# Patient Record
Sex: FEMALE | Race: WHITE | Employment: FULL TIME | URBAN - METROPOLITAN AREA
[De-identification: names, ages, dates, MRNs, and addresses within clinical notes are randomized per-mention and may not be internally consistent; named-entity substitution may affect disease eponyms.]

---

## 2017-01-19 ENCOUNTER — OFFICE VISIT (OUTPATIENT)
Dept: FAMILY MEDICINE | Facility: CLINIC | Age: 37
End: 2017-01-19
Payer: COMMERCIAL

## 2017-01-19 VITALS
SYSTOLIC BLOOD PRESSURE: 118 MMHG | OXYGEN SATURATION: 100 % | WEIGHT: 173 LBS | BODY MASS INDEX: 27.8 KG/M2 | HEIGHT: 66 IN | TEMPERATURE: 98.7 F | HEART RATE: 76 BPM | DIASTOLIC BLOOD PRESSURE: 64 MMHG

## 2017-01-19 DIAGNOSIS — F33.0 MILD RECURRENT MAJOR DEPRESSION (H): ICD-10-CM

## 2017-01-19 DIAGNOSIS — M20.41 HAMMER TOES OF BOTH FEET: ICD-10-CM

## 2017-01-19 DIAGNOSIS — Z23 NEED FOR PROPHYLACTIC VACCINATION AND INOCULATION AGAINST INFLUENZA: ICD-10-CM

## 2017-01-19 DIAGNOSIS — M20.42 HAMMER TOES OF BOTH FEET: ICD-10-CM

## 2017-01-19 DIAGNOSIS — R10.13 ABDOMINAL PAIN, EPIGASTRIC: Primary | ICD-10-CM

## 2017-01-19 LAB
ALBUMIN SERPL-MCNC: 3.9 G/DL (ref 3.4–5)
ALP SERPL-CCNC: 46 U/L (ref 40–150)
ALT SERPL W P-5'-P-CCNC: 19 U/L (ref 0–50)
AMYLASE SERPL-CCNC: 24 U/L (ref 30–110)
AST SERPL W P-5'-P-CCNC: 14 U/L (ref 0–45)
BASOPHILS # BLD AUTO: 0 10E9/L (ref 0–0.2)
BASOPHILS NFR BLD AUTO: 0.2 %
BILIRUB DIRECT SERPL-MCNC: 0.1 MG/DL (ref 0–0.2)
BILIRUB SERPL-MCNC: 0.5 MG/DL (ref 0.2–1.3)
DIFFERENTIAL METHOD BLD: NORMAL
EOSINOPHIL # BLD AUTO: 0.2 10E9/L (ref 0–0.7)
EOSINOPHIL NFR BLD AUTO: 1.8 %
ERYTHROCYTE [DISTWIDTH] IN BLOOD BY AUTOMATED COUNT: 12.4 % (ref 10–15)
HCT VFR BLD AUTO: 38 % (ref 35–47)
HGB BLD-MCNC: 12.6 G/DL (ref 11.7–15.7)
LIPASE SERPL-CCNC: 86 U/L (ref 73–393)
LYMPHOCYTES # BLD AUTO: 2.6 10E9/L (ref 0.8–5.3)
LYMPHOCYTES NFR BLD AUTO: 29.1 %
MCH RBC QN AUTO: 31.9 PG (ref 26.5–33)
MCHC RBC AUTO-ENTMCNC: 33.2 G/DL (ref 31.5–36.5)
MCV RBC AUTO: 96 FL (ref 78–100)
MONOCYTES # BLD AUTO: 0.7 10E9/L (ref 0–1.3)
MONOCYTES NFR BLD AUTO: 8.1 %
NEUTROPHILS # BLD AUTO: 5.5 10E9/L (ref 1.6–8.3)
NEUTROPHILS NFR BLD AUTO: 60.8 %
PLATELET # BLD AUTO: 253 10E9/L (ref 150–450)
PROT SERPL-MCNC: 7.2 G/DL (ref 6.8–8.8)
RBC # BLD AUTO: 3.95 10E12/L (ref 3.8–5.2)
WBC # BLD AUTO: 9 10E9/L (ref 4–11)

## 2017-01-19 PROCEDURE — 90686 IIV4 VACC NO PRSV 0.5 ML IM: CPT | Performed by: FAMILY MEDICINE

## 2017-01-19 PROCEDURE — 82150 ASSAY OF AMYLASE: CPT | Performed by: FAMILY MEDICINE

## 2017-01-19 PROCEDURE — 80076 HEPATIC FUNCTION PANEL: CPT | Performed by: FAMILY MEDICINE

## 2017-01-19 PROCEDURE — 36415 COLL VENOUS BLD VENIPUNCTURE: CPT | Performed by: FAMILY MEDICINE

## 2017-01-19 PROCEDURE — 99214 OFFICE O/P EST MOD 30 MIN: CPT | Mod: 25 | Performed by: FAMILY MEDICINE

## 2017-01-19 PROCEDURE — 90471 IMMUNIZATION ADMIN: CPT | Performed by: FAMILY MEDICINE

## 2017-01-19 PROCEDURE — 85025 COMPLETE CBC W/AUTO DIFF WBC: CPT | Performed by: FAMILY MEDICINE

## 2017-01-19 PROCEDURE — 83690 ASSAY OF LIPASE: CPT | Performed by: FAMILY MEDICINE

## 2017-01-19 ASSESSMENT — ANXIETY QUESTIONNAIRES
3. WORRYING TOO MUCH ABOUT DIFFERENT THINGS: NEARLY EVERY DAY
1. FEELING NERVOUS, ANXIOUS, OR ON EDGE: NEARLY EVERY DAY
2. NOT BEING ABLE TO STOP OR CONTROL WORRYING: NEARLY EVERY DAY
GAD7 TOTAL SCORE: 19
IF YOU CHECKED OFF ANY PROBLEMS ON THIS QUESTIONNAIRE, HOW DIFFICULT HAVE THESE PROBLEMS MADE IT FOR YOU TO DO YOUR WORK, TAKE CARE OF THINGS AT HOME, OR GET ALONG WITH OTHER PEOPLE: VERY DIFFICULT
6. BECOMING EASILY ANNOYED OR IRRITABLE: NEARLY EVERY DAY
5. BEING SO RESTLESS THAT IT IS HARD TO SIT STILL: NEARLY EVERY DAY
7. FEELING AFRAID AS IF SOMETHING AWFUL MIGHT HAPPEN: SEVERAL DAYS

## 2017-01-19 ASSESSMENT — PATIENT HEALTH QUESTIONNAIRE - PHQ9: 5. POOR APPETITE OR OVEREATING: NEARLY EVERY DAY

## 2017-01-19 NOTE — NURSING NOTE
"Chief Complaint   Patient presents with     Abdominal Pain     Depression     Musculoskeletal Problem     Right foot painful      Musculoskeletal Problem     left shin has a bruise for over a year     Flu Shot       Initial /64 mmHg  Pulse 76  Temp(Src) 98.7  F (37.1  C)  Ht 5' 6\" (1.676 m)  Wt 173 lb (78.472 kg)  BMI 27.94 kg/m2  SpO2 100% Estimated body mass index is 27.94 kg/(m^2) as calculated from the following:    Height as of this encounter: 5' 6\" (1.676 m).    Weight as of this encounter: 173 lb (78.472 kg).  BP completed using cuff size: kathryn Pinedo MA      "

## 2017-01-19 NOTE — PROGRESS NOTES
"Chief Complaint   Patient presents with     Abdominal Pain     Depression     Musculoskeletal Problem     Right foot painful      Musculoskeletal Problem     left shin has a bruise for over a year     Flu Shot     SUBJECTIVE:  This 36 year old female is here today for a variety of reasons:  1. She has had chronic upper abdomen pains for the past 15 years. Recalls that she was a vegetarian until she got pregnant with her son 15 years ago. She craved meat while pregnant. She has had recurrent upper abdomen pains after eating since delivery of her son. She has since had another pregnancy. She avoids all greasy foods. She is to the point where she feels she can't eat much of anything without having abdomen pain. It is often associated with vomiting too. In fact, she has lost 80 pounds over the past few years due to her avoids eating so many foods. She wonders if she needs a colonoscopy. She was referred for an abdomen ultrasound 2 years ago, but she didn't get that done. 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 She cooks her own fresh foods daily.   2. She has to stand all day in a kitchen and she has developed a painful area on the bottom of her right foot. It thickens and she cuts it down. She wears a donut cushion around it but those cushions don't stay in place. She needs to have less pain in her feet so she can keep working.  3. She had some trauma twice to her left lower tibia area in the past year. She is surprised that it is still discolored. It doesn't hurt anymore, though.   4. Her depression is under good control and she needs a refill of her prozac.   All other review of systems are negative  Personal, family, and social history reviewed with patient and revised.  OBJECTIVE:  Vital signs:  Temp: 98.7  F (37.1  C)   BP: 118/64 mmHg Pulse: 76     SpO2: 100 %     Height: 5' 6\" (167.6 cm) Weight: 173 lb (78.472 kg)  Estimated body mass index is 27.94 kg/(m^2) as calculated from " "the following:    Height as of this encounter: 5' 6\" (1.676 m).    Weight as of this encounter: 173 lb (78.472 kg).  Well hydrated  Well nourished  Well groomed  Active talker  Good spirits  Abdomen: soft and non-tender. No masses. No hepatosplenomegaly. She has not eaten yet today so her pain is not there.  Moves well on exam table   Patient has hammertoes. Has callus over the plantar aspect of right foot over 3rd and 4th toes. I pared it down for her. She will try moleskin over this area  She has mild ecchymosis over her left lower tibia from previous trauma. Reassured her. This will slowly resolve.   Brisk gait with no limp  No jaundice   ASSESSMENT / PLAN:  (R10.13) Abdominal pain, epigastric  (primary encounter diagnosis)  Comment: as above, most likely due to gallstones   Plan: US Abdomen Complete, Hepatic panel, CBC with         platelets differential, Amylase, Lipase         If ultrasound is negative, will do HIDA scan     (F33.0) Mild recurrent major depression (H)  Comment:   PHQ-9 score:    PHQ-9 SCORE 1/19/2017   Total Score -   Total Score 14     Plan: FLUoxetine (PROZAC) 20 MG capsule        Refilled     (M20.41,  M20.42) Hammer toes of both feet  Comment: as above   Plan: PODIATRY/FOOT & ANKLE SURGERY REFERRAL             (Z23) Need for prophylactic vaccination and inoculation against influenza  Comment: due  Plan: FLU VAC, SPLIT VIRUS IM > 3 YO (QUADRIVALENT)         [61138], Vaccine Administration, Initial         [41357]             FLAQUITO VERDUGO M.D.                          Injectable Influenza Immunization Documentation    1.  Is the person to be vaccinated sick today?  No    2. Does the person to be vaccinated have an allergy to eggs or to a component of the vaccine?  No    3. Has the person to be vaccinated today ever had a serious reaction to influenza vaccine in the past?  No    4. Has the person to be vaccinated ever had Guillain-Jourdanton syndrome?  No     Form completed by Kathryn " Khris CASE

## 2017-01-19 NOTE — Clinical Note
[address] [place] [address]    January 20, 2017    Jena Pinedo  [address] [place] [address]          Dear Jena,    Your blood work all looks good. The ultrasound will give more information    Enclosed is a copy of your results.     Results for orders placed or performed in visit on 01/19/17   Hepatic panel   Result Value Ref Range    Bilirubin Direct 0.1 0.0 - 0.2 mg/dL    Bilirubin Total 0.5 0.2 - 1.3 mg/dL    Albumin 3.9 3.4 - 5.0 g/dL    Protein Total 7.2 6.8 - 8.8 g/dL    Alkaline Phosphatase 46 40 - 150 U/L    ALT 19 0 - 50 U/L    AST 14 0 - 45 U/L   CBC with platelets differential   Result Value Ref Range    WBC 9.0 4.0 - 11.0 10e9/L    RBC Count 3.95 3.8 - 5.2 10e12/L    Hemoglobin 12.6 11.7 - 15.7 g/dL    Hematocrit 38.0 35.0 - 47.0 %    MCV 96 78 - 100 fl    MCH 31.9 26.5 - 33.0 pg    MCHC 33.2 31.5 - 36.5 g/dL    RDW 12.4 10.0 - 15.0 %    Platelet Count 253 150 - 450 10e9/L    Diff Method Automated Method     % Neutrophils 60.8 %    % Lymphocytes 29.1 %    % Monocytes 8.1 %    % Eosinophils 1.8 %    % Basophils 0.2 %    Absolute Neutrophil 5.5 1.6 - 8.3 10e9/L    Absolute Lymphocytes 2.6 0.8 - 5.3 10e9/L    Absolute Monocytes 0.7 0.0 - 1.3 10e9/L    Absolute Eosinophils 0.2 0.0 - 0.7 10e9/L    Absolute Basophils 0.0 0.0 - 0.2 10e9/L   Amylase   Result Value Ref Range    Amylase 24 (L) 30 - 110 U/L   Lipase   Result Value Ref Range    Lipase 86 73 - 393 U/L       If you have any questions or concerns, please call myself or my nurse at [phone].      Sincerely,        Krysten Duncan MD/ELLEN

## 2017-01-19 NOTE — PATIENT INSTRUCTIONS
HOW TO QUIT SMOKING  Smoking is one of the hardest habits to break. About half of all those who have ever smoked have been able to quit, and most of those (about 70%) who still smoke want to quit. Here are some of the best ways to stop smoking.     KEEP TRYING:  It takes most smokers about 8 tries before they are finally able to fully quit. So, the more often you try and fail, the better your chance of quitting the next time! So, don't give up!    GO COLD TURKEY:  Most ex-smokers quit cold turkey. Trying to cut back gradually doesn't seem to work as well, perhaps because it continues the smoking habit. Also, it is possible to fool yourself by inhaling more while smoking fewer cigarettes. This results in the same amount of nicotine in your body!    GET SUPPORT:  Support programs can make an important difference, especially for the heavy smoker. These groups offer lectures, methods to change your behavior and peer support. Call the free national Quitline for more information. 800-QUIT-NOW (305-389-8538). Low-cost or free programs are offered by many hospitals, local chapters of the American Lung Association (404-594-7886) and the American Cancer Society (057-710-1319). Support at home is important too. Non-smokers can help by offering praise and encouragement. If the smoker fails to quit, encourage them to try again!    OVER-THE-COUNTER MEDICINES:  For those who can't quit on their own, Nicotine Replacement Therapy (NRT) may make quitting much easier. Certain aids such as the nicotine patch, gum and lozenge are available without a prescription. However, it is best to use these under the guidance of your doctor. The skin patch provides a steady supply of nicotine to the body. Nicotine gum and lozenge gives temporary bursts of low levels of nicotine. Both methods take the edge off the craving for cigarettes. WARNING: If you feel symptoms of nicotine overdose, such as nausea, vomiting, dizziness, weakness, or fast  heartbeat, stop using these and see your doctor.    PRESCRIPTION MEDICINES:  After evaluating your smoking patterns and prior attempts at quitting, your doctor may offer a prescription medicine such as bupropion (Zyban, Wellbutrin), varenicline (Chantix, Champix), a niocotine inhaler or nasal spray. Each has its unique advantage and side effects which your doctor can review with you.    HEALTH BENEFITS OF QUITTING:  The benefits of quitting start right away and keep improving the longer you go without smokin minutes: blood pressure and pulse return to normal  8 hours: oxygen levels return to normal  2 days: ability to smell and taste begins to improve as damaged nerves start to regrow  2-3 weeks: circulation and lung function improves  1-9 months: decreased cough, congestion and shortness of breath; less tired  1 year: risk of heart attack decreases by half  5 years: risk of lung cancer decreases by half; risk of stroke becomes the same as a non-smoker  For information about how to quit smoking, visit the following links:  [employer] [place] ,   Clearing the Air, Quit Smoking Today   - an online booklet. http://www.smokefree.gov/pubs/clearing_the_air.pdf  Smokefree.gov http://smokefree.gov/  QuitNet http://www.quitnet.com/    3610-6942 Ketan Cowan, [address] [place] [address], [place] [address]. All rights reserved. This information is not intended as a substitute for professional medical care. Always follow your healthcare professional's instructions.    The Benefits of Living Smoke Free  What do you want to gain from quitting? Check off some reasons to quit.  Health Benefits  ___ Reduce my risk of lung cancer, heart disease, chronic lung disease  ___ Have fewer wrinkles and softer skin  ___ Improve my sense of taste and smell  ___ For pregnant women--reduce the risk of having a miscarriage, stillbirth, premature birth, or low-birth-weight baby  Personal Benefits  ___ Feel more in control of my  life  ___ Have better-smelling hair, breath, clothes, home, and car  ___ Save time by not having to take smoke breaks, buy cigarettes, or hunt for a light  ___ Have whiter teeth  Family Benefits  ___ Reduce my children s respiratory tract infections  ___ Set a good example for my children  ___ Reduce my family s cancer risk  Financial Benefits  ___ Save hundreds of dollars each year that would be spent on cigarettes  ___ Save money on medical bills  ___ Save on life, health, and car insurance premiums    Those Dollars Add Up!  Cigarettes are expensive, and getting more expensive all the time. Do you realize how much money you are spending on cigarettes per year? What is the average amount you spend on a pack of cigarettes? What is the average number of packs that you smoke per day? Using your answers to these questions, fill in this formula to help you find out:  ($ _____ per pack) ×  ( _____ number of packs per day) × (365 days) =  $ _____ yearly cost of smoking  Besides tobacco, there are other costs, including extra cleaning bills and replacement costs for clothing and furniture; medical expenses for smoking-related illnesses; and higher health, life, and car insurance premiums.    Cigars and Pipes Count Too!  Cigars and pipes are also dangerous. So are smokeless (chewing) tobacco and snuff. All of these products contain nicotine, a highly addictive substance that has harmful effects on your body. Quitting smoking means giving up all tobacco products.      6804-7712 [address], [place] [address]. All rights reserved. This information is not intended as a substitute for professional medical care. Always follow your healthcare professional's instructions.    [facility]    If you have any questions regarding to your visit please contact your care team:       Team Purple:   Clinic Hours Telephone Number   Dr. Krysten Reyes,  [place]   7am-7pm  Monday - Thursday   7am-5pm  Fridays  [phone]  (Appointment scheduling available 24/7)    Questions about your Visit?   Team Line:  [phone]   Urgent Care - [place] and [place] - 11am-9pm Monday-Friday Saturday-Sunday- 9am-5pm   [place] - 5pm-9pm Monday-Friday Saturday-Sunday- 9am-5pm  [phone] - [place]   [phone] - [place]       What options do I have for visits at the clinic other than the traditional office visit?  To expand how we care for you, many of our providers are utilizing electronic visits (e-visits) and telephone visits, when medically appropriate, for interactions with their patients rather than a visit in the clinic.   We also offer nurse visits for many medical concerns. Just like any other service, we will bill your insurance company for this type of visit based on time spent on the phone with your provider. Not all insurance companies cover these visits. Please check with your medical insurance if this type of visit is covered. You will be responsible for any charges that are not paid by your insurance.      E-visits via [employer]:  generally incur a $35.00 fee.  Telephone visits:  Time spent on the phone: *charged based on time that is spent on the phone in increments of 10 minutes. Estimated cost:   5-10 mins $30.00   11-20 mins. $59.00   21-30 mins. $85.00     Use [employer] (secure email communication and access to your chart) to send your primary care provider a message or make an appointment. Ask someone on your Team how to sign up for [employer].  For a Price Quote for your services, please call our Consumer Price Line at [phone].  As always, Thank you for trusting us with your health care needs!

## 2017-01-19 NOTE — MR AVS SNAPSHOT
After Visit Summary   1/19/2017    Jena Pinedo    MRN: [id number]           Patient Information     Date Of Birth          1980        Visit Information        Provider Department      1/19/2017 10:30 AM Krysten Duncan MD [facility]        Today's Diagnoses     Abdominal pain, epigastric    -  1     Mild recurrent major depression (H)         Hammer toes of both feet         Need for prophylactic vaccination and inoculation against influenza           Care Instructions      HOW TO QUIT SMOKING  Smoking is one of the hardest habits to break. About half of all those who have ever smoked have been able to quit, and most of those (about 70%) who still smoke want to quit. Here are some of the best ways to stop smoking.     KEEP TRYING:  It takes most smokers about 8 tries before they are finally able to fully quit. So, the more often you try and fail, the better your chance of quitting the next time! So, don't give up!    GO COLD TURKEY:  Most ex-smokers quit cold turkey. Trying to cut back gradually doesn't seem to work as well, perhaps because it continues the smoking habit. Also, it is possible to fool yourself by inhaling more while smoking fewer cigarettes. This results in the same amount of nicotine in your body!    GET SUPPORT:  Support programs can make an important difference, especially for the heavy smoker. These groups offer lectures, methods to change your behavior and peer support. Call the free national Quitline for more information. 800-QUIT-NOW (563-888-9743). Low-cost or free programs are offered by many hospitals, local chapters of the American Lung Association (492-547-6508) and the American Cancer Society (565-559-7138). Support at home is important too. Non-smokers can help by offering praise and encouragement. If the smoker fails to quit, encourage them to try again!    OVER-THE-COUNTER MEDICINES:  For those who can't quit on their own, Nicotine  Replacement Therapy (NRT) may make quitting much easier. Certain aids such as the nicotine patch, gum and lozenge are available without a prescription. However, it is best to use these under the guidance of your doctor. The skin patch provides a steady supply of nicotine to the body. Nicotine gum and lozenge gives temporary bursts of low levels of nicotine. Both methods take the edge off the craving for cigarettes. WARNING: If you feel symptoms of nicotine overdose, such as nausea, vomiting, dizziness, weakness, or fast heartbeat, stop using these and see your doctor.    PRESCRIPTION MEDICINES:  After evaluating your smoking patterns and prior attempts at quitting, your doctor may offer a prescription medicine such as bupropion (Zyban, Wellbutrin), varenicline (Chantix, Champix), a niocotine inhaler or nasal spray. Each has its unique advantage and side effects which your doctor can review with you.    HEALTH BENEFITS OF QUITTING:  The benefits of quitting start right away and keep improving the longer you go without smokin minutes: blood pressure and pulse return to normal  8 hours: oxygen levels return to normal  2 days: ability to smell and taste begins to improve as damaged nerves start to regrow  2-3 weeks: circulation and lung function improves  1-9 months: decreased cough, congestion and shortness of breath; less tired  1 year: risk of heart attack decreases by half  5 years: risk of lung cancer decreases by half; risk of stroke becomes the same as a non-smoker  For information about how to quit smoking, visit the following links:  National Cancer [place] ,   Clearing the Air, Quit Smoking Today   - an online booklet. http://www.smokefree.gov/pubs/clearing_the_air.pdf  Smokefree.gov http://smokefree.gov/  QuitNet http://www.quitnet.com/    3528-6473 Ketan Cowan, [address] [place] [address], [place] [address]. All rights reserved. This information is not intended as a substitute for professional medical  care. Always follow your healthcare professional's instructions.    The Benefits of Living Smoke Free  What do you want to gain from quitting? Check off some reasons to quit.  Health Benefits  ___ Reduce my risk of lung cancer, heart disease, chronic lung disease  ___ Have fewer wrinkles and softer skin  ___ Improve my sense of taste and smell  ___ For pregnant women--reduce the risk of having a miscarriage, stillbirth, premature birth, or low-birth-weight baby  Personal Benefits  ___ Feel more in control of my life  ___ Have better-smelling hair, breath, clothes, home, and car  ___ Save time by not having to take smoke breaks, buy cigarettes, or hunt for a light  ___ Have whiter teeth  Family Benefits  ___ Reduce my children s respiratory tract infections  ___ Set a good example for my children  ___ Reduce my family s cancer risk  Financial Benefits  ___ Save hundreds of dollars each year that would be spent on cigarettes  ___ Save money on medical bills  ___ Save on life, health, and car insurance premiums    Those Dollars Add Up!  Cigarettes are expensive, and getting more expensive all the time. Do you realize how much money you are spending on cigarettes per year? What is the average amount you spend on a pack of cigarettes? What is the average number of packs that you smoke per day? Using your answers to these questions, fill in this formula to help you find out:  ($ _____ per pack) ×  ( _____ number of packs per day) × (365 days) =  $ _____ yearly cost of smoking  Besides tobacco, there are other costs, including extra cleaning bills and replacement costs for clothing and furniture; medical expenses for smoking-related illnesses; and higher health, life, and car insurance premiums.    Cigars and Pipes Count Too!  Cigars and pipes are also dangerous. So are smokeless (chewing) tobacco and snuff. All of these products contain nicotine, a highly addictive substance that has harmful effects on your body. Quitting  smoking means giving up all tobacco products.      5359-7951 Ketan Cowan, [address] [facility], [place] [address]. All rights reserved. This information is not intended as a substitute for professional medical care. Always follow your healthcare professional's instructions.    [facility]    If you have any questions regarding to your visit please contact your care team:       Team Purple:   Clinic Hours Telephone Number   [address] Dr.   7am-7pm  Monday - Thursday   7am-5pm  Fridays  [phone]  (Appointment scheduling available 24/7)    Questions about your Visit?   Team Line:  [phone]   Urgent Care - [place] and [place] - 11am-9pm Monday-Friday Saturday-Sunday- 9am-5pm   [place] - 5pm-9pm Monday-Friday Saturday-Sunday- 9am-5pm  [phone] - [place]   [phone] - [place]       What options do I have for visits at the clinic other than the traditional office visit?  To expand how we care for you, many of our providers are utilizing electronic visits (e-visits) and telephone visits, when medically appropriate, for interactions with their patients rather than a visit in the clinic.   We also offer nurse visits for many medical concerns. Just like any other service, we will bill your insurance company for this type of visit based on time spent on the phone with your provider. Not all insurance companies cover these visits. Please check with your medical insurance if this type of visit is covered. You will be responsible for any charges that are not paid by your insurance.      E-visits via FAGUO:  generally incur a $35.00 fee.  Telephone visits:  Time spent on the phone: *charged based on time that is spent on the phone in increments of 10 minutes. Estimated cost:   5-10 mins $30.00   11-20 mins. $59.00   21-30 mins. $85.00     Use FAGUO (secure email communication and access to your chart) to  send your primary care provider a message or make an appointment. Ask someone on your Team how to sign up for [place].  For a Price Quote for your services, please call our Consumer Price Line at [phone].  As always, Thank you for trusting us with your health care needs!            Follow-ups after your visit        Additional Services     PODIATRY/FOOT & ANKLE SURGERY REFERRAL       Your provider has referred you to: [facility]: [facility] [place] [phone]   [email or web address]    Please be aware that coverage of these services is subject to the terms and limitations of your health insurance plan.  Call member services at your health plan with any benefit or coverage questions.      Please bring the following to your appointment:  >>   Any x-rays, CTs or MRIs which have been performed.  Contact the facility where they were done to arrange for  prior to your scheduled appointment.  Any new CT, MRI or other procedures ordered by your specialist must be performed at a [facility] facility or coordinated by your clinic's referral office.    >>   List of current medications   >>   This referral request   >>   Any documents/labs given to you for this referral                  Future tests that were ordered for you today     Open Future Orders        Priority Expected Expires Ordered    US Abdomen Complete Routine  1/19/2018 1/19/2017            Who to contact     If you have questions or need follow up information about today's clinic visit or your schedule please contact [facility] directly at [phone].  Normal or non-critical lab and imaging results will be communicated to you by MyChart, letter or phone within 4 business days after the clinic has received the results. If you do not hear from us within 7 days, please contact the clinic through MyChart or phone. If you have a critical or abnormal lab result, we will notify you by phone as soon as  "possible.  Submit refill requests through [employer] or call your pharmacy and they will forward the refill request to us. Please allow 3 business days for your refill to be completed.          Additional Information About Your Visit        [employer] Information     [employer] gives you secure access to your electronic health record. If you see a primary care provider, you can also send messages to your care team and make appointments. If you have questions, please call your primary care clinic.  If you do not have a primary care provider, please call [phone] and they will assist you.        Care EveryWhere ID     This is your Care EveryWhere ID. This could be used by other organizations to access your [place] medical records  [id number]        Your Vitals Were     Pulse Temperature Height BMI (Body Mass Index) Pulse Oximetry       76 98.7  F (37.1  C) 5' 6\" (1.676 m) 27.94 kg/m2 100%        Blood Pressure from Last 3 Encounters:   01/19/17 118/64   05/19/16 106/68   01/27/16 118/70    Weight from Last 3 Encounters:   01/19/17 173 lb (78.472 kg)   05/19/16 173 lb (78.472 kg)   01/27/16 169 lb 6.4 oz (76.839 kg)              We Performed the Following     Amylase     CBC with platelets differential     FLU VAC, SPLIT VIRUS IM > 3 YO (QUADRIVALENT) [16280]     Hepatic panel     Lipase     PODIATRY/FOOT & ANKLE SURGERY REFERRAL     Vaccine Administration, Initial [53285]          Today's Medication Changes          These changes are accurate as of: 1/19/17 11:04 AM.  If you have any questions, ask your nurse or doctor.               These medicines have changed or have updated prescriptions.        Dose/Directions    FLUoxetine 20 MG capsule   Commonly known as:  PROzac   This may have changed:  additional instructions   Used for:  Mild recurrent major depression (H)   Changed by:  Krysten Duncan MD        Dose:  20 mg   Take 1 capsule (20 mg) by mouth daily   Quantity:  90 capsule   Refills:  4            Where " to get your medicines      These medications were sent to [id number] - [place], [place] - [address] AT [facility] [address]  [address], [place] [address]     Phone:  [phone]    - FLUoxetine 20 MG capsule             Primary Care Provider Office Phone # Fax #    Yanira Perez [phone]       [address] [address]        Thank you!     Thank you for choosing [facility]  for your care. Our goal is always to provide you with excellent care. Hearing back from our patients is one way we can continue to improve our services. Please take a few minutes to complete the written survey that you may receive in the mail after your visit with us. Thank you!             Your Updated Medication List - Protect others around you: Learn how to safely use, store and throw away your medicines at www.disposemymeds.org.          This list is accurate as of: 1/19/17 11:04 AM.  Always use your most recent med list.                   Brand Name Dispense Instructions for use    amitriptyline 50 MG tablet    ELAVIL    130 tablet    Take one or two at bedtime       FLUoxetine 20 MG capsule    PROzac    90 capsule    Take 1 capsule (20 mg) by mouth daily       MIRENA (52 MG) 20 MCG/24HR IUD   Generic drug:  levonorgestrel      1 each by Intrauterine route once.

## 2017-01-20 ASSESSMENT — PATIENT HEALTH QUESTIONNAIRE - PHQ9: SUM OF ALL RESPONSES TO PHQ QUESTIONS 1-9: 14

## 2017-01-20 ASSESSMENT — ANXIETY QUESTIONNAIRES: GAD7 TOTAL SCORE: 19

## 2017-02-03 ENCOUNTER — RADIANT APPOINTMENT (OUTPATIENT)
Dept: ULTRASOUND IMAGING | Facility: CLINIC | Age: 37
End: 2017-02-03
Attending: FAMILY MEDICINE
Payer: COMMERCIAL

## 2017-02-03 DIAGNOSIS — R10.13 ABDOMINAL PAIN, EPIGASTRIC: ICD-10-CM

## 2017-02-03 PROCEDURE — 76700 US EXAM ABDOM COMPLETE: CPT

## 2017-02-03 NOTE — Clinical Note
[facility]  [address] [place] [address]    February 3, 2017    Jena Pinedo  [address] [place] [address]          Dear Jena,    Your ultrasound is normal. I have placed an order for you to move on to another test called a HIDA scan. That [facility] done [facility] . Please call [phone] to schedule that test. It is a good test to see how your gallbladder in functioning.    Enclosed is a copy of your results.     Results for orders placed or performed in visit on 02/03/17   US Abdomen Complete    Narrative    ULTRASOUND  ABDOMEN COMPLETE  2/3/2017 8:32 AM      HISTORY: Epigastric pain.     COMPARISON: None.    FINDINGS: The liver is normal in size and texture. There is a small  hyperechoic lesion in the right lobe of the liver laterally, measuring  approximately 1.0 x 0.8 x 1.0 cm. There is no intra or extrahepatic  biliary dilatation. The common hepatic duct measures 0.6 cm. The  gallbladder is normal appearance without gallstones. The pancreas head  and body appear normal. The tail is obscured by bowel gas. The spleen  is normal size and appearance measuring 11.3 cm. The right kidney  measures 10.6 cm and the left kidney measures 10.2 cm. The kidneys are  normal in appearance. The proximal abdominal aorta and IVC appear  normal.      Impression    IMPRESSION:    1. No gallstone or biliary dilatation.  2. A 1 cm hyperechoic liver lesion may be a hemangioma.    RG TELLO MD       If you have any questions or concerns, please call myself or my nurse at [phone].      Sincerely,        Krysten Duncan MD /ELLEN

## 2017-02-03 NOTE — PROGRESS NOTES
Results for orders placed or performed in visit on 02/03/17   US Abdomen Complete    Narrative    ULTRASOUND  ABDOMEN COMPLETE  2/3/2017 8:32 AM      HISTORY: Epigastric pain.     COMPARISON: None.    FINDINGS: The liver is normal in size and texture. There is a small  hyperechoic lesion in the right lobe of the liver laterally, measuring  approximately 1.0 x 0.8 x 1.0 cm. There is no intra or extrahepatic  biliary dilatation. The common hepatic duct measures 0.6 cm. The  gallbladder is normal appearance without gallstones. The pancreas head  and body appear normal. The tail is obscured by bowel gas. The spleen  is normal size and appearance measuring 11.3 cm. The right kidney  measures 10.6 cm and the left kidney measures 10.2 cm. The kidneys are  normal in appearance. The proximal abdominal aorta and IVC appear  normal.      Impression    IMPRESSION:    1. No gallstone or biliary dilatation.  2. A 1 cm hyperechoic liver lesion may be a hemangioma.    RG TELLO MD    patient will be referred to [facility]  for HIDA scan to check for non-functioning gallbladder.     FLAQUITO VERDUGO M.D.

## 2017-03-09 ENCOUNTER — TRANSFERRED RECORDS (OUTPATIENT)
Dept: HEALTH INFORMATION MANAGEMENT | Facility: CLINIC | Age: 37
End: 2017-03-09

## 2017-03-14 ENCOUNTER — OFFICE VISIT (OUTPATIENT)
Dept: FAMILY MEDICINE | Facility: CLINIC | Age: 37
End: 2017-03-14
Payer: COMMERCIAL

## 2017-03-14 VITALS
WEIGHT: 176.5 LBS | HEART RATE: 95 BPM | BODY MASS INDEX: 28.37 KG/M2 | TEMPERATURE: 97.3 F | HEIGHT: 66 IN | SYSTOLIC BLOOD PRESSURE: 100 MMHG | DIASTOLIC BLOOD PRESSURE: 60 MMHG | OXYGEN SATURATION: 100 %

## 2017-03-14 DIAGNOSIS — R10.2 PELVIC PAIN IN FEMALE: ICD-10-CM

## 2017-03-14 DIAGNOSIS — R30.0 BURNING WITH URINATION: Primary | ICD-10-CM

## 2017-03-14 LAB
ALBUMIN UR-MCNC: NEGATIVE MG/DL
APPEARANCE UR: CLEAR
BILIRUB UR QL STRIP: NEGATIVE
COLOR UR AUTO: YELLOW
GLUCOSE UR STRIP-MCNC: NEGATIVE MG/DL
HGB UR QL STRIP: NEGATIVE
KETONES UR STRIP-MCNC: NEGATIVE MG/DL
LEUKOCYTE ESTERASE UR QL STRIP: NEGATIVE
NITRATE UR QL: NEGATIVE
PH UR STRIP: 7 PH (ref 5–7)
SP GR UR STRIP: 1.01 (ref 1–1.03)
URN SPEC COLLECT METH UR: NORMAL
UROBILINOGEN UR STRIP-ACNC: 0.2 EU/DL (ref 0.2–1)

## 2017-03-14 PROCEDURE — 99214 OFFICE O/P EST MOD 30 MIN: CPT | Performed by: PHYSICIAN ASSISTANT

## 2017-03-14 PROCEDURE — 81003 URINALYSIS AUTO W/O SCOPE: CPT | Performed by: PHYSICIAN ASSISTANT

## 2017-03-14 RX ORDER — OXYCODONE AND ACETAMINOPHEN 5; 325 MG/1; MG/1
1-2 TABLET ORAL EVERY 4 HOURS PRN
COMMUNITY
Start: 2017-03-11 | End: 2017-03-21

## 2017-03-14 RX ORDER — CYCLOBENZAPRINE HCL 10 MG
10 TABLET ORAL 3 TIMES DAILY PRN
COMMUNITY
Start: 2017-03-11 | End: 2017-03-21

## 2017-03-14 RX ORDER — OXYCODONE AND ACETAMINOPHEN 5; 325 MG/1; MG/1
1-2 TABLET ORAL EVERY 6 HOURS PRN
Qty: 24 TABLET | Refills: 0 | Status: SHIPPED | OUTPATIENT
Start: 2017-03-14 | End: 2017-03-31

## 2017-03-14 NOTE — PROGRESS NOTES
SUBJECTIVE:                                                    Jena Pinedo is a 36 year old female who presents to clinic today for the following health issues:      Hospital Follow-up Visit:    Hospital/Nursing Home/IP Rehab Facility: [facility]  Date of Admission: 03/09/2017  Date of Discharge: 03/11/2017  Reason(s) for Admission: Pelvic Pain            Problems taking medications regularly:  None       Medication changes since discharge: None       Problems adhering to non-medication therapy:  None    Summary of hospitalization:  [facility] discharge summary reviewed  Diagnostic Tests/Treatments reviewed.  Follow up needed: none  Other Healthcare Providers Involved in Patient s Care:         None  Update since discharge: Patient was improving until yesterday when she was moving around going up and down the stairs and this morning she had increased pain on the left side of her labia.      Post Discharge Medication Reconciliation: discharge medications reconciled, continue medications without change.  Plan of care communicated with patient     Coding guidelines for this visit:  Type of Medical   Decision Making Face-to-Face Visit       within 7 Days of discharge Face-to-Face Visit        within 14 days of discharge   Moderate Complexity [id number]   High Complexity [id number]            Problem list and histories reviewed & adjusted, as indicated.  Additional history: as documented    Patient Active Problem List   Diagnosis     CARDIOVASCULAR SCREENING; LDL GOAL LESS THAN 160     Tobacco abuse     Allergic rhinitis     Chronic neck pain     Migraines     Recurrent low back pain     Mild recurrent major depression (H)     Past Surgical History   Procedure Laterality Date     Open reduction internal fixation ankle  1/2006     LT       Social History   Substance Use Topics     Smoking status: Current Some Day Smoker     Packs/day: 0.10     Years: 13.00     Types: Cigarettes     Smokeless  tobacco: Never Used      Comment: 1 pack per week      Alcohol use Yes     Family History   Problem Relation Age of Onset     Hypertension Mother      Thyroid Disease Mother      Neurologic Disorder Mother      migraine     Hypertension Maternal Grandmother      C.A.D. Maternal Grandfather      MI     CANCER Paternal Grandmother      bone     Asthma Sister      DIABETES No family hx of          Current Outpatient Prescriptions   Medication Sig Dispense Refill     oxyCODONE-acetaminophen (PERCOCET) 5-325 MG per tablet Take 1-2 tablets by mouth every 4 hours as needed       cyclobenzaprine (FLEXERIL) 10 MG tablet Take 10 mg by mouth 3 times daily as needed       oxyCODONE-acetaminophen (PERCOCET) 5-325 MG per tablet Take 1-2 tablets by mouth every 6 hours as needed for pain maximum 6 tablet(s) per day 24 tablet 0     FLUoxetine (PROZAC) 20 MG capsule Take 1 capsule (20 mg) by mouth daily 90 capsule 4     amitriptyline (ELAVIL) 50 MG tablet Take one or two at bedtime 130 tablet 4     levonorgestrel (MIRENA) 20 MCG/24HR IUD 1 each by Intrauterine route once.       Allergies   Allergen Reactions     Nicotine      Patch, arm swelling, itchy, red       Reviewed and updated as needed this visit by clinical staff  Tobacco  Allergies  Meds  Med Hx  Surg Hx  Fam Hx  Soc Hx      Reviewed and updated as needed this visit by Provider      ROS:  C: NEGATIVE for fever, chills, change in weight  INTEGUMENTARY/SKIN: NEGATIVE for worrisome rashes, moles or lesions  E/M: NEGATIVE for sore throat, ear or sinus problems  R: NEGATIVE for cough  CV: NEGATIVE for chest pain, palpitations or peripheral edema  GI: NEGATIVE for nausea, abdominal pain, heartburn, or change in bowel habits  : POSITIVE for dysuria and pain on the left side of her labia.  MUSCULOSKELETAL: POSITIVE for pain on left side of labia  NEURO: NEGATIVE for weakness, dizziness or paresthesias  ENDOCRINE: NEGATIVE for cold intolerance, HX diabetes and HX thyroid  "disease  HEME/ALLERGY/IMMUNE: NEGATIVE for swollen nodes      OBJECTIVE:                                                    /60  Pulse 95  Temp 97.3  F (36.3  C) (Oral)  Ht 5' 6\" (1.676 m)  Wt 176 lb 8 oz (80.1 kg)  SpO2 100%  BMI 28.49 kg/m2  Body mass index is 28.49 kg/(m^2).  GENERAL: healthy, alert and no distress  RESP: lungs clear to auscultation - no rales, rhonchi or wheezes  CV: regular rate and rhythm, normal S1 S2, no S3 or S4, no murmur, click or rub, no peripheral edema and peripheral pulses strong  ABDOMEN: soft, nontender, no hepatosplenomegaly, no masses and bowel sounds normal   (female): normal female external genitalia, normal urethral meatus, vaginal mucosa, normal cervix/adnexa/uterus without masses or discharge  MS: no gross musculoskeletal defects noted, no edema    Diagnostic Test Results:  Results for orders placed or performed in visit on 03/14/17 (from the past 48 hour(s))   UA reflex to Microscopic and Culture   Result Value Ref Range    Color Urine Yellow     Appearance Urine Clear     Glucose Urine Negative NEG mg/dL    Bilirubin Urine Negative NEG    Ketones Urine Negative NEG mg/dL    Specific Gravity Urine 1.015 1.003 - 1.035    Blood Urine Negative NEG    pH Urine 7.0 5.0 - 7.0 pH    Protein Albumin Urine Negative NEG mg/dL    Urobilinogen Urine 0.2 0.2 - 1.0 EU/dL    Nitrite Urine Negative NEG    Leukocyte Esterase Urine Negative NEG    Source Midstream Urine           ASSESSMENT/PLAN:                                                    1. Burning with urination  NEGATIVE  - UA reflex to Microscopic and Culture    2. Pelvic pain in female  See OB this week for continued pain as they may have more input for treatment. Offered PT referral, which patient states that she is interested in if pain continues.   - cyclobenzaprine (FLEXERIL) 10 MG tablet; Take 10 mg by mouth 3 times daily as needed  - oxyCODONE-acetaminophen (PERCOCET) 5-325 MG per tablet; Take 1-2 tablets by mouth " every 6 hours as needed for pain maximum 6 tablet(s) per day  Dispense: 24 tablet; Refill: 0    I have discussed any lab or imaging results, the patient's diagnosis, and my plan of treatment with the patient and/or family. Patient is aware to come back in with worsening symptoms or if no relief despite treatment plan.  Patient voiced understanding and had no further questions.     Kelsy Solares PA-C  [facility]

## 2017-03-14 NOTE — LETTER
[address] [address]  Phone: [phone]    March 14, 2017        Jena Pinedo  [address] [place] [address]          To whom it may concern:    RE: Jena Pinedo    Patient was seen and treated today at our clinic. Please excuse her from work from 03/14/2017-3/17/2017    Please contact me for questions or concerns.      Sincerely,        Kelsy Solares PA-C

## 2017-03-14 NOTE — NURSING NOTE
"Chief Complaint   Patient presents with     Hospital F/U     [facility]-  03/9/2017-3/11/2017- Pelvic Pain     UTI     burning sensation with urination x this morning        Initial /60  Pulse 95  Temp 97.3  F (36.3  C) (Oral)  Ht 5' 6\" (1.676 m)  Wt 176 lb 8 oz (80.1 kg)  SpO2 100%  BMI 28.49 kg/m2 Estimated body mass index is 28.49 kg/(m^2) as calculated from the following:    Height as of this encounter: 5' 6\" (1.676 m).    Weight as of this encounter: 176 lb 8 oz (80.1 kg).  Medication Reconciliation: complete     An MANPREET Johns    "

## 2017-03-14 NOTE — MR AVS SNAPSHOT
"              After Visit Summary   3/14/2017    Jena Pinedo    MRN: [id number]           Patient Information     Date Of Birth          1980        Visit Information        Provider Department      3/14/2017 3:00 PM Kelsy Solares PA-C [facility] ([facility]3] [facility]        Today's Diagnoses     Burning with urination    -  1    Pelvic pain in female           Follow-ups after your visit        Who to contact     If you have questions or need follow up information about today's clinic visit or your schedule please contact [facility] directly at [phone].  Normal or non-critical lab and imaging results will be communicated to you by "Carmolex,"hart, letter or phone within 4 business days after the clinic has received the results. If you do not hear from us within 7 days, please contact the clinic through [employer] or phone. If you have a critical or abnormal lab result, we will notify you by phone as soon as possible.  Submit refill requests through [email or web address] or call your pharmacy and they will forward the refill request to us. Please allow 3 business days for your refill to be completed.          Additional Information About Your Visit        MyChart Information     [email or web address] gives you secure access to your electronic health record. If you see a primary care provider, you can also send messages to your care team and make appointments. If you have questions, please call your primary care clinic.  If you do not have a primary care provider, please call [phone] and they will assist you.        Care EveryWhere ID     This is your Care EveryWhere ID. This could be used by other organizations to access your [place] medical records  [id number]        Your Vitals Were     Pulse Temperature Height Pulse Oximetry BMI (Body Mass Index)       95 97.3  F (36.3  C) (Oral) 5' 6\" (1.676 m) 100% 28.49 kg/m2        Blood Pressure from Last 3 Encounters:   03/14/17 100/60   01/19/17 118/64   05/19/16 " 106/68    Weight from Last 3 Encounters:   03/14/17 176 lb 8 oz (80.1 kg)   01/19/17 173 lb (78.5 kg)   05/19/16 173 lb (78.5 kg)              We Performed the Following     UA reflex to Microscopic and Culture          Today's Medication Changes          These changes are accurate as of: 3/14/17  5:21 PM.  If you have any questions, ask your nurse or doctor.               These medicines have changed or have updated prescriptions.        Dose/Directions    * oxyCODONE-acetaminophen 5-325 MG per tablet   Commonly known as:  PERCOCET   This may have changed:  Another medication with the same name was added. Make sure you understand how and when to take each.   Used for:  Burning with urination   Changed by:  Kelsy Solares PA-C        Dose:  1-2 tablet   Take 1-2 tablets by mouth every 4 hours as needed   Refills:  0       * oxyCODONE-acetaminophen 5-325 MG per tablet   Commonly known as:  PERCOCET   This may have changed:  You were already taking a medication with the same name, and this prescription was added. Make sure you understand how and when to take each.   Used for:  Pelvic pain in female   Changed by:  Kelsy Solares PA-C        Dose:  1-2 tablet   Take 1-2 tablets by mouth every 6 hours as needed for pain maximum 6 tablet(s) per day   Quantity:  24 tablet   Refills:  0       * Notice:  This list has 2 medication(s) that are the same as other medications prescribed for you. Read the directions carefully, and ask your doctor or other care provider to review them with you.         Where to get your medicines      Some of these will need a paper prescription and others can be bought over the counter.  Ask your nurse if you have questions.     Bring a paper prescription for each of these medications     oxyCODONE-acetaminophen 5-325 MG per tablet                Primary Care Provider Office Phone # Fax #    Yanira Perez [phone]       [address]  [place]  [facility] [address]        Thank you!     Thank you for choosing [facility]  for your care. Our goal is always to provide you with excellent care. Hearing back from our patients is one way we can continue to improve our services. Please take a few minutes to complete the written survey that you may receive in the mail after your visit with us. Thank you!             Your Updated Medication List - Protect others around you: Learn how to safely use, store and throw away your medicines at www.disposemymeds.org.          This list is accurate as of: 3/14/17  5:21 PM.  Always use your most recent med list.                   Brand Name Dispense Instructions for use    amitriptyline 50 MG tablet    ELAVIL    130 tablet    Take one or two at bedtime       cyclobenzaprine 10 MG tablet    FLEXERIL     Take 10 mg by mouth 3 times daily as needed       FLUoxetine 20 MG capsule    PROzac    90 capsule    Take 1 capsule (20 mg) by mouth daily       MIRENA (52 MG) 20 MCG/24HR IUD   Generic drug:  levonorgestrel      1 each by Intrauterine route once.       * oxyCODONE-acetaminophen 5-325 MG per tablet    PERCOCET     Take 1-2 tablets by mouth every 4 hours as needed       * oxyCODONE-acetaminophen 5-325 MG per tablet    PERCOCET    24 tablet    Take 1-2 tablets by mouth every 6 hours as needed for pain maximum 6 tablet(s) per day       * Notice:  This list has 2 medication(s) that are the same as other medications prescribed for you. Read the directions carefully, and ask your doctor or other care provider to review them with you.

## 2017-03-29 ENCOUNTER — OFFICE VISIT (OUTPATIENT)
Dept: FAMILY MEDICINE | Facility: CLINIC | Age: 37
End: 2017-03-29
Payer: COMMERCIAL

## 2017-03-29 ENCOUNTER — RADIANT APPOINTMENT (OUTPATIENT)
Dept: GENERAL RADIOLOGY | Facility: CLINIC | Age: 37
End: 2017-03-29
Attending: FAMILY MEDICINE
Payer: COMMERCIAL

## 2017-03-29 VITALS
TEMPERATURE: 97 F | WEIGHT: 178 LBS | HEIGHT: 66 IN | DIASTOLIC BLOOD PRESSURE: 64 MMHG | BODY MASS INDEX: 28.61 KG/M2 | HEART RATE: 85 BPM | SYSTOLIC BLOOD PRESSURE: 99 MMHG | OXYGEN SATURATION: 95 %

## 2017-03-29 DIAGNOSIS — S99.922A INJURY OF LEFT FOOT, INITIAL ENCOUNTER: Primary | ICD-10-CM

## 2017-03-29 DIAGNOSIS — S90.32XA CONTUSION OF LEFT FOOT, INITIAL ENCOUNTER: ICD-10-CM

## 2017-03-29 DIAGNOSIS — S99.922A INJURY OF LEFT FOOT, INITIAL ENCOUNTER: ICD-10-CM

## 2017-03-29 PROCEDURE — 99213 OFFICE O/P EST LOW 20 MIN: CPT | Performed by: FAMILY MEDICINE

## 2017-03-29 PROCEDURE — 73630 X-RAY EXAM OF FOOT: CPT | Mod: LT

## 2017-03-29 RX ORDER — NAPROXEN 500 MG/1
500 TABLET ORAL 2 TIMES DAILY PRN
Qty: 30 TABLET | Refills: 0 | Status: SHIPPED | OUTPATIENT
Start: 2017-03-29 | End: 2018-02-22

## 2017-03-29 ASSESSMENT — PAIN SCALES - GENERAL: PAINLEVEL: SEVERE PAIN (6)

## 2017-03-29 NOTE — PATIENT INSTRUCTIONS
Understanding Bone Bruise (Bone Contusion)  A bone bruise is an injury to a bone that is less severe than a bone fracture. Bone bruises are fairly common. They can happen to people of all ages. Any type of bone in your body can get a bone bruise. Other injuries often happen along with a bone bruise, such as damage to nearby ligaments.  What happens when a bone is bruised?  Bone is made of different kinds of tissue. The periosteum is a thin layer of tissue that covers most of a bone. Where bones come together, there is usually a layer of cartilage at the edges. The bone here is called subchondral bone. Deep inside the bone is an area called the medulla. It contains the bone marrow and fibrous tissue called trabeculae.  With a bone fracture, all of the trabeculae in a region of bone have broken. But with a bone bruise, an injury only damages some of these trabeculae. An injury might cause blood to build up in the area beneath the periosteum. This causes a subperiosteal hematoma, a type of bone bruise. An injury might also cause bleeding and swelling in the area between your cartilage and the bone beneath it. This causes a subchondral bone bruise. Or bleeding and swelling can occur in the medulla of your bone. This is called an interosseous bone bruise.  What causes a bone bruise?  Injury of any kind can cause a bone bruise. Sports injuries, motor vehicle accidents, or falls from a height can cause them. Twisting injuries that cause joint sprains can also cause a bone bruise. Health conditions like arthritis may also lead to a bone bruise. This is because arthritis causes bone surfaces to grind against each other. Child abuse is another cause of bone bruises.  Symptoms of a bone bruise  Symptoms of a bone bruise can include:    Pain and soreness in the injured area    Swelling in the area and soft tissues around it    Change in color of the injured area    Swelling or stiffness of an injured joint  This pain is often  more severe and lasts longer than a soft tissue injury. How severe your symptoms are and how long they last depends on how severe the bone bruise is.  Diagnosing a bone bruise  Your health care provider will ask you about your medical history and symptoms. He or she will ask how you got your injury. Your provider will examine the injured area to check for pain, bruising, and swelling. After the exam, your health care provider may be able to tell if you have a bone bruise.  A bone bruise doesn t show up on an X-ray. But you may be given an X-ray to rule out a bone fracture. A fracture may need a different kind of treatment. An MRI can confirm a bone bruise. But your health care provider will likely only give you an MRI if your symptoms don t get better.    5395-8837 [employer]. [address] [place] [address]. All rights reserved. This information is not intended as a substitute for professional medical care. Always follow your healthcare professional's instructions.        RICE     Rest an injury, elevate it, and use ice and compression as directed.   RICE stands for rest, ice, compression, and elevation. These can limit pain and swelling after an injury. RICE may be recommended to help treat fractures, sprains, strains, and bruises or bumps.   Home care  The following explain the details of RICE:    Rest. Limit the use of the injured body part. This helps prevent further damage to the body part and gives it time to heal. In some cases, you may need a sling, brace, splint, or cast to help keep the body part still until it has healed.    Ice. Applying ice right after an injury helps relieve pain and swelling. Wrap a bag of ice in a thin towel. Then, place it over the injured area. Do this for 10 to 15 minutes every 3 to 4 hours. Continue for the next 1 to 3 days or until your symptoms improve. Never put ice directly on your skin or ice an area longer than 15 minutes at a time.    Compression.  Putting pressure on an injury helps reduce swelling and provides support. Wrap the injured area firmly with an elastic bandage/wrap. Make sure not to wrap the bandage too tightly or you will cut off blood flow to the injured area. If your bandage loosens, rewrap it.    Elevation. Keeping an injury raised above the level of your heart reduces swelling, pain, and throbbing. For instance, if you have a broken leg, it may help to rest your leg on several pillows when sitting or lying down. Try to keep the injured area elevated for at least 2 to 3 hours per day.  Follow-up care  Follow up with your health care provider, or as advised.  When to seek medical advice  Call your health care provider right away if any of these occur:    Fever of 100.4 F (38 C) or higher, or as directed by your health care provider    Increased pain or swelling in the injured body part    Injured body part becomes cold, blue, or numb or tingly    Signs of infection. These include warmth in the skin, redness, drainage, or bad smell coming from the injured body part.    9246-2488 [employer]. [address] [place] [address]. All rights reserved. This information is not intended as a substitute for professional medical care. Always follow your healthcare professional's instructions.

## 2017-03-29 NOTE — MR AVS SNAPSHOT
After Visit Summary   3/29/2017    Jena Pinedo    MRN: [id number]           Patient Information     Date Of Birth          1980        Visit Information        Provider Department      3/29/2017 7:40 AM Engelmann, Lauren Anneliese, MD [facility]        Today's Diagnoses     Injury of left foot, initial encounter    -  1    Contusion of left foot, initial encounter          Care Instructions      Understanding Bone Bruise (Bone Contusion)  A bone bruise is an injury to a bone that is less severe than a bone fracture. Bone bruises are fairly common. They can happen to people of all ages. Any type of bone in your body can get a bone bruise. Other injuries often happen along with a bone bruise, such as damage to nearby ligaments.  What happens when a bone is bruised?  Bone is made of different kinds of tissue. The periosteum is a thin layer of tissue that covers most of a bone. Where bones come together, there is usually a layer of cartilage at the edges. The bone here is called subchondral bone. Deep inside the bone is an area called the medulla. It contains the bone marrow and fibrous tissue called trabeculae.  With a bone fracture, all of the trabeculae in a region of bone have broken. But with a bone bruise, an injury only damages some of these trabeculae. An injury might cause blood to build up in the area beneath the periosteum. This causes a subperiosteal hematoma, a type of bone bruise. An injury might also cause bleeding and swelling in the area between your cartilage and the bone beneath it. This causes a subchondral bone bruise. Or bleeding and swelling can occur in the medulla of your bone. This is called an interosseous bone bruise.  What causes a bone bruise?  Injury of any kind can cause a bone bruise. Sports injuries, motor vehicle accidents, or falls from a height can cause them. Twisting injuries that cause joint sprains can also cause a bone bruise.  Health conditions like arthritis may also lead to a bone bruise. This is because arthritis causes bone surfaces to grind against each other. Child abuse is another cause of bone bruises.  Symptoms of a bone bruise  Symptoms of a bone bruise can include:    Pain and soreness in the injured area    Swelling in the area and soft tissues around it    Change in color of the injured area    Swelling or stiffness of an injured joint  This pain is often more severe and lasts longer than a soft tissue injury. How severe your symptoms are and how long they last depends on how severe the bone bruise is.  Diagnosing a bone bruise  Your health care provider will ask you about your medical history and symptoms. He or she will ask how you got your injury. Your provider will examine the injured area to check for pain, bruising, and swelling. After the exam, your health care provider may be able to tell if you have a bone bruise.  A bone bruise doesn t show up on an X-ray. But you may be given an X-ray to rule out a bone fracture. A fracture may need a different kind of treatment. An MRI can confirm a bone bruise. But your health care provider will likely only give you an MRI if your symptoms don t get better.    6340-6472 [employer]. [address] [place] [address]. All rights reserved. This information is not intended as a substitute for professional medical care. Always follow your healthcare professional's instructions.        RICE     Rest an injury, elevate it, and use ice and compression as directed.   RICE stands for rest, ice, compression, and elevation. These can limit pain and swelling after an injury. RICE may be recommended to help treat fractures, sprains, strains, and bruises or bumps.   Home care  The following explain the details of RICE:    Rest. Limit the use of the injured body part. This helps prevent further damage to the body part and gives it time to heal. In some cases, you may need  a sling, brace, splint, or cast to help keep the body part still until it has healed.    Ice. Applying ice right after an injury helps relieve pain and swelling. Wrap a bag of ice in a thin towel. Then, place it over the injured area. Do this for 10 to 15 minutes every 3 to 4 hours. Continue for the next 1 to 3 days or until your symptoms improve. Never put ice directly on your skin or ice an area longer than 15 minutes at a time.    Compression. Putting pressure on an injury helps reduce swelling and provides support. Wrap the injured area firmly with an elastic bandage/wrap. Make sure not to wrap the bandage too tightly or you will cut off blood flow to the injured area. If your bandage loosens, rewrap it.    Elevation. Keeping an injury raised above the level of your heart reduces swelling, pain, and throbbing. For instance, if you have a broken leg, it may help to rest your leg on several pillows when sitting or lying down. Try to keep the injured area elevated for at least 2 to 3 hours per day.  Follow-up care  Follow up with your health care provider, or as advised.  When to seek medical advice  Call your health care provider right away if any of these occur:    Fever of 100.4 F (38 C) or higher, or as directed by your health care provider    Increased pain or swelling in the injured body part    Injured body part becomes cold, blue, or numb or tingly    Signs of infection. These include warmth in the skin, redness, drainage, or bad smell coming from the injured body part.    7196-7852 [employer]. [address] [place] [address], [place] [address]. All rights reserved. This information is not intended as a substitute for professional medical care. Always follow your healthcare professional's instructions.              Follow-ups after your visit        Who to contact     If you have questions or need follow up information about today's clinic visit or your schedule please contact [facility]  "[facility] directly at [phone].  Normal or non-critical lab and imaging results will be communicated to you by MyChart, letter or phone within 4 business days after the clinic has received the results. If you do not hear from us within 7 days, please contact the clinic through [employer] or phone. If you have a critical or abnormal lab result, we will notify you by phone as soon as possible.  Submit refill requests through [employer] or call your pharmacy and they will forward the refill request to us. Please allow 3 business days for your refill to be completed.          Additional Information About Your Visit        APJeT Information     [employer] gives you secure access to your electronic health record. If you see a primary care provider, you can also send messages to your care team and make appointments. If you have questions, please call your primary care clinic.  If you do not have a primary care provider, please call [phone] and they will assist you.        Care EveryWhere ID     This is your Care EveryWhere ID. This could be used by other organizations to access your [place] medical records  [id number]        Your Vitals Were     Pulse Temperature Height Pulse Oximetry BMI (Body Mass Index)       85 97  F (36.1  C) (Oral) 5' 6\" (1.676 m) 95% 28.73 kg/m2        Blood Pressure from Last 3 Encounters:   03/29/17 99/64   03/14/17 100/60   01/19/17 118/64    Weight from Last 3 Encounters:   03/29/17 178 lb (80.7 kg)   03/14/17 176 lb 8 oz (80.1 kg)   01/19/17 173 lb (78.5 kg)               Primary Care Provider Office Phone # Fax #    Yanira Perez [phone]       [address] [address]        Thank you!     Thank you for choosing [facility]  for your care. Our goal is always to provide you with excellent care. Hearing back from our patients is one way we can continue to improve our services. Please take a few " minutes to complete the written survey that you may receive in the mail after your visit with us. Thank you!             Your Updated Medication List - Protect others around you: Learn how to safely use, store and throw away your medicines at www.disposemymeds.org.          This list is accurate as of: 3/29/17  8:09 AM.  Always use your most recent med list.                   Brand Name Dispense Instructions for use    amitriptyline 50 MG tablet    ELAVIL    130 tablet    Take one or two at bedtime       FLUoxetine 20 MG capsule    PROzac    90 capsule    Take 1 capsule (20 mg) by mouth daily       MIRENA (52 MG) 20 MCG/24HR IUD   Generic drug:  levonorgestrel      1 each by Intrauterine route once.       oxyCODONE-acetaminophen 5-325 MG per tablet    PERCOCET    24 tablet    Take 1-2 tablets by mouth every 6 hours as needed for pain maximum 6 tablet(s) per day

## 2017-03-29 NOTE — NURSING NOTE
"Chief Complaint   Patient presents with     Musculoskeletal Problem       Initial Pulse 85  Temp 97  F (36.1  C) (Oral)  Ht 5' 6\" (1.676 m)  Wt 178 lb (80.7 kg)  SpO2 95%  BMI 28.73 kg/m2 Estimated body mass index is 28.73 kg/(m^2) as calculated from the following:    Height as of this encounter: 5' 6\" (1.676 m).    Weight as of this encounter: 178 lb (80.7 kg).  Medication Reconciliation: complete   Luz Elena Almonte MA      "

## 2017-03-29 NOTE — PROGRESS NOTES
SUBJECTIVE:                                                    Jena Pinedo is a 36 year old female who presents to clinic today for the following health issues:    Joint Pain     Onset: 3/28/17     Description:   Location: Left foot  Character: Stabbing    Intensity: 6/10    Progression of Symptoms: better    Accompanying Signs & Symptoms:  Other symptoms: swelling   History:   Previous similar pain: no       Precipitating factors:   Trauma or overuse: YES- Tripped and fell    Alleviating factors:  Improved by: ice       Therapies Tried and outcome: Icing once and hour, Tylenol and Ibuprofen. No relief.    Hit the top of her foot with a copper drain pipe while at work. She was able to bear weight immediately after but not able to put her shoe on. No ankle pain.     Problem list and histories reviewed & adjusted, as indicated.  Additional history: as documented    Patient Active Problem List   Diagnosis     CARDIOVASCULAR SCREENING; LDL GOAL LESS THAN 160     Tobacco abuse     Allergic rhinitis     Chronic neck pain     Migraines     Recurrent low back pain     Mild recurrent major depression (H)     Past Surgical History:   Procedure Laterality Date     OPEN REDUCTION INTERNAL FIXATION ANKLE  1/2006    LT       Social History   Substance Use Topics     Smoking status: Current Some Day Smoker     Packs/day: 0.10     Years: 13.00     Types: Cigarettes     Smokeless tobacco: Never Used      Comment: 1 pack per week      Alcohol use Yes     Family History   Problem Relation Age of Onset     Hypertension Mother      Thyroid Disease Mother      Neurologic Disorder Mother      migraine     Hypertension Maternal Grandmother      C.A.D. Maternal Grandfather      MI     CANCER Paternal Grandmother      bone     Asthma Sister      DIABETES No family hx of            Reviewed and updated as needed this visit by clinical staff       Reviewed and updated as needed this visit by Provider         ROS:  C: NEGATIVE for  "fever, chills, change in weight  E/M: NEGATIVE for ear, mouth and throat problems  R: NEGATIVE for significant cough or SOB  CV: NEGATIVE for chest pain, palpitations or peripheral edema    OBJECTIVE:                                                    BP 99/64 (BP Location: Left arm, Patient Position: Chair, Cuff Size: Adult Regular)  Pulse 85  Temp 97  F (36.1  C) (Oral)  Ht 5' 6\" (1.676 m)  Wt 178 lb (80.7 kg)  SpO2 95%  BMI 28.73 kg/m2  Body mass index is 28.73 kg/(m^2).  GENERAL: healthy, alert and no distress  RESP: lungs clear to auscultation - no rales, rhonchi or wheezes  CV: regular rate and rhythm, normal S1 S2, no S3 or S4, no murmur, click or rub, no peripheral edema and peripheral pulses strong  MS: LLE exam shows ROM of all joints is normal, no evidence of joint instability and 4cm round bruise over dorsal aspect of foot at the base of the 2-4th toes. No bony abnormalities appreciated.   NEURO: Normal strength and tone, mentation intact and speech normal    Diagnostic Test Results:  Xray - No acute fracture or malalignment on my read     ASSESSMENT/PLAN:                                                        ICD-10-CM    1. Injury of left foot, initial encounter S99.922A XR Foot Left G/E 3 Views     naproxen (NAPROSYN) 500 MG tablet   2. Contusion of left foot, initial encounter S90.32XA naproxen (NAPROSYN) 500 MG tablet     Discussed supportive care, including RICE. No fracture on my read. Will rx Naprosyn for pain to take with food or milk.     See Patient Instructions    Lauren A. Engelmann, MD  [facility]    "

## 2017-03-29 NOTE — LETTER
[facility]  [address] [place] [address]    2017    Re: Jena Pinedo  : 1980    To Whom it May Concern,     I saw Jena in clinic this morning for an acute foot injury. I recommend that she stays home from work today, and she can return for her next scheduled shift on Saturday.     Sincerely,         Lauren Engelmann, MD

## 2017-03-31 ENCOUNTER — OFFICE VISIT (OUTPATIENT)
Dept: FAMILY MEDICINE | Facility: CLINIC | Age: 37
End: 2017-03-31
Payer: COMMERCIAL

## 2017-03-31 VITALS
HEART RATE: 89 BPM | DIASTOLIC BLOOD PRESSURE: 80 MMHG | WEIGHT: 178.4 LBS | OXYGEN SATURATION: 100 % | TEMPERATURE: 98.6 F | SYSTOLIC BLOOD PRESSURE: 120 MMHG | BODY MASS INDEX: 28.79 KG/M2

## 2017-03-31 DIAGNOSIS — N94.819 VULVODYNIA: Primary | ICD-10-CM

## 2017-03-31 PROCEDURE — 99213 OFFICE O/P EST LOW 20 MIN: CPT | Performed by: NURSE PRACTITIONER

## 2017-03-31 RX ORDER — GABAPENTIN 300 MG/1
CAPSULE ORAL
Qty: 90 CAPSULE | Refills: 0 | Status: SHIPPED | OUTPATIENT
Start: 2017-03-31 | End: 2018-02-22

## 2017-03-31 RX ORDER — LIDOCAINE 50 MG/G
OINTMENT TOPICAL 3 TIMES DAILY PRN
Qty: 50 G | Refills: 1 | Status: SHIPPED | OUTPATIENT
Start: 2017-03-31 | End: 2018-02-22

## 2017-03-31 RX ORDER — OXYCODONE AND ACETAMINOPHEN 5; 325 MG/1; MG/1
1-2 TABLET ORAL EVERY 6 HOURS PRN
Qty: 20 TABLET | Refills: 0 | Status: SHIPPED | OUTPATIENT
Start: 2017-03-31 | End: 2017-04-05

## 2017-03-31 ASSESSMENT — PAIN SCALES - GENERAL: PAINLEVEL: EXTREME PAIN (8)

## 2017-03-31 NOTE — PATIENT INSTRUCTIONS
[facility]    If you have any questions regarding to your visit please contact your care team:     Team Pink:   Clinic Hours Telephone Number   Internal Medicine:  Dr. Jennifer Francis NP       7am-7pm  Monday - Thursday   7am-5pm  Fridays  [phone]  (Appointment scheduling available 24/7)    Questions about your visit?  Team Line  [phone]   Urgent Care - [place] and [facility] - 11am-9pm Monday-Friday Saturday-Sunday- 9am-5pm   [place] - 5pm-9pm Monday-Friday Saturday-Sunday- 9am-5pm  [phone] - [place]   [phone] - [place]       What options do I have for visits at the clinic other than the traditional office visit?  To expand how we care for you, many of our providers are utilizing electronic visits (e-visits) and telephone visits, when medically appropriate, for interactions with their patients rather than a visit in the clinic.   We also offer nurse visits for many medical concerns. Just like any other service, we will bill your insurance company for this type of visit based on time spent on the phone with your provider. Not all insurance companies cover these visits. Please check with your medical insurance if this type of visit is covered. You will be responsible for any charges that are not paid by your insurance.      E-visits via [employer]:  generally incur a $35.00 fee.  Telephone visits:  Time spent on the phone: *charged based on time that is spent on the phone in increments of 10 minutes. Estimated cost:   5-10 mins $30.00   11-20 mins. $59.00   21-30 mins. $85.00   Use eInstruction by [employer] (secure email communication and access to your chart) to send your primary care provider a message or make an appointment. Ask someone on your Team how to sign up for [employer].    For a Price Quote for your services, please call our Consumer Price Line at [phone].    As always, Thank you for trusting us with your health care  needs!    Wendy Lyles, CMA

## 2017-03-31 NOTE — PROGRESS NOTES
SUBJECTIVE:                                                    Jena Pinedo is a 36 year old female who presents to clinic today for the following health issues:      Vaginal Symptoms     Onset: x    Description:  Vaginal Discharge: white   Itching (Pruritis): no   Burning sensation:  no   Odor: no     Accompanying Signs & Symptoms:  Pain with Urination: no   Abdominal Pain: no   Fever: no    History:   Sexually active: YES  New Partner: no   Possibility of Pregnancy:  No, has IUD     Precipitating factors:   Recent Antibiotic Use: no     Alleviating factors:  None   Therapies Tried and outcome: None    Patient states she was admitted to [facility] on  3/9/2017 for pelvic pain x2 days after having intercourse. Ultrasound showed right ovarian cyst, but pain is located to left labia.  Had follow-up visit here and with Ob/Gyn with no findings, felt better and now after intercourse x2 days ago she has extreme pain to her left labia again.  Patient has follow-up planned with Ob/Gyn for 4/5/17 and repeat US scheduled for 4/3/17.    Problem list and histories reviewed & adjusted, as indicated.  Additional history: as documented    Patient Active Problem List   Diagnosis     CARDIOVASCULAR SCREENING; LDL GOAL LESS THAN 160     Tobacco abuse     Allergic rhinitis     Chronic neck pain     Migraines     Recurrent low back pain     Mild recurrent major depression (H)     Past Surgical History:   Procedure Laterality Date     OPEN REDUCTION INTERNAL FIXATION ANKLE  1/2006    LT       Social History   Substance Use Topics     Smoking status: Former Smoker     Packs/day: 0.10     Years: 13.00     Types: Cigarettes     Smokeless tobacco: Never Used      Comment: 1 pack per week      Alcohol use Yes     Family History   Problem Relation Age of Onset     Hypertension Mother      Thyroid Disease Mother      Neurologic Disorder Mother      migraine     Hypertension Maternal Grandmother      CLibbyADULCE MARIA Maternal  Grandfather      MI     CANCER Paternal Grandmother      bone     Asthma Sister      DIABETES No family hx of          Current Outpatient Prescriptions   Medication Sig Dispense Refill     lidocaine (XYLOCAINE) 5 % ointment Apply topically 3 times daily as needed for moderate pain 50 g 1     gabapentin (NEURONTIN) 300 MG capsule Take 1 tablet (300 mg) every night for 1-3 days, then 1 tablet twice daily for 1-3 days, then 1 tablet three times daily 90 capsule 0     oxyCODONE-acetaminophen (PERCOCET) 5-325 MG per tablet Take 1-2 tablets by mouth every 6 hours as needed for pain 20 tablet 0     naproxen (NAPROSYN) 500 MG tablet Take 1 tablet (500 mg) by mouth 2 times daily as needed for moderate pain 30 tablet 0     FLUoxetine (PROZAC) 20 MG capsule Take 1 capsule (20 mg) by mouth daily 90 capsule 4     amitriptyline (ELAVIL) 50 MG tablet Take one or two at bedtime 130 tablet 4     levonorgestrel (MIRENA) 20 MCG/24HR IUD 1 each by Intrauterine route once.       Allergies   Allergen Reactions     Nicotine      Patch, arm swelling, itchy, red     BP Readings from Last 3 Encounters:   03/31/17 120/80   03/29/17 99/64   03/14/17 100/60    Wt Readings from Last 3 Encounters:   03/31/17 178 lb 6.4 oz (80.9 kg)   03/29/17 178 lb (80.7 kg)   03/14/17 176 lb 8 oz (80.1 kg)                  Labs reviewed in EPIC    Reviewed and updated as needed this visit by clinical staff  Tobacco  Allergies  Meds  Med Hx  Surg Hx  Fam Hx  Soc Hx      Reviewed and updated as needed this visit by Provider         ROS:  Constitutional, HEENT, cardiovascular, pulmonary, gi and gu systems are negative, except as otherwise noted.    OBJECTIVE:                                                    /80 (BP Location: Left arm, Cuff Size: Adult Regular)  Pulse 89  Temp 98.6  F (37  C) (Oral)  Wt 178 lb 6.4 oz (80.9 kg)  SpO2 100%  Breastfeeding? No  BMI 28.79 kg/m2  Body mass index is 28.79 kg/(m^2).  GENERAL: healthy, alert and no  distress  RESP: lungs clear to auscultation - no rales, rhonchi or wheezes  CV: regular rate and rhythm, normal S1 S2, no S3 or S4, no murmur, click or rub, no peripheral edema and peripheral pulses strong  ABDOMEN: soft, nontender, no hepatosplenomegaly, no masses and bowel sounds normal   (female): normal female external genitalia, normal urethral meatus, vaginal mucosa, normal cervix/adnexa/uterus without masses or discharge, extreme tenderness to palpation of left labia.  MS: no gross musculoskeletal defects noted, no edema    Diagnostic Test Results:  none      ASSESSMENT/PLAN:                                                      1. Vulvodynia  Possibly pudendal neuralgia as well.  Will have patient try lidocaine ointment and start gabapentin to see if pain improves.  Follow-up as planned with Ob/Gyn.  - lidocaine (XYLOCAINE) 5 % ointment; Apply topically 3 times daily as needed for moderate pain  Dispense: 50 g; Refill: 1  - gabapentin (NEURONTIN) 300 MG capsule; Take 1 tablet (300 mg) every night for 1-3 days, then 1 tablet twice daily for 1-3 days, then 1 tablet three times daily  Dispense: 90 capsule; Refill: 0  - oxyCODONE-acetaminophen (PERCOCET) 5-325 MG per tablet; Take 1-2 tablets by mouth every 6 hours as needed for pain  Dispense: 20 tablet; Refill: 0    FUTURE APPOINTMENTS:       - Follow-up for annual visit or as needed    ZEYAD Lew [facility]

## 2017-03-31 NOTE — MR AVS SNAPSHOT
After Visit Summary   3/31/2017    Jena Pinedo    MRN: [id number]           Patient Information     Date Of Birth          1980        Visit Information        Provider Department      3/31/2017 1:40 PM Melissa Francis APRN [facility]        Today's Diagnoses     Vulvodynia    -  1      Care Instructions    [facility]    If you have any questions regarding to your visit please contact your care team:     Team Pink:   Clinic Hours Telephone Number   Internal Medicine:  Dr. Jennifer Francis, NP       7am-7pm  Monday - Thursday   7am-5pm  Fridays  [phone]  (Appointment scheduling available 24/7)    Questions about your visit?  Team Line  [phone]   Urgent Care - [place] - 11am-9pm Monday-Friday Saturday-Sunday- 9am-5pm   [place] - 5pm-9pm Monday-Friday Saturday-Sunday- 9am-5pm  [phone] - [place]   [phone] - [place]       What options do I have for visits at the clinic other than the traditional office visit?  To expand how we care for you, many of our providers are utilizing electronic visits (e-visits) and telephone visits, when medically appropriate, for interactions with their patients rather than a visit in the clinic.   We also offer nurse visits for many medical concerns. Just like any other service, we will bill your insurance company for this type of visit based on time spent on the phone with your provider. Not all insurance companies cover these visits. Please check with your medical insurance if this type of visit is covered. You will be responsible for any charges that are not paid by your insurance.      E-visits via [employer]:  generally incur a $35.00 fee.  Telephone visits:  Time spent on the phone: *charged based on time that is spent on the phone in increments of 10 minutes. Estimated cost:   5-10 mins $30.00   11-20 mins. $59.00   21-30 mins.  $85.00   Use [employer]line[employer] (secure email communication and access to your chart) to send your primary care provider a message or make an appointment. Ask someone on your Team how to sign up for [email or web address].    For a Price Quote for your services, please call our Consumer Price Line at [phone].    As always, Thank you for trusting us with your health care needs!    Wendy Lyles, ADOLFO          Follow-ups after your visit        Who to contact     If you have questions or need follow up information about today's clinic visit or your schedule please contact [facility] directly at [phone].  Normal or non-critical lab and imaging results will be communicated to you by Outlinehart, letter or phone within 4 business days after the clinic has received the results. If you do not hear from us within 7 days, please contact the clinic through [email or web address] or phone. If you have a critical or abnormal lab result, we will notify you by phone as soon as possible.  Submit refill requests through [employer] or call your pharmacy and they will forward the refill request to us. Please allow 3 business days for your refill to be completed.          Additional Information About Your Visit        Outlinehart Information     [email or web address] gives you secure access to your electronic health record. If you see a primary care provider, you can also send messages to your care team and make appointments. If you have questions, please call your primary care clinic.  If you do not have a primary care provider, please call [phone] and they will assist you.        Care EveryWhere ID     This is your Care EveryWhere ID. This could be used by other organizations to access your [place] medical records  [id number]        Your Vitals Were     Pulse Temperature Pulse Oximetry Breastfeeding? BMI (Body Mass Index)       89 98.6  F (37  C) (Oral) 100% No 28.79 kg/m2        Blood Pressure from Last 3 Encounters:   03/31/17 120/80   03/29/17 99/64   03/14/17  100/60    Weight from Last 3 Encounters:   03/31/17 178 lb 6.4 oz (80.9 kg)   03/29/17 178 lb (80.7 kg)   03/14/17 176 lb 8 oz (80.1 kg)              Today, you had the following     No orders found for display         Today's Medication Changes          These changes are accurate as of: 3/31/17  2:22 PM.  If you have any questions, ask your nurse or doctor.               Start taking these medicines.        Dose/Directions    gabapentin 300 MG capsule   Commonly known as:  NEURONTIN   Used for:  Vulvodynia   Started by:  Melissa Francis APRN CNP        Take 1 tablet (300 mg) every night for 1-3 days, then 1 tablet twice daily for 1-3 days, then 1 tablet three times daily   Quantity:  90 capsule   Refills:  0       lidocaine 5 % ointment   Commonly known as:  XYLOCAINE   Used for:  Vulvodynia   Started by:  Melissa Francis APRN CNP        Apply topically 3 times daily as needed for moderate pain   Quantity:  50 g   Refills:  1       oxyCODONE-acetaminophen 5-325 MG per tablet   Commonly known as:  PERCOCET   Used for:  Vulvodynia   Started by:  Melissa Francis APRN CNP        Dose:  1-2 tablet   Take 1-2 tablets by mouth every 6 hours as needed for pain   Quantity:  20 tablet   Refills:  0            Where to get your medicines      These medications were sent to [facility] [address] [place] [address] [address] [place] [address], [facility] [address]     Phone:  [phone]     gabapentin 300 MG capsule    lidocaine 5 % ointment         Some of these will need a paper prescription and others can be bought over the counter.  Ask your nurse if you have questions.     Bring a paper prescription for each of these medications     oxyCODONE-acetaminophen 5-325 MG per tablet                Primary Care Provider Office Phone # Fax #    Yanira Perez [phone]       [address]  [address]        Thank you!     Thank you for choosing [facility]  for your care. Our goal is always to provide you with excellent care. Hearing back from our patients is one way we can continue to improve our services. Please take a few minutes to complete the written survey that you may receive in the mail after your visit with us. Thank you!             Your Updated Medication List - Protect others around you: Learn how to safely use, store and throw away your medicines at www.disposemymeds.org.          This list is accurate as of: 3/31/17  2:22 PM.  Always use your most recent med list.                   Brand Name Dispense Instructions for use    amitriptyline 50 MG tablet    ELAVIL    130 tablet    Take one or two at bedtime       FLUoxetine 20 MG capsule    PROzac    90 capsule    Take 1 capsule (20 mg) by mouth daily       gabapentin 300 MG capsule    NEURONTIN    90 capsule    Take 1 tablet (300 mg) every night for 1-3 days, then 1 tablet twice daily for 1-3 days, then 1 tablet three times daily       lidocaine 5 % ointment    XYLOCAINE    50 g    Apply topically 3 times daily as needed for moderate pain       MIRENA (52 MG) 20 MCG/24HR IUD   Generic drug:  levonorgestrel      1 each by Intrauterine route once.       naproxen 500 MG tablet    NAPROSYN    30 tablet    Take 1 tablet (500 mg) by mouth 2 times daily as needed for moderate pain       oxyCODONE-acetaminophen 5-325 MG per tablet    PERCOCET    20 tablet    Take 1-2 tablets by mouth every 6 hours as needed for pain

## 2017-03-31 NOTE — NURSING NOTE
"Chief Complaint   Patient presents with     Pelvic Pain       Initial /80 (BP Location: Left arm, Cuff Size: Adult Regular)  Pulse 89  Temp 98.6  F (37  C) (Oral)  Wt 178 lb 6.4 oz (80.9 kg)  SpO2 100%  Breastfeeding? No  BMI 28.79 kg/m2 Estimated body mass index is 28.79 kg/(m^2) as calculated from the following:    Height as of 3/29/17: 5' 6\" (1.676 m).    Weight as of this encounter: 178 lb 6.4 oz (80.9 kg).  Medication Reconciliation: complete       Wendy Lyles CMA      "

## 2017-03-31 NOTE — LETTER
[facility]  [address]  [place] [address]  [phone]  Dept: [phone]      3/31/2017    Re: Jena Pinedo      TO WHOM IT MAY CONCERN:    Jena Pinedo  was seen on 3/31/17.  Please excuse her  Until 4/4/17 due to illness.    ZEYAD Hopkins CNP  [facility]

## 2017-04-05 ENCOUNTER — OFFICE VISIT (OUTPATIENT)
Dept: OBGYN | Facility: CLINIC | Age: 37
End: 2017-04-05
Attending: NURSE PRACTITIONER
Payer: COMMERCIAL

## 2017-04-05 VITALS
DIASTOLIC BLOOD PRESSURE: 62 MMHG | HEIGHT: 66 IN | SYSTOLIC BLOOD PRESSURE: 98 MMHG | HEART RATE: 95 BPM | BODY MASS INDEX: 29.41 KG/M2 | WEIGHT: 183 LBS

## 2017-04-05 DIAGNOSIS — N94.819 VULVODYNIA: Primary | ICD-10-CM

## 2017-04-05 DIAGNOSIS — R10.2 VAGINAL PAIN: ICD-10-CM

## 2017-04-05 PROBLEM — F32.A DEPRESSION: Status: ACTIVE | Noted: 2017-04-05

## 2017-04-05 PROBLEM — F41.9 ANXIETY DISORDER: Status: ACTIVE | Noted: 2017-04-05

## 2017-04-05 ASSESSMENT — PAIN SCALES - GENERAL: PAINLEVEL: SEVERE PAIN (6)

## 2017-04-05 NOTE — PROGRESS NOTES
"S: Jena is a 36 yr old female who presents today for evaluation of pelvic/ labial pain.       Jena had been originally seen in the ER on 3/9/2017 with this same pain that started 2 days prior, after having intercourse.  Denies pain during intercourse or any use of new products or toys.  She was evaluated in the ER, with a negative gynecologic evaluation and admitted to [facility] for 4 days for pain control (received IV pain medication).  Patient states there was never any finding for the cause of her pain and she was discharged when her pain remained below a 4/10 with oral pain medication.  The pain improved after being discharged, but after having intercourse again, the pain returned.  She describes the pain today as a stabbing pain, \"like someone is stabbing her left labia with a knife.\"  The pain is more \"deep\" rather than superficial.  She states the pain is always there, but it is worse with sitting or activity.       She has had serial pelvic ultrasounds which indicated a right ovary hemorrhagic cyst (now smaller in size than originally seen) and a left ovarian cyst.  Abdomen is non-tender.  There was no evidence of PID, vaginal infection, Bartholin's cysts, UTI, or STDs. UPT was negative.  Jena was diagnosed with Vulvodynia with possible pudendal neuralgia as well on 3/31/2017.  She has previously been prescribed Flexeril (no longer taking) as well as Gabapentin.  She has built up the dose that she is taking and today started taking three tablets daily.   She has received care for this labia/ pelvic pain at [place] [facility], [facility], and [facility].    Pain Control: Had been taking Percocet 1-2 tablets PO every 4 hours as needed; recently ran out of this prescription.  Has also tried Naproxen and Lidocaine Gel with some but minimal relief.      Contraception: Mirena IUD, placed 1 yr ago; does not have menstrual periods    Past Medical History:   Diagnosis " "Date     Allergic rhinitis 2004    tree, grass, dust     Chronic neck pain 11/29/2011    physical therapy trial       Eczema      Major depressive disorder, recurrent episode, moderate degree (H)      Migraine headaches 1993     Obesity 3/5/2013     Recurrent low back pain    States she has a hx of back spasms, but has not currently had any back pain.     Current Outpatient Prescriptions   Medication     lidocaine (XYLOCAINE) 5 % ointment     gabapentin (NEURONTIN) 300 MG capsule     naproxen (NAPROSYN) 500 MG tablet     FLUoxetine (PROZAC) 20 MG capsule     amitriptyline (ELAVIL) 50 MG tablet     levonorgestrel (MIRENA) 20 MCG/24HR IUD     oxyCODONE-acetaminophen (PERCOCET) 5-325 MG per tablet     No current facility-administered medications for this visit.      O:  BP 98/62  Pulse 95  Ht 1.676 m (5' 6\")  Wt 83 kg (183 lb)  Breastfeeding? No  BMI 29.54 kg/m2  Exam:  Pelvic Exam:  Vulva: Normal genital architecture without lesions or erythema.  Normal hair distribution, no adenopathy; left labia tenderness to palpation  Vagina: Moist, pink, no abnormal discharge, well rugated, no lesions; no muscular tension noted with bimanual exam; tenderness on left vaginal wall with bimanual exam  Cervix: smooth, pink, no visible lesions  Uterus: Normal size, anteverted, non-tender, mobile  Ovaries: No mass, non-tender, mobile    ASSESSMENT: Vulvodynia    PLAN:   Orders Placed This Encounter   Procedures     [facility] PT, HAND, AND CHIROPRACTIC REFERRAL     [facility] PAIN AND INTERVENTIONAL CLINIC REFERRAL   Continue taking Gabapentin and using Lidocaine Gel as previously prescribed.  A Physical Therapy referral was placed as well as a Pain Management Clinic Referral.  Recommended she follow-up with an ObGyn Physician at [facility] in 2 months, or sooner if her pain worsens or is not starting to improve.      30 minutes was spent in direct contact with the patient and > 50% of the time in patient education and " coordination of care.    Ranjana Magana, DNP, APRN, WHNP

## 2017-04-05 NOTE — LETTER
"4/5/2017       RE: Jena Pinedo  [address] [place] [address]     Dear Colleague,    Thank you for referring your patient, Jena Pinedo, to the [facility] at [facility]. Please see a copy of my visit note below.    S: Jena is a 36 yr old female who presents today for evaluation of pelvic/ labial pain.       Jena had been originally seen in the ER on 3/9/2017 with this same pain that started 2 days prior, after having intercourse.  Denies pain during intercourse or any use of new products or toys.  She was evaluated in the ER, with a negative gynecologic evaluation and admitted to [facility] for 4 days for pain control (received IV pain medication).  Patient states there was never any finding for the cause of her pain and she was discharged when her pain remained below a 4/10 with oral pain medication.  The pain improved after being discharged, but after having intercourse again, the pain returned.  She describes the pain today as a stabbing pain, \"like someone is stabbing her left labia with a knife.\"  The pain is more \"deep\" rather than superficial.  She states the pain is always there, but it is worse with sitting or activity.       She has had serial pelvic ultrasounds which indicated a right ovary hemorrhagic cyst (now smaller in size than originally seen) and a left ovarian cyst.  Abdomen is non-tender.  There was no evidence of PID, vaginal infection, Bartholin's cysts, UTI, or STDs. UPT was negative.  Jena was diagnosed with Vulvodynia with possible pudendal neuralgia as well on 3/31/2017.  She has previously been prescribed Flexeril (no longer taking) as well as Gabapentin.  She has built up the dose that she is taking and today started taking three tablets daily.   She has received care for this labia/ pelvic pain at [place] [facility], [facility], and [facility].    Pain " "Control: Had been taking Percocet 1-2 tablets PO every 4 hours as needed; recently ran out of this prescription.  Has also tried Naproxen and Lidocaine Gel with some but minimal relief.      Contraception: Mirena IUD, placed 1 yr ago; does not have menstrual periods    Past Medical History:   Diagnosis Date     Allergic rhinitis 2004    tree, grass, dust     Chronic neck pain 11/29/2011    physical therapy trial       Eczema      Major depressive disorder, recurrent episode, moderate degree (H)      Migraine headaches 1993     Obesity 3/5/2013     Recurrent low back pain    States she has a hx of back spasms, but has not currently had any back pain.     Current Outpatient Prescriptions   Medication     lidocaine (XYLOCAINE) 5 % ointment     gabapentin (NEURONTIN) 300 MG capsule     naproxen (NAPROSYN) 500 MG tablet     FLUoxetine (PROZAC) 20 MG capsule     amitriptyline (ELAVIL) 50 MG tablet     levonorgestrel (MIRENA) 20 MCG/24HR IUD     oxyCODONE-acetaminophen (PERCOCET) 5-325 MG per tablet     No current facility-administered medications for this visit.      O:  BP 98/62  Pulse 95  Ht 1.676 m (5' 6\")  Wt 83 kg (183 lb)  Breastfeeding? No  BMI 29.54 kg/m2  Exam:  Pelvic Exam:  Vulva: Normal genital architecture without lesions or erythema.  Normal hair distribution, no adenopathy; left labia tenderness to palpation  Vagina: Moist, pink, no abnormal discharge, well rugated, no lesions; no muscular tension noted with bimanual exam; tenderness on left vaginal wall with bimanual exam  Cervix: smooth, pink, no visible lesions  Uterus: Normal size, anteverted, non-tender, mobile  Ovaries: No mass, non-tender, mobile    ASSESSMENT: Vulvodynia    PLAN:   Orders Placed This Encounter   Procedures     [facility] PT, HAND, AND CHIROPRACTIC REFERRAL     [facility] PAIN AND INTERVENTIONAL CLINIC REFERRAL   Continue taking Gabapentin and using Lidocaine Gel as previously prescribed.  A Physical Therapy referral was placed as well as " a Pain Management Clinic Referral.  Recommended she follow-up with an ObGyn Physician at [facility] in 2 months, or sooner if her pain worsens or is not starting to improve.      30 minutes was spent in direct contact with the patient and > 50% of the time in patient education and coordination of care.    Ranjana Magana, SANDEE, APRN, WHNP

## 2017-04-05 NOTE — MR AVS SNAPSHOT
After Visit Summary   4/5/2017    Jena Pinedo    MRN: [id number]           Patient Information     Date Of Birth          1980        Visit Information        Provider Department      4/5/2017 1:30 PM Ranjana Magana APRN CNP [facility]        Today's Diagnoses     Vaginal pain    -  1    Vulvodynia          Care Instructions    Please schedule an appointment with Physical Therapy and the Pain Management Clinic.  Continue Gabapentin and Lidocaine Gel as prescribed.  Schedule follow-up appointment in 2 months with ObGyn physician at [facility], or sooner, if worsening symptoms.        Follow-ups after your visit        Additional Services     [facility] PT, HAND, AND CHIROPRACTIC REFERRAL       **This order will print in the [facility] Scheduling Office**    Physical Therapy, Hand Therapy and Chiropractic Care are available through:    *[facility]  *[facility]  *[facility]    Call one number to schedule at any of the above locations: [phone].    Your provider has referred you to: Physical Therapy at [facility] or [facility]    Indication/Reason for Referral: Women's Health (Please Complete Special Programs SmartList)  Onset of Illness: 3 weeks ago  Therapy Orders: Evaluate and Treat  Special Programs: None and Women's Health: OB/GYN Musculoskeletal Dysfunction: Vulvodynia/ Pelvic pain and  None  Special Request: None    Raphael Lim      Additional Comments for the Therapist or Chiropractor: Left sided deep vulvar pain; history of chronic back pain    Please be aware that coverage of these services is subject to the terms and limitations of your health insurance plan.  Call member services at your health plan with any benefit or coverage questions.      Please bring the following to your appointment:    *Your personal calendar for scheduling future appointments  *Comfortable clothing            [facility] REFERRAL       Your provider has referred you to: [facility] for Comprehensive Pain Management. Please call [phone] to make an appointment.     Clinic is located: [facility] [address] [place] [address] [id number]th [place] [address]      Please complete the following questions:    Procedure/Referral: Referral Only -  Comprehensive Evaluation and Management    What is your diagnosis for the patient's pain? Vulvodynia    What are your specific questions for the pain specialist? Patient seeking better pain control    Are there any red flags that may impact the assessment or management of the patient? None    REGARDING OPIOID MEDICATIONS:  We will always address appropriateness of opioid pain medications. We do not prescribe on the patient's first visit and generally will not take on a prescribing role for stable chronic pain. When we do take on prescribing of opioids for chronic pain, it is in collaboration with the referring physician for an determined period of time (usually weeks to months), with an expectation that the primary physician or provider will assume the prescribing role if medications are effective at stable doses with demonstrated compliance.  Therefore, please do not assume that your prescribing responsibilities end on the day of pain clinic consultation.  Is this agreeable to you? YES      Please be aware that coverage of these services is subject to the terms and limitations of your health insurance plan.  Call member services at your health plan with any benefit or coverage questions.      Please bring the following with you to your appointment or have sent to the [facility]:    (1) Any X-Rays, CTs or MRIs which have been performed that are not in Epic.  Contact the facility where they were done to arrange for  prior to your scheduled appointment.  Any new CT, MRI or other procedures ordered by your specialist must  "be performed at a [facility] facility or coordinated by your clinic's referral office.    (2) List of current medications   (3) This referral request   (4) Any documents/labs given to you for this referral                  Follow-up notes from your care team     Return in about 2 months (around 6/5/2017).      Who to contact     Please call your clinic at [phone] to:    Ask questions about your health    Make or cancel appointments    Discuss your medicines    Learn about your test results    Speak to your doctor   If you have compliments or concerns about an experience at your clinic, or if you wish to file a complaint, please contact [facility] Patient Relations at [phone] or email us at [email or web address]         Additional Information About Your Visit        B[employer]or[employer]CrowdHall Information     [employer] gives you secure access to your electronic health record. If you see a primary care provider, you can also send messages to your care team and make appointments. If you have questions, please call your primary care clinic.  If you do not have a primary care provider, please call [phone] and they will assist you.      Ranberry is an electronic gateway that provides easy, online access to your medical records. With Ranberry, you can request a clinic appointment, read your test results, renew a prescription or communicate with your care team.     To access your existing account, please contact your [facility] or call [phone] for assistance.        Care EveryWhere ID     This is your Care EveryWhere ID. This could be used by other organizations to access your [place] medical records  [id number]        Your Vitals Were     Pulse Height Breastfeeding? BMI (Body Mass Index)          95 1.676 m (5' 6\") No 29.54 kg/m2         Blood Pressure from Last 3 Encounters:   04/05/17 98/62   03/31/17 120/80   03/29/17 99/64    Weight from Last 3 Encounters: "   04/05/17 83 kg (183 lb)   03/31/17 80.9 kg (178 lb 6.4 oz)   03/29/17 80.7 kg (178 lb)              We Performed the Following     [facility] PT, HAND, AND CHIROPRACTIC REFERRAL     [facility] REFERRAL        Primary Care Provider Office Phone # Fax #    Yanira Perez [phone]       [address] [address]        Thank you!     Thank you for choosing [facility]  for your care. Our goal is always to provide you with excellent care. Hearing back from our patients is one way we can continue to improve our services. Please take a few minutes to complete the written survey that you may receive in the mail after your visit with us. Thank you!             Your Updated Medication List - Protect others around you: Learn how to safely use, store and throw away your medicines at www.disposemymeds.org.          This list is accurate as of: 4/5/17  2:35 PM.  Always use your most recent med list.                   Brand Name Dispense Instructions for use    amitriptyline 50 MG tablet    ELAVIL    130 tablet    Take one or two at bedtime       FLUoxetine 20 MG capsule    PROzac    90 capsule    Take 1 capsule (20 mg) by mouth daily       gabapentin 300 MG capsule    NEURONTIN    90 capsule    Take 1 tablet (300 mg) every night for 1-3 days, then 1 tablet twice daily for 1-3 days, then 1 tablet three times daily       lidocaine 5 % ointment    XYLOCAINE    50 g    Apply topically 3 times daily as needed for moderate pain       MIRENA (52 MG) 20 MCG/24HR IUD   Generic drug:  levonorgestrel      1 each by Intrauterine route once.       naproxen 500 MG tablet    NAPROSYN    30 tablet    Take 1 tablet (500 mg) by mouth 2 times daily as needed for moderate pain

## 2017-04-05 NOTE — PATIENT INSTRUCTIONS
Please schedule an appointment with Physical Therapy and the Pain Management Clinic.  Continue Gabapentin and Lidocaine Gel as prescribed.  Schedule follow-up appointment in 2 months with ObGyn physician at [facility], or sooner, if worsening symptoms.

## 2017-04-05 NOTE — LETTER
[facility]  [place] [facility]   [address]  [address]  [facility] [address]  Phone: [phone]  Fax: [phone]    April 5, 2017        Jena Pinedo  [address] [place] [address]        To Whom it May Concern:    RE: Jena Whitney Pinedo    Patient was seen and treated today at our clinic.  Patient needs off of work the rest of the week due to pain.    Please contact me for questions or concerns.      Sincerely,      ZEYAD Walton CNP

## 2017-04-06 ENCOUNTER — TELEPHONE (OUTPATIENT)
Dept: FAMILY MEDICINE | Facility: CLINIC | Age: 37
End: 2017-04-06

## 2017-04-06 DIAGNOSIS — N94.819 VULVODYNIA: ICD-10-CM

## 2017-04-06 NOTE — TELEPHONE ENCOUNTER
Patient is requesting a refill on Her oxycodone.  She states she has seen two OB Gyn physicians and now has an appointment for Pain Clinic on 6/20/2017.  Would like a refill on Medication until seen at pain clinic.    Informed Patient would forward message to Physician.      Diana Pinedo MA

## 2017-04-07 RX ORDER — OXYCODONE AND ACETAMINOPHEN 5; 325 MG/1; MG/1
1 TABLET ORAL EVERY 6 HOURS PRN
Qty: 30 TABLET | Refills: 0 | Status: SHIPPED | OUTPATIENT
Start: 2017-04-07 | End: 2017-04-10

## 2017-04-07 NOTE — TELEPHONE ENCOUNTER
I have given one refill, but would like to see her for additional refills.    Melissa Francis, CNP

## 2017-04-10 RX ORDER — OXYCODONE AND ACETAMINOPHEN 5; 325 MG/1; MG/1
1 TABLET ORAL EVERY 6 HOURS PRN
Qty: 30 TABLET | Refills: 0 | Status: SHIPPED | OUTPATIENT
Start: 2017-04-10 | End: 2018-02-22

## 2017-04-10 NOTE — TELEPHONE ENCOUNTER
Spoke to patient in regards to approved prescription. Advised patient she is getting one refill for 30 days and needs to be seen for any further refills. Patient states she is going to pain clinic and physical therapy and is hoping to soon get off of this medication. Will call back and schedule appointment if unable to get off med.    **Unable to locate prescription. Please advise.      Wendy Lyles, ADOLFO

## 2017-06-09 ENCOUNTER — PRE VISIT (OUTPATIENT)
Dept: ANESTHESIOLOGY | Facility: CLINIC | Age: 37
End: 2017-06-09

## 2017-06-09 NOTE — TELEPHONE ENCOUNTER
1.  Date/reason for appt: 6/20/17 8AM Vulvodynia & Vaginal pain  2.  Referring provider: PAUL SALINAS CNP    3.  Call to patient (Yes / No - short description): No, pt has recs at [facility].   4.  Previous care at / records requested from:  [facility] - Attempt to fax cover sheet to italia lock

## 2017-06-12 NOTE — TELEPHONE ENCOUNTER
Records received from [facility].    Included  ED notes: 3/9/17-3/11/17  Radiology reports: US pelvis on 3/9/17   CT abdomen pelvis on 3/9/17  Other: labs

## 2017-06-19 ENCOUNTER — TELEPHONE (OUTPATIENT)
Dept: ANESTHESIOLOGY | Facility: CLINIC | Age: 37
End: 2017-06-19

## 2017-06-19 NOTE — TELEPHONE ENCOUNTER
Reminder call placed to pt.   Pt reminded of Provider, date and time of the appointment.   Pt was asked to arrive 15 minutes early to fill out the questionnaires.   Clinic phone number provided if pt needed to reschedule.     Pt stated she won't be able to make it and didn't want to reschedule.   Alisson Davidson, [facility]

## 2017-07-26 ENCOUNTER — TELEPHONE (OUTPATIENT)
Dept: FAMILY MEDICINE | Facility: CLINIC | Age: 37
End: 2017-07-26

## 2017-07-26 NOTE — TELEPHONE ENCOUNTER
Hakeem Bella, I m Nathalie Pearson. I work with Melissa Francis at [facility]. He/she noticed in your chart that you are due for a patient health questionnaire. We would like to complete that today as Melissa Francis cares about your health.        Called patient; Left message x1. (If patient has sucidal thoughts discuss with Team RN ASAP) <5 PASS, >5 FAIL Needs follow up appointment.  If patient refuses appointment please ask them if they would be willing to speak to the Team RN for further support over the phone.     If PHQ-9 is less than 5, close encounter. If PHQ-9 is 5 or greater, recommend that patient schedule follow up appointment with primary provider (in office, e-visit or phone visit) for medication follow up and evaluation. If positive suicidal thoughts, huddle with RN or provider today and route encounter.     Appointment Made? No    Nathalie Pearson

## 2017-07-28 NOTE — TELEPHONE ENCOUNTER
Hakeem Bella, I m Nathalie Pearson. I work with Melissa Francis at [facility]. He/she noticed in your chart that you are due for a patient health questionnaire. We would like to complete that today as Melissa Francis cares about your health.        Called patient; Called patient, completed PHQ9. PHQ9 score: 11. (If patient has sucidal thoughts discuss with Team RN ASAP) <5 PASS, >5 FAIL Needs follow up appointment.  If patient refuses appointment please ask them if they would be willing to speak to the Team RN for further support over the phone.     If PHQ-9 is less than 5, close encounter. If PHQ-9 is 5 or greater, recommend that patient schedule follow up appointment with primary provider (in office, e-visit or phone visit) for medication follow up and evaluation. If positive suicidal thoughts, huddle with RN or provider today and route encounter.     Appointment Made? No patient does not need appointment, patient stated she is very well on these meds    Nathalie Pearson

## 2017-07-29 ASSESSMENT — PATIENT HEALTH QUESTIONNAIRE - PHQ9: SUM OF ALL RESPONSES TO PHQ QUESTIONS 1-9: 11

## 2017-09-05 DIAGNOSIS — G43.909 MIGRAINE WITHOUT STATUS MIGRAINOSUS, NOT INTRACTABLE, UNSPECIFIED MIGRAINE TYPE: ICD-10-CM

## 2017-09-05 DIAGNOSIS — F33.0 MILD RECURRENT MAJOR DEPRESSION (H): ICD-10-CM

## 2017-09-06 RX ORDER — AMITRIPTYLINE HYDROCHLORIDE 50 MG/1
TABLET ORAL
Qty: 130 TABLET | Refills: 0 | Status: SHIPPED | OUTPATIENT
Start: 2017-09-06 | End: 2018-02-22

## 2017-09-06 NOTE — TELEPHONE ENCOUNTER
Routing refill request to provider for review/approval because:  PHQ-9 SCORE 5/19/2016 1/19/2017 7/28/2017   Total Score - - -   Total Score 25 14 11

## 2017-09-06 NOTE — TELEPHONE ENCOUNTER
Amitriptyline      Last Written Prescription Date: 5/19/2016  Last Quantity: 130, # refills: 4  Last Office Visit with G, P or [facility] prescribing provider: 3/31/2017       No results found for: CR  Lab Results   Component Value Date    AST 14 01/19/2017     Lab Results   Component Value Date    ALT 19 01/19/2017     BP Readings from Last 3 Encounters:   04/05/17 98/62   03/31/17 120/80   03/29/17 99/64

## 2017-09-25 ENCOUNTER — NURSE TRIAGE (OUTPATIENT)
Dept: NURSING | Facility: CLINIC | Age: 37
End: 2017-09-25

## 2017-09-25 NOTE — TELEPHONE ENCOUNTER
Jena is having an allergic reaction.  Jena is having chest tightness   And skin is red and is having breathing problems.  Jena went into work  And was exposed to bleach which she is allergic to.

## 2018-01-15 ENCOUNTER — OFFICE VISIT (OUTPATIENT)
Dept: FAMILY MEDICINE | Facility: CLINIC | Age: 38
End: 2018-01-15
Payer: COMMERCIAL

## 2018-01-15 VITALS
WEIGHT: 194 LBS | DIASTOLIC BLOOD PRESSURE: 65 MMHG | TEMPERATURE: 97.8 F | HEART RATE: 67 BPM | RESPIRATION RATE: 15 BRPM | SYSTOLIC BLOOD PRESSURE: 112 MMHG | BODY MASS INDEX: 31.31 KG/M2 | OXYGEN SATURATION: 98 %

## 2018-01-15 DIAGNOSIS — J02.9 SORETHROAT: Primary | ICD-10-CM

## 2018-01-15 LAB
DEPRECATED S PYO AG THROAT QL EIA: NORMAL
FLUAV+FLUBV AG SPEC QL: NEGATIVE
FLUAV+FLUBV AG SPEC QL: NEGATIVE
SPECIMEN SOURCE: NORMAL
SPECIMEN SOURCE: NORMAL

## 2018-01-15 PROCEDURE — 87804 INFLUENZA ASSAY W/OPTIC: CPT | Performed by: NURSE PRACTITIONER

## 2018-01-15 PROCEDURE — 87081 CULTURE SCREEN ONLY: CPT | Performed by: NURSE PRACTITIONER

## 2018-01-15 PROCEDURE — 87880 STREP A ASSAY W/OPTIC: CPT | Performed by: NURSE PRACTITIONER

## 2018-01-15 PROCEDURE — 99213 OFFICE O/P EST LOW 20 MIN: CPT | Performed by: NURSE PRACTITIONER

## 2018-01-15 NOTE — NURSING NOTE
"Chief Complaint   Patient presents with     Pharyngitis     son has strep and Influenza A       Initial /65  Pulse 67  Temp 97.8  F (36.6  C) (Oral)  Resp 15  Wt 194 lb (88 kg)  SpO2 98%  Breastfeeding? No  BMI 31.31 kg/m2 Estimated body mass index is 31.31 kg/(m^2) as calculated from the following:    Height as of 4/5/17: 5' 6\" (1.676 m).    Weight as of this encounter: 194 lb (88 kg).  Medication Reconciliation: complete   Patient and/or MA were masked during rooming process  Layla Lockett MA        "

## 2018-01-15 NOTE — MR AVS SNAPSHOT
After Visit Summary   1/15/2018    Jena Pinedo    MRN: [id number]           Patient Information     Date Of Birth          1980        Visit Information        Provider Department      1/15/2018 3:20 PM Marybel Mcneill APRN CNP [facility]        Today's Diagnoses     Sorethroat    -  1       Follow-ups after your visit        Who to contact     If you have questions or need follow up information about today's clinic visit or your schedule please contact [facility] directly at [phone].  Normal or non-critical lab and imaging results will be communicated to you by [employer], letter or phone within 4 business days after the clinic has received the results. If you do not hear from us within 7 days, please contact the clinic through RunRevt or phone. If you have a critical or abnormal lab result, we will notify you by phone as soon as possible.  Submit refill requests through Instapage or call your pharmacy and they will forward the refill request to us. Please allow 3 business days for your refill to be completed.          Additional Information About Your Visit        MyChart Information     Instapage gives you secure access to your electronic health record. If you see a primary care provider, you can also send messages to your care team and make appointments. If you have questions, please call your primary care clinic.  If you do not have a primary care provider, please call [phone] and they will assist you.        Care EveryWhere ID     This is your Care EveryWhere ID. This could be used by other organizations to access your [place] medical records  [id number]        Your Vitals Were     Pulse Temperature Respirations Pulse Oximetry Breastfeeding? BMI (Body Mass Index)    67 97.8  F (36.6  C) (Oral) 15 98% No 31.31 kg/m2       Blood Pressure from Last 3 Encounters:   01/15/18 112/65   04/05/17 98/62   03/31/17 120/80    Weight from Last 3  Encounters:   01/15/18 194 lb (88 kg)   04/05/17 183 lb (83 kg)   03/31/17 178 lb 6.4 oz (80.9 kg)              We Performed the Following     Beta strep group A culture     Influenza A/B antigen     Strep, Rapid Screen        Primary Care Provider Office Phone # Fax #    Krysten Duncan [phone]       [address] [address]        Equal Access to Services     NATI BRUNSON : Hadii aad ku hadasho Soomaali, waaxda luqadaha, qaybta kaalmada adeegyada, waxay idiin hayaan jumana rosario . So [facility] [phone].    ATENCIÓN: Si habla español, tiene a gonzalez disposición servicios gratuitos de asistencia lingüística. Llame al [phone].    We comply with applicable federal civil rights laws and [place] laws. We do not discriminate on the basis of race, color, national origin, age, disability, sex, sexual orientation, or gender identity.            Thank you!     Thank you for [facility]  [facility]. Our goal is always to provide you with excellent care. Hearing back from our patients is one way we can continue to improve our services. Please take a few minutes to complete the written survey that you may receive in the mail after your visit with us. Thank you!             Your Updated Medication List - Protect others around you: Learn how to safely use, store and throw away your medicines at www.disposemymeds.org.          This list is accurate as of: 1/15/18  4:04 PM.  Always use your most recent med list.                   Brand Name Dispense Instructions for use Diagnosis    amitriptyline 50 MG tablet    ELAVIL    130 tablet    TAKE ONE OR TWO TABLETS BY MOUTH AT BEDTIME    Mild recurrent major depression (H), Migraine without status migrainosus, not intractable, unspecified migraine type       FLUoxetine 20 MG capsule    PROzac    90 capsule    Take 1 capsule (20 mg) by mouth daily    Mild recurrent major depression  (H)       gabapentin 300 MG capsule    NEURONTIN    90 capsule    Take 1 tablet (300 mg) every night for 1-3 days, then 1 tablet twice daily for 1-3 days, then 1 tablet three times daily    Vulvodynia       lidocaine 5 % ointment    XYLOCAINE    50 g    Apply topically 3 times daily as needed for moderate pain    Vulvodynia       MIRENA (52 MG) 20 MCG/24HR IUD   Generic drug:  levonorgestrel      1 each by Intrauterine route once.        naproxen 500 MG tablet    NAPROSYN    30 tablet    Take 1 tablet (500 mg) by mouth 2 times daily as needed for moderate pain    Injury of left foot, initial encounter, Contusion of left foot, initial encounter       oxyCODONE-acetaminophen 5-325 MG per tablet    PERCOCET    30 tablet    Take 1 tablet by mouth every 6 hours as needed for pain    Vulvodynia

## 2018-01-15 NOTE — PROGRESS NOTES
SUBJECTIVE:   Jena Pinedo is a 37 year old female who presents to clinic today for the following health issues:      RESPIRATORY SYMPTOMS      Duration: 3 days    Description  sore throat, cough, fever, chills, ear pain bilateral, headache and fatigue/malaise    Severity: moderate    Accompanying signs and symptoms: None    History (predisposing factors):  strep exposure and Flu    Precipitating or alleviating factors: None    Therapies tried and outcome:  oral decongestant antihistamine acetaminophen      Problem list and histories reviewed & adjusted, as indicated.  Additional history: as documented    Patient Active Problem List   Diagnosis     CARDIOVASCULAR SCREENING; LDL GOAL LESS THAN 160     Tobacco abuse     Allergic rhinitis     Chronic neck pain     Migraines     Recurrent low back pain     Mild recurrent major depression (H)     Anxiety disorder     Depression     Pain in pelvis     Past Surgical History:   Procedure Laterality Date     OPEN REDUCTION INTERNAL FIXATION ANKLE  1/2006    LT       Social History   Substance Use Topics     Smoking status: Former Smoker     Packs/day: 0.10     Years: 13.00     Types: Cigarettes     Smokeless tobacco: Never Used      Comment: 1 pack per week      Alcohol use Yes     Family History   Problem Relation Age of Onset     Hypertension Mother      Thyroid Disease Mother      Neurologic Disorder Mother      migraine     Hypertension Maternal Grandmother      C.A.D. Maternal Grandfather      MI     CANCER Paternal Grandmother      bone     Asthma Sister      DIABETES No family hx of              Reviewed and updated as needed this visit by clinical staff     Reviewed and updated as needed this visit by Provider         ROS:  Constitutional, HEENT, cardiovascular, pulmonary, GI, , musculoskeletal, neuro, skin, endocrine and psych systems are negative, except as otherwise noted.      OBJECTIVE:   /65  Pulse 67  Temp 97.8  F (36.6  C) (Oral)  Resp  15  Wt 194 lb (88 kg)  SpO2 98%  Breastfeeding? No  BMI 31.31 kg/m2  Body mass index is 31.31 kg/(m^2).  GENERAL: healthy, alert and no distress  EYES: Eyes grossly normal to inspection, PERRL and conjunctivae and sclerae normal  HENT: ear canals and TM's normal, nose and mouth without ulcers or lesions  NECK: no adenopathy, no asymmetry, masses, or scars and thyroid normal to palpation  RESP: lungs clear to auscultation - no rales, rhonchi or wheezes  CV: regular rate and rhythm, normal S1 S2, no S3 or S4, no murmur, click or rub, no peripheral edema and peripheral pulses strong  SKIN: no suspicious lesions or rashes    Diagnostic Test Results:  Results for orders placed or performed in visit on 01/15/18 (from the past 24 hour(s))   Strep, Rapid Screen   Result Value Ref Range    Specimen Description Throat     Rapid Strep A Screen       NEGATIVE: No Group A streptococcal antigen detected by immunoassay, await culture report.   Influenza A/B antigen   Result Value Ref Range    Influenza A/B Agn Specimen Nasal     Influenza A Negative NEG^Negative    Influenza B Negative NEG^Negative       ASSESSMENT/PLAN:     1. Sorethroat  Influenza and rapid strep negative, culture pending will notify  Home care advised  Monitor symptoms, call or rtc if worsening or not improving    See Patient Instructions    ZEYAD [facility]

## 2018-01-16 LAB
BACTERIA SPEC CULT: NORMAL
SPECIMEN SOURCE: NORMAL

## 2018-02-20 NOTE — PROGRESS NOTES
SUBJECTIVE:   Jena Pinedo is a 37 year old female who presents to clinic today for the following health issues:      Depression and Anxiety Follow-Up    Status since last visit: No change    Other associated symptoms:Down, worthless, little interest    Complicating factors:     Significant life event: Yes-  Broke up with significant other of 12 years and living in sisters basement and having to move soon due to sister having baby     Current substance abuse: None    PHQ-9 5/19/2016 1/19/2017 7/28/2017   Total Score 25 14 11   Q9: Suicide Ideation More than half the days Not at all Not at all     BONILLA-7 SCORE 12/10/2015 1/19/2017   Total Score 4 (minimal anxiety) -   Total Score - 19       PHQ-9  English  PHQ-9   Any Language  BONILLA-7  Suicide Assessment Five-step Evaluation and Treatment (SAFE-T)    Amount of exercise or physical activity: None outside of work but very active at work    Problems taking medications regularly: No    Medication side effects: none    Diet: regular (no restrictions)             Problem list and histories reviewed & adjusted, as indicated.  Additional history: as documented    Patient Active Problem List   Diagnosis     CARDIOVASCULAR SCREENING; LDL GOAL LESS THAN 160     Tobacco abuse     Allergic rhinitis     Chronic neck pain     Migraines     Recurrent low back pain     Mild recurrent major depression (H)     Anxiety disorder     Depression     Pain in pelvis     Past Surgical History:   Procedure Laterality Date     OPEN REDUCTION INTERNAL FIXATION ANKLE  1/2006    LT       Social History   Substance Use Topics     Smoking status: Former Smoker     Packs/day: 0.10     Years: 13.00     Types: Cigarettes     Smokeless tobacco: Never Used      Comment: 1 pack per week      Alcohol use Yes     Family History   Problem Relation Age of Onset     Hypertension Mother      Thyroid Disease Mother      Neurologic Disorder Mother      migraine     Hypertension Maternal Grandmother       FREDDIE Maternal Grandfather      MI     CANCER Paternal Grandmother      bone     Asthma Sister      DIABETES No family hx of          Current Outpatient Prescriptions   Medication Sig Dispense Refill     amitriptyline (ELAVIL) 50 MG tablet TAKE ONE OR TWO TABLETS BY MOUTH AT BEDTIME 180 tablet 3     FLUoxetine (PROZAC) 20 MG capsule Take 1 capsule (20 mg) by mouth daily 90 capsule 4     levonorgestrel (MIRENA) 20 MCG/24HR IUD 1 each by Intrauterine route once.       [DISCONTINUED] amitriptyline (ELAVIL) 50 MG tablet TAKE ONE OR TWO TABLETS BY MOUTH AT BEDTIME 130 tablet 0     [DISCONTINUED] FLUoxetine (PROZAC) 20 MG capsule Take 1 capsule (20 mg) by mouth daily 90 capsule 4     Allergies   Allergen Reactions     Nicotine      Patch, arm swelling, itchy, red     BP Readings from Last 3 Encounters:   02/22/18 114/70   01/15/18 112/65   04/05/17 98/62    Wt Readings from Last 3 Encounters:   02/22/18 197 lb (89.4 kg)   01/15/18 194 lb (88 kg)   04/05/17 183 lb (83 kg)                  Labs reviewed in EPIC    Reviewed and updated as needed this visit by clinical staff       Reviewed and updated as needed this visit by Provider         ROS:  This 37 year old female is here today to get her meds refiled. She is very pleased with prozac for her depression and amitriptyline for her insomnia and migraines. She is a single mom to 2 sons ages 16 and 7. Works full time. Fathers of the boys do not help with child support. She quit smoking 1 year ago and is doing well. Overall, feels good about herself. 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 Her anxiety is better since she and her boyfriend  over 1 year ago. He was not good for their relationship.  All other review of systems are negative  Personal, family, and social history reviewed with patient and revised.         OBJECTIVE:     /70  Pulse 86  Temp 97.2  F (36.2  C) (Oral)  Resp 16  Wt 197 lb (89.4 kg)  SpO2 100%  BMI 31.8  kg/m2  Body mass index is 31.8 kg/(m^2).  GENERAL: healthy, alert and no distress  NECK: no adenopathy, no asymmetry, masses, or scars and thyroid normal to palpation  RESP: lungs clear to auscultation - no rales, rhonchi or wheezes  CV: regular rate and rhythm, normal S1 S2, no S3 or S4, no murmur, click or rub, no peripheral edema and peripheral pulses strong  MS: no gross musculoskeletal defects noted, no edema    Diagnostic Test Results:  none     ASSESSMENT/PLAN:              1. Mild recurrent major depression (H)  Good control   - amitriptyline (ELAVIL) 50 MG tablet; TAKE ONE OR TWO TABLETS BY MOUTH AT BEDTIME  Dispense: 180 tablet; Refill: 3  - FLUoxetine (PROZAC) 20 MG capsule; Take 1 capsule (20 mg) by mouth daily  Dispense: 90 capsule; Refill: 4    2. Anxiety disorder, unspecified type  Good control   - FLUoxetine (PROZAC) 20 MG capsule; Take 1 capsule (20 mg) by mouth daily  Dispense: 90 capsule; Refill: 4    3. Migraine without status migrainosus, not intractable, unspecified migraine type  Good control   - amitriptyline (ELAVIL) 50 MG tablet; TAKE ONE OR TWO TABLETS BY MOUTH AT BEDTIME  Dispense: 180 tablet; Refill: 3    4. Need for Tdap vaccination  due  - TDAP VACCINE (ADACEL)    Return to clinic 1 year     FLAQUITO VERDUGO MD  [facility]

## 2018-02-22 ENCOUNTER — OFFICE VISIT (OUTPATIENT)
Dept: FAMILY MEDICINE | Facility: CLINIC | Age: 38
End: 2018-02-22
Payer: COMMERCIAL

## 2018-02-22 VITALS
TEMPERATURE: 97.2 F | OXYGEN SATURATION: 100 % | RESPIRATION RATE: 16 BRPM | HEART RATE: 86 BPM | DIASTOLIC BLOOD PRESSURE: 70 MMHG | SYSTOLIC BLOOD PRESSURE: 114 MMHG | WEIGHT: 197 LBS | BODY MASS INDEX: 31.8 KG/M2

## 2018-02-22 DIAGNOSIS — G43.909 MIGRAINE WITHOUT STATUS MIGRAINOSUS, NOT INTRACTABLE, UNSPECIFIED MIGRAINE TYPE: ICD-10-CM

## 2018-02-22 DIAGNOSIS — F33.0 MILD RECURRENT MAJOR DEPRESSION (H): Primary | ICD-10-CM

## 2018-02-22 DIAGNOSIS — F41.9 ANXIETY DISORDER, UNSPECIFIED TYPE: ICD-10-CM

## 2018-02-22 DIAGNOSIS — Z23 NEED FOR TDAP VACCINATION: ICD-10-CM

## 2018-02-22 PROCEDURE — 99213 OFFICE O/P EST LOW 20 MIN: CPT | Performed by: FAMILY MEDICINE

## 2018-02-22 PROCEDURE — 90715 TDAP VACCINE 7 YRS/> IM: CPT | Performed by: FAMILY MEDICINE

## 2018-02-22 RX ORDER — AMITRIPTYLINE HYDROCHLORIDE 50 MG/1
TABLET ORAL
Qty: 180 TABLET | Refills: 3 | Status: SHIPPED | OUTPATIENT
Start: 2018-02-22 | End: 2021-03-31

## 2018-02-22 ASSESSMENT — ANXIETY QUESTIONNAIRES
5. BEING SO RESTLESS THAT IT IS HARD TO SIT STILL: MORE THAN HALF THE DAYS
1. FEELING NERVOUS, ANXIOUS, OR ON EDGE: NOT AT ALL
IF YOU CHECKED OFF ANY PROBLEMS ON THIS QUESTIONNAIRE, HOW DIFFICULT HAVE THESE PROBLEMS MADE IT FOR YOU TO DO YOUR WORK, TAKE CARE OF THINGS AT HOME, OR GET ALONG WITH OTHER PEOPLE: VERY DIFFICULT
2. NOT BEING ABLE TO STOP OR CONTROL WORRYING: SEVERAL DAYS
7. FEELING AFRAID AS IF SOMETHING AWFUL MIGHT HAPPEN: NEARLY EVERY DAY
6. BECOMING EASILY ANNOYED OR IRRITABLE: MORE THAN HALF THE DAYS
3. WORRYING TOO MUCH ABOUT DIFFERENT THINGS: SEVERAL DAYS
GAD7 TOTAL SCORE: 11

## 2018-02-22 ASSESSMENT — PATIENT HEALTH QUESTIONNAIRE - PHQ9: 5. POOR APPETITE OR OVEREATING: MORE THAN HALF THE DAYS

## 2018-02-22 NOTE — LETTER
My Depression Action Plan  Name: Jena Pinedo   Date of Birth 1980  Date: 2/22/2018    My doctor: Krysten Duncan   My clinic: [address]  [place] [address]  [phone]          GREEN    ZONE   Good Control    What it looks like:     Things are going generally well. You have normal up s and down s. You may even feel depressed from time to time, but bad moods usually last less than a day.   What you need to do:  1. Continue to care for yourself (see self care plan)  2. Check your depression survival kit and update it as needed  3. Follow your physician s recommendations including any medication.  4. Do not stop taking medication unless you consult with your physician first.           YELLOW         ZONE Getting Worse    What it looks like:     Depression is starting to interfere with your life.     It may be hard to get out of bed; you may be starting to isolate yourself from others.    Symptoms of depression are starting to last most all day and this has happened for several days.     You may have suicidal thoughts but they are not constant.   What you need to do:     1. Call your care team, your response to treatment will improve if you keep your care team informed of your progress. Yellow periods are signs an adjustment may need to be made.     2. Continue your self-care, even if you have to fake it!    3. Talk to someone in your support network    4. Open up your depression survival kit           RED    ZONE Medical Alert - Get Help    What it looks like:     Depression is seriously interfering with your life.     You may experience these or other symptoms: You can t get out of bed most days, can t work or engage in other necessary activities, you have trouble taking care of basic hygiene, or basic responsibilities, thoughts of suicide or death that will not go away, self-injurious behavior.     What you need to do:  1. Call your care team  and request a same-day appointment. If they are not available (weekends or after hours) call your local crisis line, emergency room or 911.      Electronically signed by: FLAQUITO VERDUGO, February 22, 2018    Depression Self Care Plan / Survival Kit    Self-Care for Depression  Here s the deal. Your body and mind are really not as separate as most people think.  What you do and think affects how you feel and how you feel influences what you do and think. This means if you do things that people who feel good do, it will help you feel better.  Sometimes this is all it takes.  There is also a place for medication and therapy depending on how severe your depression is, so be sure to consult with your medical provider and/ or Behavioral Health Consultant if your symptoms are worsening or not improving.     In order to better manage my stress, I will:    Exercise  Get some form of exercise, every day. This will help reduce pain and release endorphins, the  feel good  chemicals in your brain. This is almost as good as taking antidepressants!  This is not the same as joining a gym and then never going! (they count on that by the way ) It can be as simple as just going for a walk or doing some gardening, anything that will get you moving.      Hygiene   Maintain good hygiene (Get out of bed in the morning, Make your bed, Brush your teeth, Take a shower, and Get dressed like you were going to work, even if you are unemployed).  If your clothes don't fit try to get ones that do.    Diet  I will strive to eat foods that are good for me, drink plenty of water, and avoid excessive sugar, caffeine, alcohol, and other mood-altering substances.  Some foods that are helpful in depression are: complex carbohydrates, B vitamins, flaxseed, fish or fish oil, fresh fruits and vegetables.    Psychotherapy  I agree to participate in Individual Therapy (if recommended).    Medication  If prescribed medications, I agree to take them.   Missing doses can result in serious side effects.  I understand that drinking alcohol, or other illicit drug use, may cause potential side effects.  I will not stop my medication abruptly without first discussing it with my provider.    Staying Connected With Others  I will stay in touch with my friends, family members, and my primary care provider/team.    Use your imagination  Be creative.  We all have a creative side; it doesn t matter if it s oil painting, sand castles, or mud pies! This will also kick up the endorphins.    Witness Beauty  (AKA stop and smell the roses) Take a look outside, even in mid-winter. Notice colors, textures. Watch the squirrels and birds.     Service to others  Be of service to others.  There is always someone else in need.  By helping others we can  get out of ourselves  and remember the really important things.  This also provides opportunities for practicing all the other parts of the program.    Humor  Laugh and be silly!  Adjust your TV habits for less news and crime-drama and more comedy.    Control your stress  Try breathing deep, massage therapy, biofeedback, and meditation. Find time to relax each day.     My support system    Clinic Contact:  Phone number:    Contact 1:  Phone number:    Contact 2:  Phone number:    [employer]/:  Phone number:    Therapist:  Phone number:    Local crisis center:    Phone number:    Other community support:  Phone number:

## 2018-02-22 NOTE — MR AVS SNAPSHOT
After Visit Summary   2/22/2018    Jena Pinedo    MRN: [id number]           Patient Information     Date Of Birth          1980        Visit Information        Provider Department      2/22/2018 4:00 PM Krysten Duncan MD [facility]        Today's Diagnoses     Mild recurrent major depression (H)    -  1    Anxiety disorder, unspecified type        Migraine without status migrainosus, not intractable, unspecified migraine type        Need for Tdap vaccination          Care Instructions    [facility]    If you have any questions regarding to your visit please contact your care team:       Team Purple:   Clinic Hours Telephone Number   Dr. Krysten Tejada   7am-7pm  Monday - Thursday   7am-5pm  Fridays  [phone]  (Appointment scheduling available 24/7)    Questions about your Visit?   Team Line:  [phone]   [facility] [place] [facility]am-9pm Monday-Friday Saturday-Sunday- 9am-5pm   [place] - 5pm-9pm Monday-Friday Saturday-Sunday- 9am-5pm  [phone] - [place]   [phone] - [place]       What options do I have for visits at the clinic other than the traditional office visit?  To expand how we care for you, many of our providers are utilizing electronic visits (e-visits) and telephone visits, when medically appropriate, for interactions with their patients rather than a visit in the clinic.   We also offer nurse visits for many medical concerns. Just like any other service, we will bill your insurance company for this type of visit based on time spent on the phone with your provider. Not all insurance companies cover these visits. Please check with your medical insurance if this type of visit is covered. You will be responsible for any charges that are not paid by your insurance.      E-visits via SimplyTapp:  generally incur a $35.00 fee.  Telephone  visits:  Time spent on the phone: *charged based on time that is spent on the phone in increments of 10 minutes. Estimated cost:   5-10 mins $30.00   11-20 mins. $59.00   21-30 mins. $85.00     Use Event Innovationt (secure email communication and access to your chart) to send your primary care provider a message or make an appointment. Ask someone on your Team how to sign up for Event Innovationt.  For a Price Quote for your services, please call our [employer] Line at [phone].  As always, Thank you for trusting us with your health care needs!              Follow-ups after your visit        Who to contact     If you have questions or need follow up information about today's clinic visit or your schedule please contact [facility] directly at [phone].  Normal or non-critical lab and imaging results will be communicated to you by Okeohart, letter or phone within 4 business days after the clinic has received the results. If you do not hear from us within 7 days, please contact the clinic through Event Innovationt or phone. If you have a critical or abnormal lab result, we will notify you by phone as soon as possible.  Submit refill requests through Luxera or call your pharmacy and they will forward the refill request to us. Please allow 3 business days for your refill to be completed.          Additional Information About Your Visit        Luxera Information     Luxera gives you secure access to your electronic health record. If you see a primary care provider, you can also send messages to your care team and make appointments. If you have questions, please call your primary care clinic.  If you do not have a primary care provider, please call [phone] and they will assist you.        Care EveryWhere ID     This is your Care EveryWhere ID. This could be used by other organizations to access your [place] medical records  [id number]        Your Vitals Were     Pulse Temperature Respirations Pulse Oximetry BMI  (Body Mass Index)       86 97.2  F (36.2  C) (Oral) 16 100% 31.8 kg/m2        Blood Pressure from Last 3 Encounters:   02/22/18 114/70   01/15/18 112/65   04/05/17 98/62    Weight from Last 3 Encounters:   02/22/18 197 lb (89.4 kg)   01/15/18 194 lb (88 kg)   04/05/17 183 lb (83 kg)              We Performed the Following     TDAP VACCINE (ADACEL)          Today's Medication Changes          These changes are accurate as of 2/22/18  4:36 PM.  If you have any questions, ask your nurse or doctor.               These medicines have changed or have updated prescriptions.        Dose/Directions    amitriptyline 50 MG tablet   Commonly known as:  ELAVIL   This may have changed:  See the new instructions.   Used for:  Mild recurrent major depression (H), Migraine without status migrainosus, not intractable, unspecified migraine type   Changed by:  Krysten Duncan MD        TAKE ONE OR TWO TABLETS BY MOUTH AT BEDTIME   Quantity:  180 tablet   Refills:  3            Where to get your medicines      [facility] [address] [place] [id number] [facility]  [address] [facility] [address]    Hours:  24-hours Phone:  [phone]     amitriptyline 50 MG tablet    FLUoxetine 20 MG capsule                Primary Care Provider Office Phone # Fax #    Krysten Duncan [phone]       [address] [address]        Equal Access to Services     [facility]: Hadii maria del carmen malave hadasho Soobi, waaxda luqadaha, qaybta kaalmada adeegjoan, karley rosario . So [facility] [phone].    ATENCIÓN: Si habla español, tiene a gonzalez disposición servicios gratuitos de asistencia lingüística. Llame al [phone].    We comply with applicable federal civil rights laws and [place] laws. We do not discriminate on the basis of race, color, national origin, age,  disability, sex, sexual orientation, or gender identity.            Thank you!     Thank you for choosing [facility]  for your care. Our goal is always to provide you with excellent care. Hearing back from our patients is one way we can continue to improve our services. Please take a few minutes to complete the written survey that you may receive in the mail after your visit with us. Thank you!             Your Updated Medication List - Protect others around you: Learn how to safely use, store and throw away your medicines at www.disposemymeds.org.          This list is accurate as of 2/22/18  4:36 PM.  Always use your most recent med list.                   Brand Name Dispense Instructions for use Diagnosis    amitriptyline 50 MG tablet    ELAVIL    180 tablet    TAKE ONE OR TWO TABLETS BY MOUTH AT BEDTIME    Mild recurrent major depression (H), Migraine without status migrainosus, not intractable, unspecified migraine type       FLUoxetine 20 MG capsule    PROzac    90 capsule    Take 1 capsule (20 mg) by mouth daily    Mild recurrent major depression (H)       MIRENA (52 MG) 20 MCG/24HR IUD   Generic drug:  levonorgestrel      1 each by Intrauterine route once.

## 2018-02-22 NOTE — PATIENT INSTRUCTIONS
[facility]    If you have any questions regarding to your visit please contact your care team:       Team Purple:   Clinic Hours Telephone Number   Dr. Krysten Tejada   7am-7pm  Monday - Thursday   7am-5pm  Fridays  [phone]  (Appointment scheduling available 24/7)    Questions about your Visit?   Team Line:  [phone]   Urgent Care - [place] [facility] - 11am-9pm Monday-Friday Saturday-Sunday- 9am-5pm   [place] - 5pm-9pm Monday-Friday Saturday-Sunday- 9am-5pm  [phone] - [facility]  [phone] - [place]       What options do I have for visits at the clinic other than the traditional office visit?  To expand how we care for you, many of our providers are utilizing electronic visits (e-visits) and telephone visits, when medically appropriate, for interactions with their patients rather than a visit in the clinic.   We also offer nurse visits for many medical concerns. Just like any other service, we will bill your insurance company for this type of visit based on time spent on the phone with your provider. Not all insurance companies cover these visits. Please check with your medical insurance if this type of visit is covered. You will be responsible for any charges that are not paid by your insurance.      E-visits via Studio Whale:  generally incur a $35.00 fee.  Telephone visits:  Time spent on the phone: *charged based on time that is spent on the phone in increments of 10 minutes. Estimated cost:   5-10 mins $30.00   11-20 mins. $59.00   21-30 mins. $85.00     Use Paperwovenhart (secure email communication and access to your chart) to send your primary care provider a message or make an appointment. Ask someone on your Team how to sign up for [employer].  For a Price Quote for your services, please call our Consumer Price Line at [phone].  As always, Thank you for trusting us with your health care needs!

## 2018-02-23 ASSESSMENT — ANXIETY QUESTIONNAIRES: GAD7 TOTAL SCORE: 11

## 2018-02-23 ASSESSMENT — PATIENT HEALTH QUESTIONNAIRE - PHQ9: SUM OF ALL RESPONSES TO PHQ QUESTIONS 1-9: 14

## 2018-02-23 NOTE — NURSING NOTE
Screening Questionnaire for Adult Immunization    Are you sick today?   No   Do you have allergies to medications, food, a vaccine component or latex?   No   Have you ever had a serious reaction after receiving a vaccination?   No   Do you have a long-term health problem with heart disease, lung disease, asthma, kidney disease, metabolic disease (e.g. diabetes), anemia, or other blood disorder?   No   Do you have cancer, leukemia, HIV/AIDS, or any other immune system problem?   No   In the past 3 months, have you taken medications that affect  your immune system, such as prednisone, other steroids, or anticancer drugs; drugs for the treatment of rheumatoid arthritis, Crohn s disease, or psoriasis; or have you had radiation treatments?   No   Have you had a seizure, or a brain or other nervous system problem?   No   During the past year, have you received a transfusion of blood or blood     products, or been given immune (gamma) globulin or antiviral drug?   No   For women: Are you pregnant or is there a chance you could become        pregnant during the next month?   No   Have you received any vaccinations in the past 4 weeks?   No     Immunization questionnaire answers were all negative.        Per orders of Dr. Duncan, injection of TDAP given by Erin Joy. Patient instructed to remain in clinic for 15 minutes afterwards, and to report any adverse reaction to me immediately.       Screening performed by Erin Joy on 2/22/2018 at 6:48 PM.

## 2018-07-17 ENCOUNTER — TELEPHONE (OUTPATIENT)
Dept: FAMILY MEDICINE | Facility: CLINIC | Age: 38
End: 2018-07-17

## 2018-07-17 NOTE — TELEPHONE ENCOUNTER
Panel Management Review      Patient has the following on her problem list:     Depression / Dysthymia review    Measure:  Needs PHQ-9 score of 4 or less during index window.  Administer PHQ-9 and if score is 5 or more, send encounter to provider for next steps.    5 - 7 month window range: INDEX DATE:02/22/2018, INDEX PHQ9: 14, FU START DATE:06/22/2018, FU END DATE: 10/22/2018    PHQ-9 SCORE 1/19/2017 7/28/2017 2/22/2018   Total Score - - -   Total Score 14 11 14       If PHQ-9 recheck is 5 or more, route to provider for next steps.    Patient is due for:  FAILED PHQ9      Composite cancer screening  Chart review shows that this patient is due/due soon for the following None  Summary:    Patient is due/failing the following:   PHQ9    Action needed:   Patient needs to do PHQ9.    Type of outreach:    Sent Ajalinet message.    Questions for provider review:    None                                                                                                                                    Petra Johns MA       Chart routed to Care Team .

## 2018-10-10 ENCOUNTER — OFFICE VISIT (OUTPATIENT)
Dept: FAMILY MEDICINE | Facility: CLINIC | Age: 38
End: 2018-10-10
Payer: COMMERCIAL

## 2018-10-10 VITALS
SYSTOLIC BLOOD PRESSURE: 110 MMHG | OXYGEN SATURATION: 99 % | TEMPERATURE: 98.5 F | WEIGHT: 202.4 LBS | BODY MASS INDEX: 32.67 KG/M2 | DIASTOLIC BLOOD PRESSURE: 72 MMHG | HEART RATE: 74 BPM

## 2018-10-10 DIAGNOSIS — R20.2 TINGLING OF BOTH FEET: ICD-10-CM

## 2018-10-10 DIAGNOSIS — F41.8 DEPRESSION WITH ANXIETY: ICD-10-CM

## 2018-10-10 DIAGNOSIS — I73.00 RAYNAUD'S DISEASE WITHOUT GANGRENE: ICD-10-CM

## 2018-10-10 DIAGNOSIS — M43.6 TORTICOLLIS, ACUTE: Primary | ICD-10-CM

## 2018-10-10 LAB
CRP SERPL-MCNC: <2.9 MG/L (ref 0–8)
ERYTHROCYTE [SEDIMENTATION RATE] IN BLOOD BY WESTERGREN METHOD: 9 MM/H (ref 0–20)
FOLATE SERPL-MCNC: 13.7 NG/ML
HBA1C MFR BLD: 5 % (ref 0–5.6)
VIT B12 SERPL-MCNC: 429 PG/ML (ref 193–986)

## 2018-10-10 PROCEDURE — 86038 ANTINUCLEAR ANTIBODIES: CPT | Performed by: FAMILY MEDICINE

## 2018-10-10 PROCEDURE — 83036 HEMOGLOBIN GLYCOSYLATED A1C: CPT | Performed by: FAMILY MEDICINE

## 2018-10-10 PROCEDURE — 99214 OFFICE O/P EST MOD 30 MIN: CPT | Performed by: FAMILY MEDICINE

## 2018-10-10 PROCEDURE — 36415 COLL VENOUS BLD VENIPUNCTURE: CPT | Performed by: FAMILY MEDICINE

## 2018-10-10 PROCEDURE — 85652 RBC SED RATE AUTOMATED: CPT | Performed by: FAMILY MEDICINE

## 2018-10-10 PROCEDURE — 82746 ASSAY OF FOLIC ACID SERUM: CPT | Performed by: FAMILY MEDICINE

## 2018-10-10 PROCEDURE — 82607 VITAMIN B-12: CPT | Performed by: FAMILY MEDICINE

## 2018-10-10 PROCEDURE — 86140 C-REACTIVE PROTEIN: CPT | Performed by: FAMILY MEDICINE

## 2018-10-10 PROCEDURE — 86431 RHEUMATOID FACTOR QUANT: CPT | Performed by: FAMILY MEDICINE

## 2018-10-10 RX ORDER — FLUOXETINE 40 MG/1
40 CAPSULE ORAL DAILY
Qty: 90 CAPSULE | Refills: 1 | Status: SHIPPED | OUTPATIENT
Start: 2018-10-10 | End: 2021-03-31

## 2018-10-10 RX ORDER — CYCLOBENZAPRINE HCL 5 MG
5 TABLET ORAL 3 TIMES DAILY PRN
Qty: 42 TABLET | Refills: 0 | Status: SHIPPED | OUTPATIENT
Start: 2018-10-10 | End: 2021-03-31

## 2018-10-10 ASSESSMENT — ANXIETY QUESTIONNAIRES
GAD7 TOTAL SCORE: 14
3. WORRYING TOO MUCH ABOUT DIFFERENT THINGS: MORE THAN HALF THE DAYS
6. BECOMING EASILY ANNOYED OR IRRITABLE: NEARLY EVERY DAY
5. BEING SO RESTLESS THAT IT IS HARD TO SIT STILL: SEVERAL DAYS
IF YOU CHECKED OFF ANY PROBLEMS ON THIS QUESTIONNAIRE, HOW DIFFICULT HAVE THESE PROBLEMS MADE IT FOR YOU TO DO YOUR WORK, TAKE CARE OF THINGS AT HOME, OR GET ALONG WITH OTHER PEOPLE: VERY DIFFICULT
7. FEELING AFRAID AS IF SOMETHING AWFUL MIGHT HAPPEN: MORE THAN HALF THE DAYS
2. NOT BEING ABLE TO STOP OR CONTROL WORRYING: MORE THAN HALF THE DAYS
1. FEELING NERVOUS, ANXIOUS, OR ON EDGE: MORE THAN HALF THE DAYS

## 2018-10-10 ASSESSMENT — PATIENT HEALTH QUESTIONNAIRE - PHQ9: 5. POOR APPETITE OR OVEREATING: MORE THAN HALF THE DAYS

## 2018-10-10 NOTE — PATIENT INSTRUCTIONS
Torticollis (Wry Neck)  Torticollis happens when muscles on one side of the neck contract (tighten). This causes the neck to twist or tilt to the side. The muscles may also be quite sore. It affects mainly children and young adults, often appearing overnight. It can also affect infants who develop or are born with tight neck muscles on one side.  What causes torticollis?  Causes of torticollis include:    Congenital (present at birth). Injury to the neck muscles from an accident or other trauma, or even just sleeping in an unusual position    Side effect of certain medicines or drugs    Problems with the bones of the neck (which can happen after an infection or injury)    Spasm of the muscles due to an infection, such as an abscess in the neck  When to go to the emergency room (ER)  All neck problems should be checked by a healthcare provider within 24 hours. Seek emergency care if you can't reach your healthcare provider or these symptoms are present:    Trouble breathing or swallowing or in smaller children, continuous drooling    Numbness or weakness in the arms and legs    Trouble walking or speaking    Fever  What to expect in the ER  The neck will be examined, and questions about any current or former medical problems will be asked. X-rays of the neck may be taken to check for broken bones.  Treatment  The goal in treating torticollis is to relax the neck muscles. The best approach will depend on the cause of the problem. In most cases, one or more of the following may be given:    Medicines to help relax the muscles and reduce swelling    Hot and cold compresses to help ease muscle tightness    Botulinum toxin injections to prevent further muscle spasms    Physical therapy to help stretch and relax the muscles    Treatment of any infection, which may need intravenous antibiotics or surgery  Follow-up  Depending on the cause, torticollis often goes away on its own. Follow up with your healthcare provider as  instructed. If symptoms become worse, call your healthcare provider or return to the ER.  Date Last Reviewed: 9/30/2015 2000-2017 [employer]. [address] [place] [address], [place] [address]. All rights reserved. This information is not intended as a substitute for professional medical care. Always follow your healthcare professional's instructions.        Raynaud Disease  Your healthcare provider has told you that you have Raynaud disease. It is also called Raynaud phenomenon or Raynaud syndrome. There is no cure for Raynaud disease, but you can manage it to help prevent attacks.    What are the symptoms of Raynaud disease?  A Raynaud disease attack is often triggered by cold or stress. During an attack, blood vessels suddenly narrow (called vasospasm).  This most often happens in fingers and toes. In rare cases, the nose, ears, or even tongue are affected. Narrowed blood vessels reduce the blood supply to the area. The area then turns white, then blue. The area may feel numb or painful. As the attack passes, the blood vessels open. The affected area may turn bright red as it warms up, then returns to normal color.  What is the cause of Raynaud disease?  With Raynaud disease, it is believed that blood vessels in the affected areas overrespond to certain triggers, such as cold. This makes them narrow (called vasospasm) much more than in people without the disease. What causes the blood vessels to react so strongly to certain triggers is unknown. In between attacks, the blood vessels are normal and healthy. Attacks don t permanently damage the blood vessels, but may thicken the artery walls.   In some cases, Raynaud disease happens along with another disease or condition. This is often a connective tissue disorder, such as lupus, scleroderma, or rheumatoid arthritis. This is called secondary Raynaud disease (as opposed to primary Raynaud disease discussed above) and may be more severe. If this is the case  for you, you and your healthcare provider can discuss treatment for the underlying condition.  What are the risk factors?  Risk factors for Raynaud disease include:    Women are more likely to get Raynaud disease than men.    Younger individuals are at higher risk, usually ages 15 to 30.    Living in colder climates increases risk.    Having a family member with Raynaud disease increases one's risk.    Underlying rheumatoid conditions may increase one's risk.   What are possible triggers?  Triggers for Raynaud disease include:     Cold    Stress    Caffeine    Smoking    Repetitive movements    Certain medicines, such as beta-blockers, migraine medicine, birth control pills and others    Injury  How is Raynaud disease diagnosed?  Your description of your symptoms, a health history, and a physical exam are often enough for a diagnosis. Blood tests and other tests may be done to see if any underlying conditions are present and rule out other problems.  How is Raynaud disease treated?  There is no cure for Raynaud disease. But you can control symptoms and reduce the number and severity of attacks. For most people, avoiding triggers is enough to limit attacks. Your healthcare provider may suggest the following:    Take precautions to help prevent your hands and feet from losing circulation. This includes:  ? Dressing warmly in cold weather.  ? Wear gloves or mittens when your hands may become cold, such as when you use the refrigerator or freezer.  ? Avoid stress and caffeine.  ? Exercise regularly. This may reduce the number and severity of attacks.   ? If you smoke, quitting may improve the condition. This is because smoking causes your blood vessels to narrow and reduces blood flow.    Soak your hands or feet in warm (not hot) water. Do this at the first sign of attack. Keep soaking until your skin color returns to normal.  In some people, symptoms are persistent or troubling. For these cases, other treatments are a  choice. Your healthcare provider can tell you more about the following:    Prescription medicines that relax and widen blood vessels, such as calcium channel blockers. These may help relieve symptoms.    Nerve surgery for severe cases that don t respond to other treatments. Surgery removes the nerves that surround the blood vessels in the hands and feet. Without nerve stimulation, the blood vessels stay more relaxed. They are less likely to become very narrow due to stimulus. Nerves may be blocked using injections in some cases.  Most cases of Raynaud disease are not cause for concern. The disease doesn t get worse and isn t likely to cause any permanent damage. If attacks are severe, very prolonged, or very often, skin damage may result. Controlling attacks can help prevent this.  When to seek medical care  The following problems happen rarely, but they can be serious. Call your healthcare provider right away if you notice any of the following:    Infection or sores on the skin    A finger or toe turns black    The skin breaks open on its own    A rash develops    A finger or toe joint becomes painful or swollen   Date Last Reviewed: 1/27/2016 2000-2017 [employer]. [address] [place] [address], [place] [address]. All rights reserved. This information is not intended as a substitute for professional medical care. Always follow your healthcare professional's instructions.

## 2018-10-10 NOTE — PROGRESS NOTES
SUBJECTIVE:   Jena Pinedo is a 37 year old female who presents to clinic today for the following health issues:      Foot Problem   Reporting burning sensation with some tingling in bottoms of feet over the past x1 month. Symptoms have not improved or worsened.  No rash, or swelling.     Neck Pain x4 days  Tightness in right side of neck, no trauma she is aware of.   Cannot fully turn head to the right       Medication Followup of Depression with anxiety    Taking Medication as prescribed: yes    Side Effects:  None    Medication Helping Symptoms:  Yes but feels like there is room for improvement.  PHQ-9 ([employer]) 10/10/2018   1.  Little interest or pleasure in doing things 1   2.  Feeling down, depressed, or hopeless 1   3.  Trouble falling or staying asleep, or sleeping too much 1   4.  Feeling tired or having little energy 1   5.  Poor appetite or overeating 1   6.  Feeling bad about yourself 2   7.  Trouble concentrating 2   8.  Moving slowly or restless 0   9.  Suicidal or self-harm thoughts 0   PHQ-9 Total Score 9   Difficulty at work, home, or with people Somewhat difficult          BONILLA-7   [employer], 2002; Used with Permission) 10/10/2018   1. Feeling nervous, anxious, or on edge 2   2. Not being able to stop or control worrying 2   3. Worrying too much about different things 2   4. Trouble relaxing 2   5. Being so restless that it is hard to sit still 1   6. Becoming easily annoyed or irritable 3   7. Feeling afraid, as if something awful might happen 2   BONILLA-7 Total Score 14   If you checked any problems, how difficult have they made it for you to do your work, take care of things at home, or get along with other people? Very difficult         Problem list and histories reviewed & adjusted, as indicated.  Additional history: as documented    Patient Active Problem List   Diagnosis     CARDIOVASCULAR SCREENING; LDL GOAL LESS THAN 160     Tobacco abuse     Allergic rhinitis     Chronic neck pain      Migraines     Recurrent low back pain     Mild recurrent major depression (H)     Anxiety disorder     Depression     Pain in pelvis     Past Surgical History:   Procedure Laterality Date     OPEN REDUCTION INTERNAL FIXATION ANKLE  1/2006    LT       Social History   Substance Use Topics     Smoking status: Former Smoker     Packs/day: 0.10     Years: 13.00     Types: Cigarettes     Smokeless tobacco: Never Used      Comment: 1 pack per week      Alcohol use Yes     Family History   Problem Relation Age of Onset     Hypertension Mother      Thyroid Disease Mother      Neurologic Disorder Mother      migraine     Hypertension Maternal Grandmother      C.A.D. Maternal Grandfather      MI     Cancer Paternal Grandmother      bone     Asthma Sister      Diabetes No family hx of          Current Outpatient Prescriptions   Medication Sig Dispense Refill     amitriptyline (ELAVIL) 50 MG tablet TAKE ONE OR TWO TABLETS BY MOUTH AT BEDTIME 180 tablet 3     cyclobenzaprine (FLEXERIL) 5 MG tablet Take 1 tablet (5 mg) by mouth 3 times daily as needed for muscle spasms 42 tablet 0     FLUoxetine (PROZAC) 40 MG capsule Take 1 capsule (40 mg) by mouth daily 90 capsule 1     levonorgestrel (MIRENA) 20 MCG/24HR IUD 1 each by Intrauterine route once.       Allergies   Allergen Reactions     Nicotine      Patch, arm swelling, itchy, red       Reviewed and updated as needed this visit by clinical staff       Reviewed and updated as needed this visit by Provider         ROS:  All others are negative except as above.      OBJECTIVE:     /72  Pulse 74  Temp 98.5  F (36.9  C) (Tympanic)  Wt 202 lb 6.4 oz (91.8 kg)  SpO2 99%  Breastfeeding? No  BMI 32.67 kg/m2  Body mass index is 32.67 kg/(m^2).  GENERAL: healthy, alert and no distress  NECK: no adenopathy, no asymmetry, masses, or scars and thyroid normal to palpation. Decreased ROM of the neck due to discomfort with tenderness of the right sternocleidomastoid muscle. No  overlying skin changes.   RESP: lungs clear to auscultation - no rales, rhonchi or wheezes  CV: regular rate and rhythm, normal S1 S2, no S3 or S4, no murmur, click or rub, no peripheral edema and peripheral pulses strong  MS: no gross musculoskeletal defects noted, no edema. Bluish hue of the toes and fingers. Sensation is intact.  NEURO: Normal strength and tone, mentation intact and speech normal    Diagnostic Test Results:  Labs drawn and in process    ASSESSMENT/PLAN:   Jena was seen today for neck pain and foot pain.    Diagnoses and all orders for this visit:    Torticollis, acute  -     cyclobenzaprine (FLEXERIL) 5 MG tablet; Take 1 tablet (5 mg) by mouth 3 times daily as needed for muscle spasms    Tingling of both feet  -     Vitamin B12  -     Folate  -     Hemoglobin A1c  -     Rheumatoid factor  -     CRP inflammation  -     Erythrocyte sedimentation rate auto  -     Anti Nuclear Luli IgG by IFA with Reflex    Raynaud's disease without gangrene  -     Rheumatoid factor  -     CRP inflammation  -     Erythrocyte sedimentation rate auto  -     Anti Nuclear Luli IgG by IFA with Reflex    Depression with anxiety, uncontrolled       -      PHQ-9/BONILLA 7 completed, see above/Epic for details    -     Increase dose: FLUoxetine (PROZAC) 40 MG capsule; Take 1 capsule (40 mg) by mouth daily      Patient education and Handout given    Will notify patient with results.        Follow up in a month, sooner if needed.    Schedule a Physical Exam at earliest convenience.        The patient was in agreement with the plan today and had no questions or concerns prior to leaving the clinic.          Shameka Graves MD  [facility]

## 2018-10-10 NOTE — MR AVS SNAPSHOT
After Visit Summary   10/10/2018    Jena Pinedo    MRN: [id number]           Patient Information     Date Of Birth          1980        Visit Information        Provider Department      10/10/2018 3:00 PM [facility]ame[facility] Graves MD [facility]        Today's Diagnoses     Torticollis, acute    -  1    Tingling of both feet        Raynaud's disease without gangrene        Depression with anxiety          Care Instructions      Torticollis (Wry Neck)  Torticollis happens when muscles on one side of the neck contract (tighten). This causes the neck to twist or tilt to the side. The muscles may also be quite sore. It affects mainly children and young adults, often appearing overnight. It can also affect infants who develop or are born with tight neck muscles on one side.  What causes torticollis?  Causes of torticollis include:    Congenital (present at birth). Injury to the neck muscles from an accident or other trauma, or even just sleeping in an unusual position    Side effect of certain medicines or drugs    Problems with the bones of the neck (which can happen after an infection or injury)    Spasm of the muscles due to an infection, such as an abscess in the neck  When to go to the emergency room (ER)  All neck problems should be checked by a healthcare provider within 24 hours. Seek emergency care if you can't reach your healthcare provider or these symptoms are present:    Trouble breathing or swallowing or in smaller children, continuous drooling    Numbness or weakness in the arms and legs    Trouble walking or speaking    Fever  What to expect in the ER  The neck will be examined, and questions about any current or former medical problems will be asked. X-rays of the neck may be taken to check for broken bones.  Treatment  The goal in treating torticollis is to relax the neck muscles. The best approach will depend on the cause of the problem. In most cases, one or more of  the following may be given:    Medicines to help relax the muscles and reduce swelling    Hot and cold compresses to help ease muscle tightness    Botulinum toxin injections to prevent further muscle spasms    Physical therapy to help stretch and relax the muscles    Treatment of any infection, which may need intravenous antibiotics or surgery  Follow-up  Depending on the cause, torticollis often goes away on its own. Follow up with your healthcare provider as instructed. If symptoms become worse, call your healthcare provider or return to the ER.  Date Last Reviewed: 9/30/2015 2000-2017 [employer]. [address] [place] [id number]. All rights reserved. This information is not intended as a substitute for professional medical care. Always follow your healthcare professional's instructions.        Raynaud Disease  Your healthcare provider has told you that you have Raynaud disease. It is also called Raynaud phenomenon or Raynaud syndrome. There is no cure for Raynaud disease, but you can manage it to help prevent attacks.    What are the symptoms of Raynaud disease?  A Raynaud disease attack is often triggered by cold or stress. During an attack, blood vessels suddenly narrow (called vasospasm).  This most often happens in fingers and toes. In rare cases, the nose, ears, or even tongue are affected. Narrowed blood vessels reduce the blood supply to the area. The area then turns white, then blue. The area may feel numb or painful. As the attack passes, the blood vessels open. The affected area may turn bright red as it warms up, then returns to normal color.  What is the cause of Raynaud disease?  With Raynaud disease, it is believed that blood vessels in the affected areas overrespond to certain triggers, such as cold. This makes them narrow (called vasospasm) much more than in people without the disease. What causes the blood vessels to react so strongly to certain triggers is unknown. In  between attacks, the blood vessels are normal and healthy. Attacks don t permanently damage the blood vessels, but may thicken the artery walls.   In some cases, Raynaud disease happens along with another disease or condition. This is often a connective tissue disorder, such as lupus, scleroderma, or rheumatoid arthritis. This is called secondary Raynaud disease (as opposed to primary Raynaud disease discussed above) and may be more severe. If this is the case for you, you and your healthcare provider can discuss treatment for the underlying condition.  What are the risk factors?  Risk factors for Raynaud disease include:    Women are more likely to get Raynaud disease than men.    Younger individuals are at higher risk, usually ages 15 to 30.    Living in colder climates increases risk.    Having a family member with Raynaud disease increases one's risk.    Underlying rheumatoid conditions may increase one's risk.   What are possible triggers?  Triggers for Raynaud disease include:     Cold    Stress    Caffeine    Smoking    Repetitive movements    Certain medicines, such as beta-blockers, migraine medicine, birth control pills and others    Injury  How is Raynaud disease diagnosed?  Your description of your symptoms, a health history, and a physical exam are often enough for a diagnosis. Blood tests and other tests may be done to see if any underlying conditions are present and rule out other problems.  How is Raynaud disease treated?  There is no cure for Raynaud disease. But you can control symptoms and reduce the number and severity of attacks. For most people, avoiding triggers is enough to limit attacks. Your healthcare provider may suggest the following:    Take precautions to help prevent your hands and feet from losing circulation. This includes:  ? Dressing warmly in cold weather.  ? Wear gloves or mittens when your hands may become cold, such as when you use the refrigerator or freezer.  ? Avoid stress  and caffeine.  ? Exercise regularly. This may reduce the number and severity of attacks.   ? If you smoke, quitting may improve the condition. This is because smoking causes your blood vessels to narrow and reduces blood flow.    Soak your hands or feet in warm (not hot) water. Do this at the first sign of attack. Keep soaking until your skin color returns to normal.  In some people, symptoms are persistent or troubling. For these cases, other treatments are a choice. Your healthcare provider can tell you more about the following:    Prescription medicines that relax and widen blood vessels, such as calcium channel blockers. These may help relieve symptoms.    Nerve surgery for severe cases that don t respond to other treatments. Surgery removes the nerves that surround the blood vessels in the hands and feet. Without nerve stimulation, the blood vessels stay more relaxed. They are less likely to become very narrow due to stimulus. Nerves may be blocked using injections in some cases.  Most cases of Raynaud disease are not cause for concern. The disease doesn t get worse and isn t likely to cause any permanent damage. If attacks are severe, very prolonged, or very often, skin damage may result. Controlling attacks can help prevent this.  When to seek medical care  The following problems happen rarely, but they can be serious. Call your healthcare provider right away if you notice any of the following:    Infection or sores on the skin    A finger or toe turns black    The skin breaks open on its own    A rash develops    A finger or toe joint becomes painful or swollen   Date Last Reviewed: 1/27/2016 2000-2017 [employer]. [address] [place] [address], [place] [address]. All rights reserved. This information is not intended as a substitute for professional medical care. Always follow your healthcare professional's instructions.                Follow-ups after your visit        Follow-up notes from your  care team     Return in about 1 month (around 11/10/2018) for Follow up, Physical Exam at earliest convenience.      Who to contact     Normal or non-critical lab and imaging results will be communicated to you by [employer], letter or phone within 4 business days after the clinic has received the results. If you do not hear from us within 7 days, please contact the clinic through [employer] or phone. If you have a critical or abnormal lab result, we will notify you by phone as soon as possible.  Submit refill requests through [facility] or call your pharmacy and they will forward the refill request to us. Please allow 3 business days for your refill to be completed.          If you need to speak with a  for additional information , please call: [phone]             Additional Information About Your Visit        [facility] Information     [facility] gives you secure access to your electronic health record. If you see a primary care provider, you can also send messages to your care team and make appointments. If you have questions, please call your primary care clinic.  If you do not have a primary care provider, please call [phone] and they will assist you.        Care EveryWhere ID     This is your Care EveryWhere ID. This could be used by other organizations to access your [facility] medical records  [id number]        Your Vitals Were     Pulse Temperature Pulse Oximetry Breastfeeding? BMI (Body Mass Index)       74 98.5  F (36.9  C) (Tympanic) 99% No 32.67 kg/m2        Blood Pressure from Last 3 Encounters:   10/10/18 110/72   02/22/18 114/70   01/15/18 112/65    Weight from Last 3 Encounters:   10/10/18 202 lb 6.4 oz (91.8 kg)   02/22/18 197 lb (89.4 kg)   01/15/18 194 lb (88 kg)              We Performed the Following     Anti Nuclear Luli IgG by IFA with Reflex     CRP inflammation     Erythrocyte sedimentation rate auto     Folate     Hemoglobin A1c     Rheumatoid factor     Vitamin B12           Today's Medication Changes          These changes are accurate as of 10/10/18  3:40 PM.  If you have any questions, ask your nurse or doctor.               Start taking these medicines.        Dose/Directions    cyclobenzaprine 5 MG tablet   Commonly known as:  FLEXERIL   Used for:  Torticollis, acute   Started by:  Shameka Graves MD        Dose:  5 mg   Take 1 tablet (5 mg) by mouth 3 times daily as needed for muscle spasms   Quantity:  42 tablet   Refills:  0         These medicines have changed or have updated prescriptions.        Dose/Directions    FLUoxetine 40 MG capsule   Commonly known as:  PROzac   This may have changed:    - medication strength  - how much to take   Used for:  Depression with anxiety   Changed by:  Shameka Graves MD        Dose:  40 mg   Take 1 capsule (40 mg) by mouth daily   Quantity:  90 capsule   Refills:  1            Where to get your medicines      These medications were sent to [facility] - [address] [facility]  [address] [facility] [address]     Phone:  [phone]     cyclobenzaprine 5 MG tablet    FLUoxetine 40 MG capsule                Primary Care Provider Office Phone # Fax #    Krysten Duncan [phone] [address] [facility] [address]        Equal Access to Services     GLADYS BRUNSON AH: Hadii maria del carmen malave hadasho Sodellaali, waaxda luqadaha, qaybta kaalmada adeegyada, waxay ashlie hayfabian benz. So [facility] [phone].    ATENCIÓN: Si habla español, tiene a gonzalez disposición servicios gratuitos de asistencia lingüística. Llame al [phone].    We comply with applicable federal civil rights laws and [place] laws. We do not discriminate on the basis of race, color, national origin, age, disability, sex, sexual orientation, or gender identity.            Thank you!     Thank you for choosing [facility]  for your care. Our goal is always to provide you with  excellent care. Hearing back from our patients is one way we can continue to improve our services. Please take a few minutes to complete the written survey that you may receive in the mail after your visit with us. Thank you!             Your Updated Medication List - Protect others around you: Learn how to safely use, store and throw away your medicines at www.disposemymeds.org.          This list is accurate as of 10/10/18  3:40 PM.  Always use your most recent med list.                   Brand Name Dispense Instructions for use Diagnosis    amitriptyline 50 MG tablet    ELAVIL    180 tablet    TAKE ONE OR TWO TABLETS BY MOUTH AT BEDTIME    Mild recurrent major depression (H), Migraine without status migrainosus, not intractable, unspecified migraine type       cyclobenzaprine 5 MG tablet    FLEXERIL    42 tablet    Take 1 tablet (5 mg) by mouth 3 times daily as needed for muscle spasms    Torticollis, acute       FLUoxetine 40 MG capsule    PROzac    90 capsule    Take 1 capsule (40 mg) by mouth daily    Depression with anxiety       MIRENA (52 MG) 20 MCG/24HR IUD   Generic drug:  levonorgestrel      1 each by Intrauterine route once.

## 2018-10-11 LAB
ANA SER QL IF: NEGATIVE
RHEUMATOID FACT SER NEPH-ACNC: <20 IU/ML (ref 0–20)

## 2018-10-11 ASSESSMENT — ANXIETY QUESTIONNAIRES: GAD7 TOTAL SCORE: 14

## 2018-10-11 ASSESSMENT — PATIENT HEALTH QUESTIONNAIRE - PHQ9: SUM OF ALL RESPONSES TO PHQ QUESTIONS 1-9: 9

## 2018-10-15 ENCOUNTER — TELEPHONE (OUTPATIENT)
Dept: PEDIATRICS | Facility: CLINIC | Age: 38
End: 2018-10-15

## 2018-10-15 DIAGNOSIS — Z20.7 SCABIES EXPOSURE: ICD-10-CM

## 2018-10-15 DIAGNOSIS — Z20.7 SCABIES EXPOSURE: Primary | ICD-10-CM

## 2018-10-15 RX ORDER — PERMETHRIN 50 MG/G
CREAM TOPICAL
Qty: 60 G | Refills: 1 | Status: SHIPPED | OUTPATIENT
Start: 2018-10-15 | End: 2021-03-31

## 2018-10-15 NOTE — TELEPHONE ENCOUNTER
Called and left vague message for patient to call clinic back regarding medication request. [phone]  Ana Lilia Diaz RN on 10/15/2018 at 5:53 PM

## 2018-10-15 NOTE — TELEPHONE ENCOUNTER
Per patient~ Dr.Jennifer Fermin prescribed Permethrin 5% today for patient and her son, and pharmacy is calling to request this order as they have not gotten any orders for patient or her son. Call to advise. Thank you

## 2018-10-16 RX ORDER — PERMETHRIN 50 MG/G
CREAM TOPICAL
Qty: 60 G | Refills: 1 | Status: CANCELLED | OUTPATIENT
Start: 2018-10-16

## 2018-10-16 NOTE — TELEPHONE ENCOUNTER
Spoke with patient and she did get the  pharmacy to give verbal order to [facility] for rx. Med picked up last night.  Patient very upset that it took so long last night to get the script.  RN apologized for the issue.  Janina Pyle RN

## 2018-10-23 ENCOUNTER — TELEPHONE (OUTPATIENT)
Dept: FAMILY MEDICINE | Facility: CLINIC | Age: 38
End: 2018-10-23

## 2018-10-24 DIAGNOSIS — I73.00 RAYNAUD'S PHENOMENON WITHOUT GANGRENE: Primary | ICD-10-CM

## 2018-10-24 RX ORDER — AMLODIPINE BESYLATE 2.5 MG/1
2.5 TABLET ORAL DAILY
Qty: 30 TABLET | Refills: 0 | Status: SHIPPED | OUTPATIENT
Start: 2018-10-24 | End: 2018-11-29

## 2018-10-24 NOTE — TELEPHONE ENCOUNTER
Called and spoke with patient.   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 States that she is already seeing one.    Regarding the Raynaud phenomenon/disease:  patient education given- this causes vasoconstriction of blood vessels and can cause pain. Can be triggered by stress and cold.   Encouraged efforts to manage stress better and keep extremities warm.   Calcium channel blockers can also help with symptoms, patient was interested in started pharmacotherapy.   Rx sent.   Recommended to follow up with her PCP in 2-4 weeks to reassess symptoms.    Note: This was a difficult phone call due to patient phone behavior, will notify Sidra, clinic manager about this Tele encounter.

## 2018-10-24 NOTE — TELEPHONE ENCOUNTER
Results sent to patients my chart on 10/12/18    Notes Recorded by Shameka Graves MD on 10/12/2018 at 11:18 AM  Jena,     Your recent labs came back normal.   No evidence of diabetes, Vitamin B12 deficiency or autoimmune disease.   Let me know if the symptoms persist and will refer you to Neurology for further evaluation.     Shameka Graves M.D       Spoke with patient informed her results were sent to her my chart. She is not able to get onto her my chart. Offered help desk for my chart and patient requested this to be sent to her. Results and recommendation given to patient. She already sees a neurologist for her migraines at [facility] neurology. Patient asking what a neurologist is going to do with the symptoms she is having? States she looked up information herself and it sounds like a circulation issue. Informed patient I will forward this information to Dr. Graves

## 2018-11-29 DIAGNOSIS — I73.00 RAYNAUD'S PHENOMENON WITHOUT GANGRENE: ICD-10-CM

## 2018-11-29 NOTE — TELEPHONE ENCOUNTER
Routing refill request to provider for review/approval because:  Labs not current:  Cr  New medication started on 10/24/18  Pt due for follow up from 10/10 appt  Instructions        Return in about 1 month (around 11/10/2018) for Follow up, Physical Exam at earliest convenience.

## 2018-11-29 NOTE — TELEPHONE ENCOUNTER
"Requested Prescriptions   Pending Prescriptions Disp Refills     amLODIPine (NORVASC) 2.5 MG tablet [Pharmacy Med Name: AMLODIPINE BESYLATE 2.5 MG TAB] 30 tablet 0    Last Written Prescription Date:  10/24/18  Last Fill Quantity: 30,  # refills: 0   Last office visit: 10/10/2018 with prescribing provider:  ERYN Graves Future Office Visit:     Sig: TAKE 1 TABLET BY MOUTH EVERY DAY    Calcium Channel Blockers Protocol  Failed    11/29/2018  2:36 AM       Failed - Normal serum creatinine on file in past 12 months    Recent Labs   Lab Test 08/23/11   CRPOC  0.7            Passed - Blood pressure under 140/90 in past 12 months    BP Readings from Last 3 Encounters:   10/10/18 110/72   02/22/18 114/70   01/15/18 112/65                Passed - Recent (12 mo) or future (30 days) visit within the authorizing provider's specialty    Patient had office visit in the last 12 months or has a visit in the next 30 days with authorizing provider or within the authorizing provider's specialty.  See \"Patient Info\" tab in inbasket, or \"Choose Columns\" in Meds & Orders section of the refill encounter.             Passed - Patient is age 18 or older       Passed - No active pregnancy on record       Passed - No positive pregnancy test in past 12 months          "

## 2018-11-30 RX ORDER — AMLODIPINE BESYLATE 2.5 MG/1
TABLET ORAL
Qty: 30 TABLET | Refills: 0 | Status: SHIPPED | OUTPATIENT
Start: 2018-11-30 | End: 2021-03-31

## 2019-03-09 ENCOUNTER — HEALTH MAINTENANCE LETTER (OUTPATIENT)
Age: 39
End: 2019-03-09

## 2019-10-03 ENCOUNTER — HEALTH MAINTENANCE LETTER (OUTPATIENT)
Age: 39
End: 2019-10-03

## 2020-11-07 ENCOUNTER — HEALTH MAINTENANCE LETTER (OUTPATIENT)
Age: 40
End: 2020-11-07

## 2021-03-25 ENCOUNTER — TELEPHONE (OUTPATIENT)
Dept: OBGYN | Facility: CLINIC | Age: 41
End: 2021-03-25

## 2021-03-25 NOTE — TELEPHONE ENCOUNTER
Reason for Call:  Other appointment    Detailed comments: Pt is due to have her IUD removed and replaced.    Phone Number Patient can be reached at: Cell number on file:    Telephone Information:   Mobile [phone]       Best Time: asap    Can we leave a detailed message on this number? YES    Call taken on 3/25/2021 at 12:01 PM by Janiya Canela

## 2021-03-27 ENCOUNTER — HEALTH MAINTENANCE LETTER (OUTPATIENT)
Age: 41
End: 2021-03-27

## 2021-03-31 ENCOUNTER — OFFICE VISIT (OUTPATIENT)
Dept: OBGYN | Facility: CLINIC | Age: 41
End: 2021-03-31
Payer: COMMERCIAL

## 2021-03-31 VITALS
HEIGHT: 66 IN | DIASTOLIC BLOOD PRESSURE: 80 MMHG | HEART RATE: 74 BPM | BODY MASS INDEX: 30.6 KG/M2 | SYSTOLIC BLOOD PRESSURE: 124 MMHG | WEIGHT: 190.4 LBS | TEMPERATURE: 97.5 F | OXYGEN SATURATION: 98 %

## 2021-03-31 DIAGNOSIS — Z12.4 SCREENING FOR MALIGNANT NEOPLASM OF CERVIX: ICD-10-CM

## 2021-03-31 DIAGNOSIS — Z30.09 BIRTH CONTROL COUNSELING: Primary | ICD-10-CM

## 2021-03-31 PROCEDURE — G0145 SCR C/V CYTO,THINLAYER,RESCR: HCPCS | Performed by: NURSE PRACTITIONER

## 2021-03-31 PROCEDURE — G0124 SCREEN C/V THIN LAYER BY MD: HCPCS

## 2021-03-31 PROCEDURE — 87624 HPV HI-RISK TYP POOLED RSLT: CPT | Performed by: NURSE PRACTITIONER

## 2021-03-31 PROCEDURE — 99202 OFFICE O/P NEW SF 15 MIN: CPT | Performed by: NURSE PRACTITIONER

## 2021-03-31 ASSESSMENT — MIFFLIN-ST. JEOR: SCORE: 1555.4

## 2021-03-31 ASSESSMENT — PAIN SCALES - GENERAL: PAINLEVEL: NO PAIN (0)

## 2021-03-31 NOTE — PROGRESS NOTES
Assessment & Plan     Screening for malignant neoplasm of cervix  - Pap imaged thin layer screen with HPV - recommended age 30 - 65 years (select HPV order below)  - HPV High Risk Types DNA Cervical    Birth control counseling  We discussed all her options for contraception. Has not had any issues with the Mirena IUD, but is still interested in permanent sterilization if she is a candidate. Does not desire pregnancy. Reviewed the options of Tubal ligation versus Salpingectomy.  Discussed the reasoning for removal of the tube, in that it may decrease her risk of cancer in the future.  Reviewed the risks, benefits and alternatives of Tubal Ligation/Salpingectomy. Reviewed permanence of procedure and the fact that there is no reversal option for salpingectomy. Desires to leave her current IUD in place for now and consult appointment scheduled with Dr. Crook to discuss permanent sterilization.      15 minutes spent on the date of the encounter doing chart review, history and exam, documentation and further activities per the note-this is over and above time spent in her exam and pap smear collection.[id number]}     ZEYAD Redman [facility]   Jena is a 40 year old who presents for the following health issues     HPI     Originally scheduled for a Mirena IUD replacement. Current IUD was inserted 12/2015. Patient expressed extreme anxiety regarding replacement mentioned she'd prefer a permanent option but has been told it was not an option in the past.  Does not desire any future pregnancy. Will not remember to take pills regularly. Did not like Depo Provera. Not interested in Nexplanon. Feels this is her only option. Due for a pap smear and is amenable to completing this today.    Review of Systems   Constitutional, HEENT, cardiovascular, pulmonary, gi and gu systems are negative, except as otherwise noted.      Objective    /80 (BP Location: Right arm,  "Patient Position: Sitting, Cuff Size: Adult Regular)   Pulse 74   Temp 97.5  F (36.4  C) (Tympanic)   Ht 1.676 m (5' 6\")   Wt 86.4 kg (190 lb 6.4 oz)   SpO2 98%   BMI 30.73 kg/m    Body mass index is 30.73 kg/m .  Physical Exam   GENERAL: healthy, alert and no distress  ABDOMEN: soft, nontender, no hepatosplenomegaly, no masses and bowel sounds normal   (female): normal female external genitalia, normal urethral meatus, vaginal mucosa, normal cervix/adnexa/uterus without masses or discharge  MS: no gross musculoskeletal defects noted, no edema  SKIN: no suspicious lesions or rashes  PSYCH: mentation appears normal, affect normal/bright    "

## 2021-04-02 LAB
COPATH REPORT: NORMAL
PAP: NORMAL

## 2021-04-06 LAB
FINAL DIAGNOSIS: NORMAL
HPV HR 12 DNA CVX QL NAA+PROBE: NEGATIVE
HPV16 DNA SPEC QL NAA+PROBE: NEGATIVE
HPV18 DNA SPEC QL NAA+PROBE: NEGATIVE
SPECIMEN DESCRIPTION: NORMAL
SPECIMEN SOURCE CVX/VAG CYTO: NORMAL

## 2021-04-22 ENCOUNTER — OFFICE VISIT (OUTPATIENT)
Dept: OBGYN | Facility: CLINIC | Age: 41
End: 2021-04-22
Payer: COMMERCIAL

## 2021-04-22 VITALS
WEIGHT: 189.6 LBS | BODY MASS INDEX: 30.6 KG/M2 | DIASTOLIC BLOOD PRESSURE: 78 MMHG | SYSTOLIC BLOOD PRESSURE: 112 MMHG | HEART RATE: 79 BPM | OXYGEN SATURATION: 99 %

## 2021-04-22 DIAGNOSIS — N92.0 MENORRHAGIA WITH REGULAR CYCLE: ICD-10-CM

## 2021-04-22 DIAGNOSIS — Z01.818 PRE-PROCEDURAL EXAMINATION: Primary | ICD-10-CM

## 2021-04-22 PROCEDURE — 99215 OFFICE O/P EST HI 40 MIN: CPT | Performed by: OBSTETRICS & GYNECOLOGY

## 2021-04-22 RX ORDER — NAPROXEN 250 MG/1
500 TABLET ORAL ONCE
Qty: 2 TABLET | Refills: 0 | Status: SHIPPED | OUTPATIENT
Start: 2021-04-22 | End: 2021-04-22

## 2021-04-22 NOTE — PROGRESS NOTES
Jena is a 40 year old   here to discuss options.  Her Mirena is about to .  She is using it for both contraception, but also cycle control.  She has horribly heavy and painful periods when she isn't on a progestin.  .  However, the most recent IUD insertion was painful, with significant cramping.  She is contemplating whether she should have a hysterectomy, to manage her cycles.  ROS: No urinary frequency or dysuria, bladder or kidney problems, POSITIVE for:, painful menses, heavy periods  ROS: Ten point review of systems was reviewed and negative except the above.    PMH: Her past medical, surgical, and obstetric histories were reviewed and are documented in their appropriate chart areas.    ALL/Meds: Her medication and allergy histories were reviewed and are documented in their appropriate chart areas.    SH/FMH: Reviewed and documented in the appropriate area.    PE: /78   Pulse 79   Wt 86 kg (189 lb 9.6 oz)   SpO2 99%   Breastfeeding No   BMI 30.60 kg/m      Discussed continuing to use another Mirena, if we could make insertion more tolerable.  We could use NSAIDS pre-procedure and do a cervical block at the time of insertion.  Reviewed the risks, benefits, and alternatives of hysterectomy including but not limited to bleeding, infection, injury to bowel,bladder and other structures.  The patient is aware that hysterectomy will render her sterile and unable to have further children.  We discussed the different routes of surgery including abdominal, vaginal, and laparoscopic and the possibility that the route of surgery may change during the procedure.  We discussed both total and supracervical hysterectomy and the benefits and contraindications involved.  We discussed ovarian sparing as well as oophrectomy. Reviewed pre and post op course. Patient was given the opportunity to ask questions and have them answered. The patient is willing to try Naproxsyn 30-60 minutes before IUD  insertion.  We will plan to use 1% lidocaine as a cervical block and then remove and re-insert a new Mirena.    A/P:   Jena presents with (Z01.098) Pre-procedural examination  (primary encounter diagnosis)  Comment: 45 minutes spent on the date of the encounter doing chart review, patient visit and documentation   Plan: naproxen (NAPROSYN) 250 MG tablet  (N92.0) Menorrhagia with regular cycle  Comment:   Plan: Repeat Placement of Mirena IUD.                No orders of the defined types were placed in this encounter.        Carloz Crook MD FACOG

## 2021-05-26 ENCOUNTER — TELEPHONE (OUTPATIENT)
Dept: OBGYN | Facility: CLINIC | Age: 41
End: 2021-05-26

## 2021-05-26 DIAGNOSIS — Z30.431 ENCOUNTER FOR MANAGEMENT OF INTRAUTERINE CONTRACEPTIVE DEVICE (IUD), UNSPECIFIED IUD MANAGEMENT TYPE: Primary | ICD-10-CM

## 2021-05-26 RX ORDER — NAPROXEN 500 MG/1
500 TABLET ORAL ONCE
Qty: 1 TABLET | Refills: 0 | Status: SHIPPED | OUTPATIENT
Start: 2021-05-26 | End: 2021-05-26

## 2021-05-26 NOTE — TELEPHONE ENCOUNTER
Pt made aware of RX at [facility]. Pt will call to switch to her [facility] pharmacy.    LAURENT PattersonN RN

## 2021-05-26 NOTE — TELEPHONE ENCOUNTER
"Last seen 4/22/21 to discuss contraception/heavy bleeding treatment options.    Pt decided to remove and replace IUD. RN assisted with scheduling for 6/7/21.  Due to previous pain w/insertion pt and provider discussed cervical block at time of procedure as well as pain medication.     Per provider's OV note, \"We could use NSAIDS pre-procedure and do a cervical block at the time of insertion.  Pt's understanding was there would be a RX for her to  at [facility] before her procedure. No RX currently ordered.    Routing to provider to clarify pre-procedure medication plans for OTC or RX.     LAURENT PattersonN RN    "

## 2021-05-26 NOTE — TELEPHONE ENCOUNTER
Reason for call:  Other   Patient called regarding (reason for call): appointment  Additional comments: Patient would like to have IUD removed by Dr Corok. Pt experiencing pain    Phone number to reach patient:  Home number on file [phone] (home)    Best Time:  any    Can we leave a detailed message on this number?  YES    Travel screening: Not Applicable

## 2021-06-07 ENCOUNTER — OFFICE VISIT (OUTPATIENT)
Dept: OBGYN | Facility: CLINIC | Age: 41
End: 2021-06-07
Payer: COMMERCIAL

## 2021-06-07 VITALS
OXYGEN SATURATION: 100 % | BODY MASS INDEX: 30.7 KG/M2 | HEART RATE: 74 BPM | WEIGHT: 190.2 LBS | DIASTOLIC BLOOD PRESSURE: 81 MMHG | SYSTOLIC BLOOD PRESSURE: 125 MMHG

## 2021-06-07 DIAGNOSIS — Z30.433 ENCOUNTER FOR REMOVAL AND REINSERTION OF INTRAUTERINE CONTRACEPTIVE DEVICE: Primary | ICD-10-CM

## 2021-06-07 DIAGNOSIS — Z30.432 ENCOUNTER FOR IUD REMOVAL: ICD-10-CM

## 2021-06-07 DIAGNOSIS — Z30.430 ENCOUNTER FOR IUD INSERTION: ICD-10-CM

## 2021-06-07 PROCEDURE — 58301 REMOVE INTRAUTERINE DEVICE: CPT | Performed by: OBSTETRICS & GYNECOLOGY

## 2021-06-07 PROCEDURE — 58300 INSERT INTRAUTERINE DEVICE: CPT | Performed by: OBSTETRICS & GYNECOLOGY

## 2021-06-07 NOTE — PROGRESS NOTES
Pre:procedure DX:  IUD for contraception  Post-Procedure DX:  same  Procedure:  Removal of Intrauterine Device/ Insertion of IUD    After verifying consent, the cervix was visualized using a speculum.    The IUD strings are visible.   The IUD was removed without difficulty and was verified to be intact.  The patient tolerated the procedure well.      Pathology:  The IUD is  intact.  It is discarded per protocol.  Jena Pinedo is a 40 year old  Women who presents today requesting placement of an Mirena iud.    The patient meets and is agreeable to the following conditions:  She currently is in a stable, monogamous relationship.  There is no previous history of pelvic inflammatory disease.  There is no previous history of ectopic pregnancy.  She is willing to check monthly for the IUD string.  There is no history of unresolved abnormal uterine bleeding.   There is no history of an unresolved abnormal PAP smear.   She has no history of Paul's disease or an allergy to copper (for Paragu[place]).   She has had a PAP smear within the past 1 year.  She denies the possibility of pregnancy.       We discussed risks, benefits, and alternatives including but not limited to:   Possibility of pregnancy and ectopic pregnancy.  Possibility of pelvic inflammatory disease, particularly with new partners.  Risk of uterine perforation, migration or IUD expulsion. Discussed that surgery might be required for removal in these cases.  Possibility of difficult removal.  Spotting or heavy bleeding.  Cramping, pain or infection during or after insertion.    The patient was offered patient information on the IUD and the patient education brochure from the .  The patient has given consent to proceed with placement of the IUD.  She wishes to proceed.  All questions answered.    PROCEDURE:    Type of IUD: Mirena   She is placed in a dorsal lithotomy potion and a pelvic exam is performed to determine the position of the  uterus.  The cervix is identified and cleaned with betadine.  A single tooth tenaculum is applied to the anterior lip of the cervix for stabilization.   The uterus sounded to 7.0 cm. (Target sound depth is 6.5 cm to 8.5 cm.)  The IUD is inserted into the uterus under sterile conditions in the usual fashion.    Lot number [id number] and expiration date 1/2023.  NDC#  90098-543-45 The IUD string is then cut to 2.0 cm.    The patient tolerated this procedure without immediate complication.  The patient is to return or call immediately for any unexplained fever, abdominal or pelvic pain, excessive bleeding, possibility of pregnancy, foul-smelling discharge, sense that the IUD has been expelled.  All questions were answered.    Return to clinic in 1 month for IUD check  Carloz Crook MD FACOG

## 2021-06-25 ENCOUNTER — OFFICE VISIT (OUTPATIENT)
Dept: URGENT CARE | Facility: URGENT CARE | Age: 41
End: 2021-06-25
Payer: COMMERCIAL

## 2021-06-25 VITALS
DIASTOLIC BLOOD PRESSURE: 68 MMHG | TEMPERATURE: 98.2 F | OXYGEN SATURATION: 100 % | HEART RATE: 74 BPM | SYSTOLIC BLOOD PRESSURE: 116 MMHG

## 2021-06-25 DIAGNOSIS — S29.9XXA TRAUMATIC INJURY OF RIB: Primary | ICD-10-CM

## 2021-06-25 PROCEDURE — 99213 OFFICE O/P EST LOW 20 MIN: CPT | Performed by: PHYSICIAN ASSISTANT

## 2021-06-25 ASSESSMENT — ENCOUNTER SYMPTOMS
COLOR CHANGE: 0
SHORTNESS OF BREATH: 0

## 2021-06-25 NOTE — PATIENT INSTRUCTIONS
Patient Education     Rib Contusion or Minor Fracture    A rib contusion is a bruise to one or more rib bones. It may cause pain, tenderness, swelling, and a purplish color to the skin. There may be a sharp pain with each breath. A rib contusion takes anywhere from a few days to a few weeks to heal. A minor rib fracture or break may cause the same symptoms as a rib contusion. The small crack may not be seen on a regular chest X-ray. Treatment for both problems is basically the same.  Home care    You may use over-the-counter pain medicine to control pain, unless another pain medicine was prescribed. If you have chronic liver or kidney disease or ever had a stomach ulcer or GI (gastrointestinal) bleeding, talk with your healthcare provider before using these medicines.    Rest. Don't lift anything heavy or do any activity that causes pain.    Apply an ice pack over the injured area for 15 to 20 minutes every 1 to 2 hours. You should do this for the first 24 to 48 hours. To make an ice pack, put ice cubes in a plastic bag that seals at the top. Wrap the bag in a clean, thin towel or cloth. Never put ice or an ice pack directly on the skin. Continue with ice packs as needed for the relief of pain and swelling.    The first 3 to 4 weeks of healing will be the most painful. If your pain is not under control with the treatment given, call your healthcare provider. Sometimes a stronger pain medicine may be needed. A nerve block can be done in case of severe pain. It will numb the nerve between the ribs.  Follow-up care  Follow up with your healthcare provider, or as advised.  If X-rays were taken, you will be told of any new findings that may affect your care.  Call 911  Call 911 if you have:    Dizziness, weakness or fainting    Shortness of breath with or without chest discomfort    New or worsening pain  When to seek medical advice  Call your healthcare provider right away if any of these occur:    Fever of 100.4 F  (38 C) or higher, or as directed by your healthcare provider    Chills    Stomach pain, vomiting  Camryn last reviewed this educational content on 6/1/2018 2000-2021 [employer]. All rights reserved. This information is not intended as a substitute for professional medical care. Always follow your healthcare professional's instructions.

## 2021-06-25 NOTE — PROGRESS NOTES
SUBJECTIVE:   Jena Pinedo is a 40 year old female presenting with a chief complaint of   Chief Complaint   Patient presents with     Rib Injury     left sided rib injury        She is an established patient of [facility].  Patient presents with left rib pain  After opening a window, reaching over a window one week ago.  Patient then slipped on a wet floor and landed on a chair and hit same area was yesterday.  No SOB.  No rib injury in past.      Treatment:  800 mg ibuprofen TID and tylenol.      Review of Systems   Respiratory: Negative for shortness of breath.    Cardiovascular: Positive for chest pain.   Skin: Negative.  Negative for color change.   All other systems reviewed and are negative.      Past Medical History:   Diagnosis Date     Allergic rhinitis     tree, grass, dust     Chronic neck pain 2011    physical therapy trial       Eczema      Major depressive disorder, recurrent episode, moderate degree (H)      Migraine headaches      Obesity 3/5/2013     Recurrent low back pain      Family History   Problem Relation Age of Onset     Hypertension Mother      Thyroid Disease Mother      Neurologic Disorder Mother         migraine     Hypertension Maternal Grandmother      C.A.D. Maternal Grandfather         MI     Cancer Paternal Grandmother         bone     Asthma Sister      Diabetes No family hx of      Current Outpatient Medications   Medication Sig Dispense Refill     levonorgestrel (MIRENA) 20 MCG/24HR IUD 1 each by Intrauterine route once.       tiZANidine (ZANAFLEX) 4 MG tablet Take 1 tablet (4 mg) by mouth 3 times daily 25 tablet 0     Social History     Tobacco Use     Smoking status: Former Smoker     Quit date: 3/31/2017     Years since quittin.2     Smokeless tobacco: Never Used   Substance Use Topics     Alcohol use: Yes       OBJECTIVE  /68   Pulse 74   Temp 98.2  F (36.8  C) (Tympanic)   LMP  (LMP Unknown)   SpO2 100%     Physical Exam  Vitals signs and  nursing note reviewed.   Constitutional:       Appearance: Normal appearance. She is normal weight.   Eyes:      Extraocular Movements: Extraocular movements intact.      Conjunctiva/sclera: Conjunctivae normal.   Cardiovascular:      Rate and Rhythm: Normal rate and regular rhythm.      Pulses: Normal pulses.      Heart sounds: Normal heart sounds.   Pulmonary:      Effort: Pulmonary effort is normal.      Breath sounds: Normal breath sounds.      Comments: Left side lower rib pain with AP and later compression.  Chest:      Chest wall: Tenderness present.   Skin:     General: Skin is warm and dry.   Neurological:      General: No focal deficit present.      Mental Status: She is alert.   Psychiatric:         Mood and Affect: Mood normal.         Behavior: Behavior normal.         Labs:  No results found for this or any previous visit (from the past 24 hour(s)).    X-Ray was not done.    ASSESSMENT:      ICD-10-CM    1. Traumatic injury of rib  S29.9XXA tiZANidine (ZANAFLEX) 4 MG tablet        Medical Decision Making:    Differential Diagnosis:  Rib fracture/contusion    Serious Comorbid Conditions:  Adult:  as above    PLAN:    Rx for zanaflex.  Continue with tylenol and motrin.  Discussed reasons to seek immediate medical attention.    Stressed the importance of deep breathes.      Followup:    If not improving or if condition worsens, follow up with your Primary Care Provider, If not improving or if conditions worsens over the next 12-24 hours, go to the Emergency Department    Patient Instructions     Patient Education     Rib Contusion or Minor Fracture    A rib contusion is a bruise to one or more rib bones. It may cause pain, tenderness, swelling, and a purplish color to the skin. There may be a sharp pain with each breath. A rib contusion takes anywhere from a few days to a few weeks to heal. A minor rib fracture or break may cause the same symptoms as a rib contusion. The small crack may not be seen on a  regular chest X-ray. Treatment for both problems is basically the same.  Home care    You may use over-the-counter pain medicine to control pain, unless another pain medicine was prescribed. If you have chronic liver or kidney disease or ever had a stomach ulcer or GI (gastrointestinal) bleeding, talk with your healthcare provider before using these medicines.    Rest. Don't lift anything heavy or do any activity that causes pain.    Apply an ice pack over the injured area for 15 to 20 minutes every 1 to 2 hours. You should do this for the first 24 to 48 hours. To make an ice pack, put ice cubes in a plastic bag that seals at the top. Wrap the bag in a clean, thin towel or cloth. Never put ice or an ice pack directly on the skin. Continue with ice packs as needed for the relief of pain and swelling.    The first 3 to 4 weeks of healing will be the most painful. If your pain is not under control with the treatment given, call your healthcare provider. Sometimes a stronger pain medicine may be needed. A nerve block can be done in case of severe pain. It will numb the nerve between the ribs.  Follow-up care  Follow up with your healthcare provider, or as advised.  If X-rays were taken, you will be told of any new findings that may affect your care.  Call 911  Call 911 if you have:    Dizziness, weakness or fainting    Shortness of breath with or without chest discomfort    New or worsening pain  When to seek medical advice  Call your healthcare provider right away if any of these occur:    Fever of 100.4 F (38 C) or higher, or as directed by your healthcare provider    Chills    Stomach pain, vomiting  Summon last reviewed this educational content on 6/1/2018 2000-2021 [employer]. All rights reserved. This information is not intended as a substitute for professional medical care. Always follow your healthcare professional's instructions.

## 2021-10-04 ENCOUNTER — NURSE TRIAGE (OUTPATIENT)
Dept: NURSING | Facility: CLINIC | Age: 41
End: 2021-10-04

## 2021-10-04 NOTE — TELEPHONE ENCOUNTER
Patient states she is not ready to discuss why she is calling, and states she will call back.    Madyson Shields, RN RN  [facility] Triage/refill nurse

## 2021-10-07 ENCOUNTER — OFFICE VISIT (OUTPATIENT)
Dept: FAMILY MEDICINE | Facility: CLINIC | Age: 41
End: 2021-10-07
Payer: COMMERCIAL

## 2021-10-07 VITALS
DIASTOLIC BLOOD PRESSURE: 80 MMHG | OXYGEN SATURATION: 100 % | SYSTOLIC BLOOD PRESSURE: 124 MMHG | TEMPERATURE: 97.7 F | BODY MASS INDEX: 30.18 KG/M2 | HEART RATE: 78 BPM | WEIGHT: 187 LBS

## 2021-10-07 DIAGNOSIS — F41.9 ANXIETY DISORDER, UNSPECIFIED TYPE: ICD-10-CM

## 2021-10-07 DIAGNOSIS — M54.2 NECK PAIN: ICD-10-CM

## 2021-10-07 DIAGNOSIS — F32.9 CURRENT EPISODE OF MAJOR DEPRESSIVE DISORDER WITHOUT PRIOR EPISODE, UNSPECIFIED DEPRESSION EPISODE SEVERITY: Primary | ICD-10-CM

## 2021-10-07 PROCEDURE — 96127 BRIEF EMOTIONAL/BEHAV ASSMT: CPT | Performed by: FAMILY MEDICINE

## 2021-10-07 PROCEDURE — 99214 OFFICE O/P EST MOD 30 MIN: CPT | Mod: 25 | Performed by: FAMILY MEDICINE

## 2021-10-07 PROCEDURE — 96372 THER/PROPH/DIAG INJ SC/IM: CPT | Performed by: FAMILY MEDICINE

## 2021-10-07 RX ORDER — TIZANIDINE 2 MG/1
2 TABLET ORAL 3 TIMES DAILY PRN
Qty: 90 TABLET | Refills: 1 | Status: SHIPPED | OUTPATIENT
Start: 2021-10-07 | End: 2022-01-10

## 2021-10-07 RX ORDER — MELOXICAM 15 MG/1
15 TABLET ORAL DAILY
Qty: 30 TABLET | Refills: 1 | Status: SHIPPED | OUTPATIENT
Start: 2021-10-07 | End: 2022-01-10

## 2021-10-07 RX ORDER — KETOROLAC TROMETHAMINE 30 MG/ML
30 INJECTION, SOLUTION INTRAMUSCULAR; INTRAVENOUS ONCE
Status: COMPLETED | OUTPATIENT
Start: 2021-10-07 | End: 2021-10-07

## 2021-10-07 RX ADMIN — KETOROLAC TROMETHAMINE 30 MG: 30 INJECTION, SOLUTION INTRAMUSCULAR; INTRAVENOUS at 14:39

## 2021-10-07 ASSESSMENT — PATIENT HEALTH QUESTIONNAIRE - PHQ9
SUM OF ALL RESPONSES TO PHQ QUESTIONS 1-9: 25
10. IF YOU CHECKED OFF ANY PROBLEMS, HOW DIFFICULT HAVE THESE PROBLEMS MADE IT FOR YOU TO DO YOUR WORK, TAKE CARE OF THINGS AT HOME, OR GET ALONG WITH OTHER PEOPLE: EXTREMELY DIFFICULT
SUM OF ALL RESPONSES TO PHQ QUESTIONS 1-9: 25

## 2021-10-07 NOTE — PROGRESS NOTES
"    Assessment & Plan     (F32.9) Current episode of major depressive disorder without prior episode, unspecified depression episode severity  (primary encounter diagnosis)  Comment: Patient with current episode of major depressive disorder.  She has been on Prozac in the past and stopped it about a year ago due to finger and COVID-19 pandemic.  Her symptoms started 1 month ago.  She reports feeling sad, anhedonia.  She denies any suicide ideation or planning.  Plan: MENTAL HEALTH REFERRAL  - Adult; Outpatient         Treatment; Individual/Couples/Family/Group         Therapy/Health Psychology; [facility] [phone]; We will contact you to         schedule the appointment or please call with         any questions, FLUoxetine (PROZAC) 20 MG         capsule          Discussed with patient her concern.  Will restart Prozac 20 mg daily.  Follow-up in 6 weeks  Referral to psychotherapy  (F41.9) Anxiety disorder, unspecified type  Comment:  Plan:    (M54.2) Neck pain  Comment:recurneck pain.  No red flags on exam.  Plan: ketorolac (TORADOL) injection 30 mg, tiZANidine        (ZANAFLEX) 2 MG tablet, meloxicam (MOBIC) 15 MG        tablet, Physical Therapy Referral        We will give Toradol 30 mg.  Started Zanaflex 2 mg.  Start taking Mobic 50 mg daily.  Referral to physical therapy.  If no improvement will refer to pain for local injection           BMI:   Estimated body mass index is 30.18 kg/m  as calculated from the following:    Height as of 3/31/21: 1.676 m (5' 6\").    Weight as of this encounter: 84.8 kg (187 lb).   Weight management plan: Discussed healthy diet and exercise guidelines    Depression Screening Follow Up    PHQ 10/7/2021   PHQ-9 Total Score 25   Q9: Thoughts of better off dead/self-harm past 2 weeks Several days   F/U: Thoughts of suicide or self-harm No   F/U: Safety concerns No     Last PHQ-9 10/7/2021   1.  Little interest or pleasure in doing things 3   2.  Feeling down, " depressed, or hopeless 3   3.  Trouble falling or staying asleep, or sleeping too much 3   4.  Feeling tired or having little energy 3   5.  Poor appetite or overeating 3   6.  Feeling bad about yourself 3   7.  Trouble concentrating 3   8.  Moving slowly or restless 3   Q9: Thoughts of better off dead/self-harm past 2 weeks 1   PHQ-9 Total Score 25   Difficulty at work, home, or with people -   In the past two weeks have you had thoughts of suicide or self harm? No   Do you have concerns about your personal safety or the safety of others? No         Follow Up    Follow Up Actions Taken      Discussed the following ways the patient can remain in a safe environment:        Return in about 6 weeks (around 11/18/2021).    Kelsey Shine MD  [facility]    Heber Bella is a 40 year old who presents for the following health issues     HPI     Depression Followup    How are you doing with your depression since your last visit? No change    Are you having other symptoms that might be associated with depression? No    Have you had a significant life event?  No     Are you feeling anxious or having panic attacks?   No    Do you have any concerns with your use of alcohol or other drugs? No      Need to be back on depression medication       Diagnosed at age 15 , she has been on Prozac 20 mg daily, she was also on Mistitling 50 mg daily   Ran out of medication , did not renew meds due to feeling better and COVID 19 pandemic   She has been very bad for a month symptoms :  Feeling sad, hopeless, anhedonia   She works as cook in a cafeteria ,.  No recent life changes or events .   She denies any suicide ideation or planning   [facility] Follow-up to PHQ 2/22/2018 10/10/2018 10/7/2021   PHQ-9 9. Suicide Ideation past 2 weeks Not at all Not at all Several days   Thoughts of suicide or self harm in past 2 weeks - - No   Thoughts of suicide or self harm in past 2 weeks - - No   PHQ-9 Safety concerns? - - No    PHQ-9 Safety concerns? - - No     She has of chronic neck pain and had local injection   Need a plan for shoulder and neck pain   No injury or trauma.     Social History     Tobacco Use     Smoking status: Former Smoker     Quit date: 3/31/2017     Years since quittin.5     Smokeless tobacco: Never Used   Substance Use Topics     Alcohol use: Yes     Drug use: No     PHQ 2018 10/10/2018 10/7/2021   PHQ-9 Total Score 14 9 25   Q9: Thoughts of better off dead/self-harm past 2 weeks Not at all Not at all Several days   F/U: Thoughts of suicide or self-harm - - No   F/U: Safety concerns - - No     BONILLA-7 SCORE 2017 2018 10/10/2018   Total Score - - -   Total Score 19 11 14     Last PHQ-9 10/7/2021   1.  Little interest or pleasure in doing things 3   2.  Feeling down, depressed, or hopeless 3   3.  Trouble falling or staying asleep, or sleeping too much 3   4.  Feeling tired or having little energy 3   5.  Poor appetite or overeating 3   6.  Feeling bad about yourself 3   7.  Trouble concentrating 3   8.  Moving slowly or restless 3   Q9: Thoughts of better off dead/self-harm past 2 weeks 1   PHQ-9 Total Score 25   Difficulty at work, home, or with people -   In the past two weeks have you had thoughts of suicide or self harm? No   Do you have concerns about your personal safety or the safety of others? No       Suicide Assessment Five-step Evaluation and Treatment (SAFE-T)    Musculoskeletal problem/pain  Onset/Duration: 3 weeks  Description  Location: shoulder - right  Joint Swelling: no  Redness: no  Pain: YES  Warmth: no  Intensity:  moderate  Progression of Symptoms:  same  Accompanying signs and symptoms:   Fevers: no  Numbness/tingling/weakness: YES  History  Trauma to the area: no  Recent illness:  no  Previous similar problem: no  Previous evaluation:  no  Precipitating or alleviating factors:  Aggravating factors include: using it, least painful in morning, worse at night  Therapies tried  and outcome: heat, ice, acetaminophen and Ibuprofen      Review of Systems   Constitutional, HEENT, cardiovascular, pulmonary, gi and gu systems are negative, except as otherwise noted.      Objective    /80   Pulse 78   Temp 97.7  F (36.5  C) (Tympanic)   Wt 84.8 kg (187 lb)   SpO2 100%   BMI 30.18 kg/m    Body mass index is 30.18 kg/m .  Physical Exam  Vitals and nursing note reviewed.   Constitutional:       General: She is not in acute distress.     Appearance: Normal appearance. She is not ill-appearing, toxic-appearing or diaphoretic.   Musculoskeletal:      Cervical back: No edema or erythema. Muscular tenderness present.   Neurological:      Mental Status: She is alert.   Psychiatric:         Mood and Affect: Mood is anxious and depressed. Affect is tearful.         Speech: Speech normal. Speech is not rapid and pressured or slurred.         Behavior: Behavior is slowed. Behavior is cooperative.                        Answers for HPI/ROS submitted by the patient on 10/7/2021  If you checked off any problems, how difficult have these problems made it for you to do your work, take care of things at home, or get along with other people?: Extremely difficult  PHQ9 TOTAL SCORE: 25

## 2021-10-27 ENCOUNTER — THERAPY VISIT (OUTPATIENT)
Dept: PHYSICAL THERAPY | Facility: CLINIC | Age: 41
End: 2021-10-27
Attending: FAMILY MEDICINE
Payer: COMMERCIAL

## 2021-10-27 DIAGNOSIS — M54.2 NECK PAIN: ICD-10-CM

## 2021-10-27 PROCEDURE — 97110 THERAPEUTIC EXERCISES: CPT | Mod: GP

## 2021-10-27 PROCEDURE — 97140 MANUAL THERAPY 1/> REGIONS: CPT | Mod: GP

## 2021-10-27 PROCEDURE — 97162 PT EVAL MOD COMPLEX 30 MIN: CPT | Mod: GP

## 2021-10-27 NOTE — PROGRESS NOTES
Physical Therapy Initial Evaluation  10/27/2021     Therapist Assessment: Jena Pinedo is a 40 year old female patient presenting to Physical Therapy with neck pain. Patient demonstrates right sided myofascial pain with limited neck and shoulder range of motion and strength. 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 Skilled PT services are necessary in order to reduce impairments and improve independent function.     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      General health as reported by patient: good.    Surgical history/trauma: Ankle fracture. She denies any significant current illness or recent hospital admissions. She denies any regional implanted devices.  Imaging: none  Medications: Anti depressants, muscle relaxants, meloxicam    Occupation: Cook Job duties: Lifting, carrying, driving, prolonged standing, pushing, pulling, repetitive tasks  Current exercise regimen/Recreational activities: no formal program  Barriers to treatment: none  Sleep: interrupted multiple times at night, able to sleep for a couple of hours at a time.     Red Flags: (Bold when present) - reviewed the following and denies  Vertebrobasilar Artery   Symptom   Dysphagia Drop Attack   Dysarthria Dizziness   Diplopia Paresthesia of lips   Spinal  Cord  Symptom   Bi/Quadriparesthesia Ataxia   Hemiparesthesia Urinary incontinence   Bi/Quadriparesis Fecal incontinence     Cranial Nerves - bold when abnormality is present  Cranial nerve   II-[place] VIII-Loss of Balance   III-Diplopia VIII-Hypoacousia   V-Facial paresthesia IX-Dysarthria   VII-Altered Taste IX-Dysphagia    X-Nausea         Patient's Goal(s): Alleviate the pain.     Objective  Neuro Screen MMT  Upper trap R 5/5*, L 5/5  Deltoid R 4/5 due to pain, L 5/5  Triceps R painful when tested at 90 deg, painless when tested at side.       Posture/Observation:  Rounded shoulders, forward head        AROM: (Major, Moderate, Minimal or Nil loss)  Movement Loss Degrees Pain   Flexion 30 R   Extension 50 R   Left Rotation 55 R   Right Rotation 46 R   Left Side Bending 22 R   Right Side bending 23        Palpation: TTP to right upper trap, supraspinatus         Neural Mobility Test:     UL2NT1: median nerve + pain in right scapula     Muscular Testing:   Strength: deferred due to pain    Upper Extremity ROM  Shoulder AROM Flex IR   Left wnl T3   Right 90*/ PROM: wnl without pain T10*   *denotes pain  Upper Extremity Strength  Shoulder MMT ER IR   Left 5/5 5/5   Right 5/5 5/5     Clusters: bold if (+) present     Cerv Radiculopathy Cerv Myelopathy   Spurling A Gait Deviation    Distraction  Hoffmans/Babinski     ULTT A Inverted Supinator    < 60 rot involved side Age >45        Special Tests   Spurling's: Positive right      ASSESSMENT/PLAN  Patient is a 40 year old female with cervical and right side shoulder complaints.    Patient has the following significant findings with corresponding treatment plan.                Diagnosis 1:  Right sided cervical/shoulder pain    Pain -  hot/cold therapy, manual therapy, splint/taping/bracing/orthotics, self management, education and home program  Decreased ROM/flexibility - manual therapy, therapeutic exercise and home program  Decreased strength - therapeutic exercise,  therapeutic activities and home program  Impaired posture - neuro re-education, therapeutic activities and home program    Therapy Evaluation Codes:   1) History comprised of:   Personal factors that impact the plan of care:      none.    Comorbidity factors that impact the plan of care are:      Depression, Migraines/headaches and Smoking.     Medications impacting care: Anti-depressant, Muscle relaxant and meloxicam.  2) Examination of Body Systems comprised of:   Body structures and functions that impact the plan of care:      Cervical spine and Shoulder.   Activity limitations that impact the plan of care are:      Cooking, Lifting and sleeping.  3) Clinical presentation characteristics are:   Evolving/Changing.  4) Decision-Making    Moderate complexity using standardized patient assessment instrument and/or measureable assessment of functional outcome.  Cumulative Therapy Evaluation is: Moderate complexity.    Previous and current functional limitations:  (See Goal Flow Sheet for this information)    Short term and Long term goals: (See Goal Flow Sheet for this information)     Communication ability:  Patient appears to be able to clearly communicate and understand verbal and written communication and follow directions correctly.  Treatment Explanation - The following has been discussed with the patient:   RX ordered/plan of care  Anticipated outcomes  Possible risks and side effects  This patient would benefit from PT intervention to resume normal activities.   Rehab potential is good.    Frequency:  1 X week, once daily  Duration:  for 8 weeks  Discharge Plan:  Achieve all LTG.  Independent in home treatment program.  Reach maximal therapeutic benefit.    Please refer to the daily flowsheet for treatment today, total treatment time and time spent performing 1:1 timed codes.

## 2021-11-05 ENCOUNTER — THERAPY VISIT (OUTPATIENT)
Dept: PHYSICAL THERAPY | Facility: CLINIC | Age: 41
End: 2021-11-05
Payer: COMMERCIAL

## 2021-11-05 DIAGNOSIS — G89.29 CHRONIC NECK PAIN: ICD-10-CM

## 2021-11-05 DIAGNOSIS — M54.2 CHRONIC NECK PAIN: ICD-10-CM

## 2021-11-05 PROCEDURE — 97140 MANUAL THERAPY 1/> REGIONS: CPT | Mod: GP

## 2021-11-05 PROCEDURE — 97110 THERAPEUTIC EXERCISES: CPT | Mod: GP

## 2021-11-12 ENCOUNTER — THERAPY VISIT (OUTPATIENT)
Dept: PHYSICAL THERAPY | Facility: CLINIC | Age: 41
End: 2021-11-12
Payer: COMMERCIAL

## 2021-11-12 DIAGNOSIS — G89.29 CHRONIC NECK PAIN: ICD-10-CM

## 2021-11-12 DIAGNOSIS — M54.2 CHRONIC NECK PAIN: ICD-10-CM

## 2021-11-12 PROCEDURE — 97140 MANUAL THERAPY 1/> REGIONS: CPT | Mod: GP

## 2021-11-12 PROCEDURE — 97110 THERAPEUTIC EXERCISES: CPT | Mod: GP

## 2022-01-10 ENCOUNTER — OFFICE VISIT (OUTPATIENT)
Dept: FAMILY MEDICINE | Facility: CLINIC | Age: 42
End: 2022-01-10
Payer: COMMERCIAL

## 2022-01-10 VITALS
WEIGHT: 175 LBS | BODY MASS INDEX: 27.47 KG/M2 | HEIGHT: 67 IN | DIASTOLIC BLOOD PRESSURE: 78 MMHG | TEMPERATURE: 99.2 F | SYSTOLIC BLOOD PRESSURE: 125 MMHG | HEART RATE: 80 BPM | OXYGEN SATURATION: 99 %

## 2022-01-10 DIAGNOSIS — G89.29 CHRONIC NECK PAIN: ICD-10-CM

## 2022-01-10 DIAGNOSIS — F33.0 MILD RECURRENT MAJOR DEPRESSION (H): Primary | ICD-10-CM

## 2022-01-10 DIAGNOSIS — M54.2 CHRONIC NECK PAIN: ICD-10-CM

## 2022-01-10 DIAGNOSIS — G43.909 MIGRAINE WITHOUT STATUS MIGRAINOSUS, NOT INTRACTABLE, UNSPECIFIED MIGRAINE TYPE: ICD-10-CM

## 2022-01-10 PROCEDURE — 99214 OFFICE O/P EST MOD 30 MIN: CPT | Performed by: PHYSICIAN ASSISTANT

## 2022-01-10 RX ORDER — AMITRIPTYLINE HYDROCHLORIDE 50 MG/1
TABLET ORAL
Qty: 90 TABLET | Refills: 0 | Status: SHIPPED | OUTPATIENT
Start: 2022-01-10 | End: 2022-04-04

## 2022-01-10 ASSESSMENT — ANXIETY QUESTIONNAIRES
5. BEING SO RESTLESS THAT IT IS HARD TO SIT STILL: NEARLY EVERY DAY
3. WORRYING TOO MUCH ABOUT DIFFERENT THINGS: MORE THAN HALF THE DAYS
IF YOU CHECKED OFF ANY PROBLEMS ON THIS QUESTIONNAIRE, HOW DIFFICULT HAVE THESE PROBLEMS MADE IT FOR YOU TO DO YOUR WORK, TAKE CARE OF THINGS AT HOME, OR GET ALONG WITH OTHER PEOPLE: VERY DIFFICULT
GAD7 TOTAL SCORE: 17
7. FEELING AFRAID AS IF SOMETHING AWFUL MIGHT HAPPEN: MORE THAN HALF THE DAYS
2. NOT BEING ABLE TO STOP OR CONTROL WORRYING: SEVERAL DAYS
6. BECOMING EASILY ANNOYED OR IRRITABLE: NEARLY EVERY DAY
1. FEELING NERVOUS, ANXIOUS, OR ON EDGE: NEARLY EVERY DAY

## 2022-01-10 ASSESSMENT — PATIENT HEALTH QUESTIONNAIRE - PHQ9
SUM OF ALL RESPONSES TO PHQ QUESTIONS 1-9: 17
5. POOR APPETITE OR OVEREATING: NEARLY EVERY DAY

## 2022-01-10 ASSESSMENT — MIFFLIN-ST. JEOR: SCORE: 1496.42

## 2022-01-10 NOTE — NURSING NOTE
"Chief Complaint   Patient presents with     Depression     Anxiety     Medication Request       Initial /78   Pulse 80   Temp 99.2  F (37.3  C) (Tympanic)   Ht 1.702 m (5' 7\")   Wt 79.4 kg (175 lb)   SpO2 99%   BMI 27.41 kg/m   Estimated body mass index is 27.41 kg/m  as calculated from the following:    Height as of this encounter: 1.702 m (5' 7\").    Weight as of this encounter: 79.4 kg (175 lb).  Medication Reconciliation: complete    GALA Salazar MA    "

## 2022-01-10 NOTE — PROGRESS NOTES
Assessment & Plan     Mild recurrent major depression (H)  She has been out of her prescription for a month.   She is aware of how to take the medication and was scheduled to be seen again in 3 months for follow up.   New referral for counseling also placed since she will not contact to schedule the appointment on her own.   - FLUoxetine (PROZAC) 20 MG capsule; Take 1 capsule (20 mg) by mouth daily  - Adult Mental Health Referral; Future    Chronic neck pain  New referral placed for Pain Management. She is frustrated that she has to make the appointment. The contact information was given, number highlighted for her to call. She was previously on elavil and can go back to that since it was more helpful for both migraine and neck pain.   - Pain Management Referral; Future    Migraine without status migrainosus, not intractable, unspecified migraine type  Triggered by her neck pain  - amitriptyline (ELAVIL) 50 MG tablet; TAKE ONE  TABLET BY MOUTH AT BEDTIME    Depression Screening Follow Up    PHQ 1/10/2022   PHQ-9 Total Score 17   Q9: Thoughts of better off dead/self-harm past 2 weeks Several days   F/U: Thoughts of suicide or self-harm -   F/U: Safety concerns -     Last PHQ-9 1/10/2022   1.  Little interest or pleasure in doing things 1   2.  Feeling down, depressed, or hopeless 2   3.  Trouble falling or staying asleep, or sleeping too much 3   4.  Feeling tired or having little energy 1   5.  Poor appetite or overeating 3   6.  Feeling bad about yourself 3   7.  Trouble concentrating 2   8.  Moving slowly or restless 1   Q9: Thoughts of better off dead/self-harm past 2 weeks 1   PHQ-9 Total Score 17   Difficulty at work, home, or with people Very difficult   In the past two weeks have you had thoughts of suicide or self harm? -   Do you have concerns about your personal safety or the safety of others? -               Follow Up    Follow Up Actions Taken  Patient to follow up with PCP.  Clinic staff to schedule  appointment if able.    Discussed the following ways the patient can remain in a safe environment:  dispose of old medications  and be around others      Return in about 3 months (around 4/10/2022) for depression / anxiety, with primary provider.    Kristen M. Kehr, PA-C  [facility]    Heber Bella is a 41 year old who presents for the following health issues     Patient does not have a primary provider. She has difficulty with keeping appointments. She is here today for depression. She does not like being on medication, but admits that she needs to be on medication. She has good control with fluoxetine 20 mg daily. She has been out now for a month. She is frustrated with herself.   She also was contacted by counseling services, but the times did not work and she did not reschedule.   She called today since she was off and mad this same day appointment to get back on her medications and address her neck pain also.     She has a history of chronic neck pain.   She is currently in physical therapy. She was using tizanidine, but ran out.   Previously, she was on elavil and she feels that worked better.   Previously at Pain Management in [place], but she can not remember the name of the clinic. She was treated with injections and opioids in the past. She stopped treating and used marijuana on her own for awhile. She stopped doing that and discussed Pain Management with the previous provider she saw, but did not make the appointment.     HPI     Depression and Anxiety Follow-Up    How are you doing with your depression since your last visit? No change    How are you doing with your anxiety since your last visit?  No change    Are you having other symptoms that might be associated with depression or anxiety? Lack of focus, 3 physical anxiety attack    Have you had a significant life event? No     Do you have any concerns with your use of alcohol or other drugs? No    Social History      Tobacco Use     Smoking status: Former Smoker     Quit date: 3/31/2017     Years since quittin.7     Smokeless tobacco: Never Used   Substance Use Topics     Alcohol use: Yes     Drug use: No     PHQ 2018 10/10/2018 10/7/2021   PHQ-9 Total Score 14 9 25   Q9: Thoughts of better off dead/self-harm past 2 weeks Not at all Not at all Several days   F/U: Thoughts of suicide or self-harm - - No   F/U: Safety concerns - - No     BONILLA-7 SCORE 2017 2018 10/10/2018   Total Score - - -   Total Score 19 11 14     Last PHQ-9 1/10/2022   1.  Little interest or pleasure in doing things 1   2.  Feeling down, depressed, or hopeless 2   3.  Trouble falling or staying asleep, or sleeping too much 3   4.  Feeling tired or having little energy 1   5.  Poor appetite or overeating 3   6.  Feeling bad about yourself 3   7.  Trouble concentrating 2   8.  Moving slowly or restless 1   Q9: Thoughts of better off dead/self-harm past 2 weeks 1   PHQ-9 Total Score 17   Difficulty at work, home, or with people Very difficult   In the past two weeks have you had thoughts of suicide or self harm? -   Do you have concerns about your personal safety or the safety of others? -     BONILLA-7  1/10/2022   1. Feeling nervous, anxious, or on edge 3   2. Not being able to stop or control worrying 1   3. Worrying too much about different things 2   4. Trouble relaxing 3   5. Being so restless that it is hard to sit still 3   6. Becoming easily annoyed or irritable 3   7. Feeling afraid, as if something awful might happen 2   BONILLA-7 Total Score 17   If you checked any problems, how difficult have they made it for you to do your work, take care of things at home, or get along with other people? Very difficult       Suicide Assessment Five-step Evaluation and Treatment (SAFE-T)          Review of Systems   Constitutional, HEENT, cardiovascular, pulmonary, GI, , musculoskeletal, neuro, skin, endocrine and psych systems are negative, except  "as otherwise noted.      Objective    /78   Pulse 80   Temp 99.2  F (37.3  C) (Tympanic)   Ht 1.702 m (5' 7\")   Wt 79.4 kg (175 lb)   SpO2 99%   BMI 27.41 kg/m    Body mass index is 27.41 kg/m .  Physical Exam   GENERAL: healthy, alert and no distress  MS: no gross musculoskeletal defects noted, no edema  SKIN: no suspicious lesions or rashes  NEURO: Normal strength and tone, mentation intact and speech normal  PSYCH: mentation appears normal, tearful, anxious, judgement and insight intact and appearance well groomed                "

## 2022-01-11 ASSESSMENT — ANXIETY QUESTIONNAIRES: GAD7 TOTAL SCORE: 17

## 2022-01-25 ENCOUNTER — OFFICE VISIT (OUTPATIENT)
Dept: URGENT CARE | Facility: URGENT CARE | Age: 42
End: 2022-01-25

## 2022-01-25 VITALS
HEART RATE: 77 BPM | DIASTOLIC BLOOD PRESSURE: 68 MMHG | WEIGHT: 176 LBS | BODY MASS INDEX: 27.57 KG/M2 | OXYGEN SATURATION: 100 % | TEMPERATURE: 98.8 F | SYSTOLIC BLOOD PRESSURE: 114 MMHG

## 2022-01-25 DIAGNOSIS — Z48.02 VISIT FOR SUTURE REMOVAL: Primary | ICD-10-CM

## 2022-01-25 PROCEDURE — 99212 OFFICE O/P EST SF 10 MIN: CPT | Performed by: NURSE PRACTITIONER

## 2022-01-25 NOTE — PATIENT INSTRUCTIONS
"  Patient Education     Stitches or Staple Removal  You were seen today for removal of your stitches or staples. Your wound is healing as expected. The wound has healed well enough that the stitches or staples can be removed. The wound will continue to heal for the next few months.   At this time there is no sign of infection.   Home care    If you have pain, take pain medicine as advised by your healthcare provider.     Keep your wound clean and protected by covering it with a bandage for the next week or so.     Wash your hands with soap and warm water before and after caring for your wound. This helps prevent infection.    Clean the wound gently with soap and clean, running water daily or as directed by your healthcare provider. Don't use iodine, alcohol, or other cleansers on the wound.  Gently pat it dry. Put on a new bandage, if needed. Don't reuse bandages.    If the area gets wet, gently pat it dry with a clean cloth. Replace the wet bandage with a dry one.    Check the wound daily for signs of infection. (These are listed under \"When to seek medical advice\" below.)    You may shower and bathe as usual. Swimming may be allowed, but check with your healthcare provider first.    Keep the wound out of prolonged direct sunlight. The new skin is very sensitive and can easily sunburn causing worse scarring.    Ask your provider about using over-the-counter scar removing creams if your wound is highly visible.    Follow-up care  Follow up with your healthcare provider as advised.  When to seek medical advice   Call your healthcare provider if any of the following occur:    Wound reopens or bleeds    Signs of an infection, such as:  ? Increasing redness or swelling around the wound  ? Increased warmth from the wound  ? Worsening pain  ? Red streaking lines away from the wound  ? Fluid draining from the wound    Fever of 100.4 F (38 C) or higher, or as directed by your healthcare provider  Camryn last reviewed this " educational content on 4/1/2018 2000-2021 [employer]. All rights reserved. This information is not intended as a substitute for professional medical care. Always follow your healthcare professional's instructions.

## 2022-01-25 NOTE — PROGRESS NOTES
Assessment & Plan     Visit for suture removal     4 sutures removed with ease and patient tolerated well. Watch closely for worsening symptoms of infection including fever, chills, redness, swelling, pain, purulent discharge and follow-up right away if develops.     Follow-up with PCP if symptoms develop.     Discussed red flag symptoms which warrant immediate visit in emergency room    All questions were answered and patient verbalized understanding. AVS reviewed with patient.     Maura Benton, DNP, APRN, CNP 1/25/2022 11:11 AM  [facility]     Jena is a 41 year old female who presents to clinic today for the following health issues:  Chief Complaint   Patient presents with     Suture Removal     Suture removal from the top of her head.      Jena Pinedo is a 41 year old patient presenting for suture removal. Jena Pinedo had unknown sutures placed 1/12/22 at [facility] 13 days ago on the top of her scalp and was advised to have them removed in 7-10 days and she delayed due anxiety of sitting in waiting room.  Denies fever, chills, drainage. She has had some mild pain of her head. Denies blurred vision, emesis, confusion.       Problem list, Medication list, Allergies, and Medical history reviewed in EPIC.    ROS:  Review of systems negative except for noted above        Objective    /68   Pulse 77   Temp 98.8  F (37.1  C) (Tympanic)   Wt 79.8 kg (176 lb)   SpO2 100%   BMI 27.57 kg/m    Physical Exam  Constitutional:       General: She is not in acute distress.     Appearance: She is not toxic-appearing or diaphoretic.   Skin:     General: Skin is warm and dry.      Comments: Approximately 1 inch healed laceration top of scalp with scabbing, no erythema, tenderness, drainage. 4 sutures in place   Neurological:      General: No focal deficit present.      Mental Status: She is alert and oriented to person, place, and time.       Sensory: No sensory deficit.              Verbal consent obtained from patient to take photo for medical electronic health record

## 2022-02-14 NOTE — PROGRESS NOTES
Discharge Note    Progress reporting period is from last progress note on   to Nov 12, 2021.    Jena failed to follow up and current status is unknown.  Please see information below for last relevant information on current status.  Patient seen for 3 visits.    SUBJECTIVE  Subjective changes noted by patient:  Patient says this has not been a good week. Her whole back and both shoulders have flared up. After last PT session had a good day for the rest of the day. This morning is in intolerable pain.   .  Current pain level is  .     Previous pain level was  8/10 (worst).   Changes in function:  Yes (See Goal flowsheet attached for changes in current functional level)  Adverse reaction to treatment or activity: None    OBJECTIVE  Changes noted in objective findings: Patient having a flare up of symptoms. No pain when performing cervical SNAGs     ASSESSMENT/PLAN  Diagnosis: Right neck/shoulder pain   Updated problem list and treatment plan:   Pain - HEP  Decreased ROM/flexibility - HEP  Decreased function - HEP  Decreased strength - HEP  Impaired posture - HEP  STG/LTGs have been met or progress has been made towards goals:  Yes, please see goal flowsheet for most current information  Assessment of Progress: current status is unknown.    Last current status: Pt is progressing slower than anticipated   Self Management Plans:  HEP  I have re-evaluated this patient and find that the nature, scope, duration and intensity of the therapy is appropriate for the medical condition of the patient.  Jena continues to require the following intervention to meet STG and LTG's:  HEP.    Recommendations:  Discharge with current home program.  Patient to follow up with MD as needed.    Please refer to the daily flowsheet for treatment today, total treatment time and time spent performing 1:1 timed codes.

## 2022-02-24 ENCOUNTER — OFFICE VISIT (OUTPATIENT)
Dept: ANESTHESIOLOGY | Facility: CLINIC | Age: 42
End: 2022-02-24
Payer: COMMERCIAL

## 2022-02-24 VITALS — SYSTOLIC BLOOD PRESSURE: 113 MMHG | DIASTOLIC BLOOD PRESSURE: 76 MMHG | OXYGEN SATURATION: 100 % | HEART RATE: 103 BPM

## 2022-02-24 DIAGNOSIS — M79.18 MYOFASCIAL PAIN: Primary | ICD-10-CM

## 2022-02-24 PROCEDURE — 99204 OFFICE O/P NEW MOD 45 MIN: CPT | Performed by: ANESTHESIOLOGY

## 2022-02-24 RX ORDER — LIDOCAINE 40 MG/G
CREAM TOPICAL
Qty: 30 G | Refills: 1 | Status: SHIPPED | OUTPATIENT
Start: 2022-02-24 | End: 2023-09-11

## 2022-02-24 RX ORDER — TIZANIDINE HYDROCHLORIDE 2 MG/1
4 CAPSULE, GELATIN COATED ORAL 3 TIMES DAILY
Qty: 30 CAPSULE | Refills: 0 | Status: SHIPPED | OUTPATIENT
Start: 2022-02-24 | End: 2022-04-04 | Stop reason: ALTCHOICE

## 2022-02-24 ASSESSMENT — ENCOUNTER SYMPTOMS
EYES NEGATIVE: 1
MYALGIAS: 1
BACK PAIN: 1
CARDIOVASCULAR NEGATIVE: 1
DEPRESSION: 1
HEADACHES: 1
NECK PAIN: 1
RESPIRATORY NEGATIVE: 1
FALLS: 1
CONSTITUTIONAL NEGATIVE: 1
ABDOMINAL PAIN: 0

## 2022-02-24 ASSESSMENT — PAIN SCALES - GENERAL: PAINLEVEL: MODERATE PAIN (4)

## 2022-02-24 NOTE — PATIENT INSTRUCTIONS
Medications:    lidocaine (LMX4) 4 % external cream.     tiZANidine (ZANAFLEX) 2 MG capsule. Take 2 capsules (4 mg) by mouth 3 times daily.      *Please provide the clinic with a minium of 1 week notice, on all prescription refills.         Treatment planning:    TENS Unit ordered.      Recommended Follow up:      Follow up with Ana Lilia Barillas in 2 months.          Please call [phone], option #1 to schedule your clinic appointment if you don't already have an appointment scheduled.        To speak with a nurse, schedule/reschedule/cancel a clinic appointment, or request a medication refill call: [phone], option #1.    You can also reach us by [employer]: [email or web address]

## 2022-02-24 NOTE — NURSING NOTE
Patient presents with:  Consult: UMP NEW,RM 12, patient reports, 4/10 pain in her R low back      Moderate Pain (4)         What medications are you using for pain?   Acetaminophen, Ibuprofen, Amitriptyline,     (New patients only) Have you been seen by another pain clinic/ provider? Patient has seen a pain clinic about 8 years ago.      Denzel Sosa, EMT

## 2022-02-24 NOTE — NURSING NOTE
RN reviewed AVS with patient. Patient to contact clinic if any questions/concerns. Patient verbalized understanding.    Justina Cheema RN

## 2022-02-24 NOTE — PROGRESS NOTES
[facility] Chronic [facility] Management : Consultation Note    Patient: Jena Pinedo Age: 41 year old   MRN: [id number] Referred by:  Kehr     Date of Visit: February 24, 2022    Reason for consultation:    Jena Pinedo is a 41 year old female who is seen in consultation today at the request of her provider,Dr. Kehr for a comprehensive evaluation and management of pain.  Primary Care Provider is No Ref-Primary, Physician.      Chief complaints:  Chronic neck pain, right lower back pain, shoulder pain     History of Present illness:     Jena Pinedo is a 41 year old female clinic with complaint of generalized body pain.  Pain now mostly located in the base of her neck, upper back, and sometimes in the lower back.  Pain is dull aching and constant in nature.  In the past she has managed her pain with muscle relaxant (tizanidine) which she ran out.  She states that she has been having this pain for a long time.  She has tried multiple modalities of treatment with some benefit.  She underwent trigger point injection in the mid back and lower back which helped her some.  She has significant depression issues for which she has been seeing a therapist.  Due to COVID-19 issues, she is unable to schedule therapist.     Her MRI of the lumbar thoracic and cervical spine performed in 2014 and is unremarkable.    Trials of therapies  including     PT: Yes: having pain   TENs Unit: Yes:   Manual Medicine/Chiropractic: Yes:   Surgeries:No  Acupuncture: No  Other Integrative therapies: No  Behavioral interventions: No  Previous medication treatments included: Tizanidine  Previous pain interventions: likely trigger point injection     [place] Prescription Monitoring Program:   Reviewed. No concerns    Review of Systems:  Review of Systems   Constitutional: Negative.    HENT: Negative.    Eyes: Negative.    Respiratory: Negative.    Cardiovascular: Negative.     Gastrointestinal: Negative for abdominal pain.   Musculoskeletal: Positive for back pain, falls, joint pain, myalgias and neck pain.   Skin: Negative.    Neurological: Positive for headaches.   Psychiatric/Behavioral: Positive for depression.       Patient Supplied Answers To the [facility] Pain Questionnaire  [facility] Pain -  Patient Entered Questionnaire/Answers 12/10/2015   What number best describes your pain right now:  0 = No pain  to  10 = Worst pain imaginable 6   How would you describe the pain? sharp, dull, aching, throbbing, pressure   Which of the following worsen your pain? sitting, walking, exercise   Which of the following improve or reduce your pain?  relaxation   What number best describes your average pain for the past week:  0 = No pain  to  10 = Worst pain imaginable 6   What number best describes your LOWEST pain in past 24 hours:  0 = No pain  to  10 = Worst pain imaginable 2   What number best describes your WORST pain in past 24 hours:  0 = No pain  to  10 = Worst pain imaginable 8   What non-medicine treatments have you already had for your pain? physical therapy, chiropractic care, spine injections (shots)   Have you tried treating your pain with medication?  Yes   Are you currently taking medications for your pain? No            BONILLA-7 SCORE 2/22/2018 10/10/2018 1/10/2022   Total Score - - -   Total Score 11 14 17        PHQ-2 ( 1999 Pfizer) 2/24/2022 1/19/2017   Q1: Little interest or pleasure in doing things 0 1   Q2: Feeling down, depressed or hopeless 0 1   PHQ-2 Score 0 2   Q1: Little interest or pleasure in doing things Not at all -   Q2: Feeling down, depressed or hopeless Not at all -   PHQ-2 Score 0 -        [facility] Follow-up to PHQ 10/10/2018 10/7/2021 1/10/2022   PHQ-9 9. Suicide Ideation past 2 weeks Not at all Several days Several days   Thoughts of suicide or self harm in past 2 weeks - No -   Thoughts of suicide or self harm in past 2 weeks - No -   PHQ-9 Safety concerns? - No -   PHQ-9  Safety concerns? - No -          Past Medical History:  Past Medical History:   Diagnosis Date     Allergic rhinitis     tree, grass, dust     Chronic neck pain 2011    physical therapy trial       Eczema      Major depressive disorder, recurrent episode, moderate degree (H)      Migraine headaches      Obesity 3/5/2013     Recurrent low back pain        Past Surgical History:  Past Surgical History:   Procedure Laterality Date     OPEN REDUCTION INTERNAL FIXATION ANKLE  2006    LT       Medications:  Current Outpatient Medications   Medication Sig Dispense Refill     amitriptyline (ELAVIL) 50 MG tablet TAKE ONE  TABLET BY MOUTH AT BEDTIME 90 tablet 0     FLUoxetine (PROZAC) 20 MG capsule Take 1 capsule (20 mg) by mouth daily 90 capsule 0     levonorgestrel (MIRENA) 20 MCG/24HR IUD 1 each by Intrauterine route once.           Medications related to Pain Management:   Medications related to Pain Management (From now, onward)    None          Allergies:       Allergies   Allergen Reactions     Nicotine      Patch, arm swelling, itchy, red       Social History:    Social History     Socioeconomic History     Marital status: Single     Spouse name: Jay     Number of children: 2     Years of education: 12     Highest education level: Not on file   Occupational History     Occupation: B[employer]     Employer: [employer] [place]     Comment: full time   Tobacco Use     Smoking status: Former Smoker     Quit date: 3/31/2017     Years since quittin.9     Smokeless tobacco: Never Used   Substance and Sexual Activity     Alcohol use: Yes     Drug use: No     Sexual activity: Yes     Partners: Male     Birth control/protection: I.U.D.   Other Topics Concern     Parent/sibling w/ CABG, MI or angioplasty before 65F 55M? No   Social History Narrative         Social Determinants of Health     Financial Resource Strain: Not on file   Food Insecurity: Not on file   Transportation Needs: Not on file   Physical  Activity: Not on file   Stress: Not on file   Social Connections: Not on file   Intimate Partner Violence: Not on file   Housing Stability: Not on file     Social History     Social History Narrative             Family history:  Family History   Problem Relation Age of Onset     Hypertension Mother      Thyroid Disease Mother      Neurologic Disorder Mother         migraine     Hypertension Maternal Grandmother      ERYNAMARI. Maternal Grandfather         MI     Cancer Paternal Grandmother         bone     Asthma Sister      Diabetes No family hx of          Physical Exam:  Vitals:    02/24/22 0713   BP: 113/76   Pulse: 103   SpO2: 100%       General: Awake in no apparent distress.   Eyes: Sclerae are anicteric. PERRLA, EOMI   Neck: supple, no masses.   Lungs: unlabored.   Heart: regular rate and rhythm   Abdomen: soft non tender.  Extremities: Pulses are well palpable, no peripheral edema.   Musculoskeletal: All muscle groups are normal in bulk and tone. The patient changes position without pain behavior. The patient walks with a normal gait. Posture is normal. Muscle strength was rated at 5/5 in all groups in the extremities. Examination of the joints reveals preserved range of motion.  Generalized tenderness noted in the upper back and lower back.The range of motion of the lumbar spine is normal  in all directions. The spinous processes in the cervical, thoracic, and lumbar spine are midline, with   tenderness over the paraspinous muscles and facet joints.There was no  tenderness to palpation noted over the sacroiliac joints .   Neurologic exam: Sensation to light touch intact throughout all dermatomes bilateral upper extremities and lower extremities  Psychiatric; Normal affect.   Skin: Warm and Dry.       LABORATORY VALUES:   No results for input(s): NA, POTASSIUM, CHLORIDE, CO2, ANIONGAP, GLC, BUN, CR, JOHNNY in the last 61971 hours.    CBC RESULTS:   Recent Labs   Lab Test 01/19/17  1121   WBC 9.0   RBC 3.95   HGB  12.6   HCT 38.0   MCV 96   MCH 31.9   MCHC 33.2   RDW 12.4          Most Recent 3 INR's:No lab results found.      Diagnostic tests:    No images are attached to the encounter.       MRI of cervical/lumbar/thoracic spine showing:     ASSESSMENT/PLAN:                             ASSESSMENT:    Encounter Diagnosis   Name Primary?     Myofascial pain Yes   History of anxiety depression  Recurrent low back pain  Unremarkable MRI of the cervical spine, thoracic spine and lumbar spine       PLAN:    - Medications.     Tizanidine 4 mg 3 times daily as needed  Lidocaine cream 4% 4 times daily as needed    - Interventional procedures:  Trigger point injections in the upper back and lower back as needed.  Patient would like to try noninvasive procedure first and will contact us when her pain is well managed.    - Labs and imaging: None needed for pain management.     - Rehab: Advised to perform home exercise using [employer] videos  [email or web address].  The patient is also encouraged to stay active as tolerated.     Recommend to use TENS (Transcutaneous Electrical Nerve Stimulation) unit.  Electrotherapy via a TENS unit involves placement of trial pads/electrodes on the skin over the painful area.   If the patient perceives benefit, the patient can continue to use the machine.  It offers the advantage of being noninterventional and under the control of the patient. Evidence suggests efficacy of TENS unit in some chronic pain conditions. PAIN  Volume 154, Issue 11, November 2013, Pages 2680-7673      - Psychology: recommend to follow with her pyschologist     - Integrated medicine: We discussed acupuncture therapy. We will refer the patient for acupuncture therapy in the future.    - Disposition: Follow-up with the clinic nurse practitioner.  .      Assessment will be ongoing with changes in treatment as indicated.  Benefits/risks/alternatives to treatment have been reviewed and the patient has  been instructed to contact this office if they have any questions or concerns.  This plan of care has been discussed with the patient and the patient is in agreement.     Baylee Moreno MD, PHD      TOTAL TIME: I spent 45 minutes including greater than 50% face-to-face time counseling her about her diagnosis and treatment options.

## 2022-02-24 NOTE — LETTER
2/24/2022       RE: Jena Pinedo  [address] [place] [address]     Dear Colleague,    Thank you for referring your patient, Jena Pinedo, to the [facility] [place] at [facility]. Please see a copy of my visit note below.    [facility] for Comprehensive Chronic Pain Management : Consultation Note    Patient: Jena Pinedo Age: 41 year old   MRN: [id number] Referred by:  Kehr     Date of Visit: February 24, 2022    Reason for consultation:    Jena Pinedo is a 41 year old female who is seen in consultation today at the request of her provider,Dr. Kehr for a comprehensive evaluation and management of pain.  Primary Care Provider is No Ref-Primary, Physician.      Chief complaints:  Chronic neck pain, right lower back pain, shoulder pain     History of Present illness:     Jena Pinedo is a 41 year old female clinic with complaint of generalized body pain.  Pain now mostly located in the base of her neck, upper back, and sometimes in the lower back.  Pain is dull aching and constant in nature.  In the past she has managed her pain with muscle relaxant (tizanidine) which she ran out.  She states that she has been having this pain for a long time.  She has tried multiple modalities of treatment with some benefit.  She underwent trigger point injection in the mid back and lower back which helped her some.  She has significant depression issues for which she has been seeing a therapist.  Due to COVID-19 issues, she is unable to schedule therapist.     Her MRI of the lumbar thoracic and cervical spine performed in 2014 and is unremarkable.    Trials of therapies  including     PT: Yes: having pain   TENs Unit: Yes:   Manual Medicine/Chiropractic: Yes:   Surgeries:No  Acupuncture: No  Other Integrative therapies: No  Behavioral interventions:  No  Previous medication treatments included: Tizanidine  Previous pain interventions: likely trigger point injection     [place] Prescription Monitoring Program:   Reviewed. No concerns    Review of Systems:  Review of Systems   Constitutional: Negative.    HENT: Negative.    Eyes: Negative.    Respiratory: Negative.    Cardiovascular: Negative.    Gastrointestinal: Negative for abdominal pain.   Musculoskeletal: Positive for back pain, falls, joint pain, myalgias and neck pain.   Skin: Negative.    Neurological: Positive for headaches.   Psychiatric/Behavioral: Positive for depression.       Patient Supplied Answers To the [facility] Pain Questionnaire  [facility] Pain -  Patient Entered Questionnaire/Answers 12/10/2015   What number best describes your pain right now:  0 = No pain  to  10 = Worst pain imaginable 6   How would you describe the pain? sharp, dull, aching, throbbing, pressure   Which of the following worsen your pain? sitting, walking, exercise   Which of the following improve or reduce your pain?  relaxation   What number best describes your average pain for the past week:  0 = No pain  to  10 = Worst pain imaginable 6   What number best describes your LOWEST pain in past 24 hours:  0 = No pain  to  10 = Worst pain imaginable 2   What number best describes your WORST pain in past 24 hours:  0 = No pain  to  10 = Worst pain imaginable 8   What non-medicine treatments have you already had for your pain? physical therapy, chiropractic care, spine injections (shots)   Have you tried treating your pain with medication?  Yes   Are you currently taking medications for your pain? No            BONILLA-7 SCORE 2/22/2018 10/10/2018 1/10/2022   Total Score - - -   Total Score 11 14 17        PHQ-2 ( 1999 Pfizer) 2/24/2022 1/19/2017   Q1: Little interest or pleasure in doing things 0 1   Q2: Feeling down, depressed or hopeless 0 1   PHQ-2 Score 0 2   Q1: Little interest or pleasure in doing things Not at all -   Q2: Feeling down,  depressed or hopeless Not at all -   PHQ-2 Score 0 -        [facility] Follow-up to PHQ 10/10/2018 10/7/2021 1/10/2022   PHQ-9 9. Suicide Ideation past 2 weeks Not at all Several days Several days   Thoughts of suicide or self harm in past 2 weeks - No -   Thoughts of suicide or self harm in past 2 weeks - No -   PHQ-9 Safety concerns? - No -   PHQ-9 Safety concerns? - No -          Past Medical History:  Past Medical History:   Diagnosis Date     Allergic rhinitis 2004    tree, grass, dust     Chronic neck pain 2011    physical therapy trial       Eczema      Major depressive disorder, recurrent episode, moderate degree (H)      Migraine headaches      Obesity 3/5/2013     Recurrent low back pain        Past Surgical History:  Past Surgical History:   Procedure Laterality Date     OPEN REDUCTION INTERNAL FIXATION ANKLE  2006    LT       Medications:  Current Outpatient Medications   Medication Sig Dispense Refill     amitriptyline (ELAVIL) 50 MG tablet TAKE ONE  TABLET BY MOUTH AT BEDTIME 90 tablet 0     FLUoxetine (PROZAC) 20 MG capsule Take 1 capsule (20 mg) by mouth daily 90 capsule 0     levonorgestrel (MIRENA) 20 MCG/24HR IUD 1 each by Intrauterine route once.           Medications related to Pain Management:   Medications related to Pain Management (From now, onward)    None          Allergies:       Allergies   Allergen Reactions     Nicotine      Patch, arm swelling, itchy, red       Social History:    Social History     Socioeconomic History     Marital status: Single     Spouse name: Jay     Number of children: 2     Years of education: 12     Highest education level: Not on file   Occupational History     Occupation: 8hands     Employer[place] [employer] [place]     Comment: full time   Tobacco Use     Smoking status: Former Smoker     Quit date: 3/31/2017     Years since quittin.9     Smokeless tobacco: Never Used   Substance and Sexual Activity     Alcohol use: Yes     Drug use: No     Sexual  activity: Yes     Partners: Male     Birth control/protection: I.U.D.   Other Topics Concern     Parent/sibling w/ CABG, MI or angioplasty before 65F 55M? No   Social History Narrative         Social Determinants of Health     Financial Resource Strain: Not on file   Food Insecurity: Not on file   Transportation Needs: Not on file   Physical Activity: Not on file   Stress: Not on file   Social Connections: Not on file   Intimate Partner Violence: Not on file   Housing Stability: Not on file     Social History     Social History Narrative             Family history:  Family History   Problem Relation Age of Onset     Hypertension Mother      Thyroid Disease Mother      Neurologic Disorder Mother         migraine     Hypertension Maternal Grandmother      C.A.D. Maternal Grandfather         MI     Cancer Paternal Grandmother         bone     Asthma Sister      Diabetes No family hx of          Physical Exam:  Vitals:    02/24/22 0713   BP: 113/76   Pulse: 103   SpO2: 100%       General: Awake in no apparent distress.   Eyes: Sclerae are anicteric. PERRLA, EOMI   Neck: supple, no masses.   Lungs: unlabored.   Heart: regular rate and rhythm   Abdomen: soft non tender.  Extremities: Pulses are well palpable, no peripheral edema.   Musculoskeletal: All muscle groups are normal in bulk and tone. The patient changes position without pain behavior. The patient walks with a normal gait. Posture is normal. Muscle strength was rated at 5/5 in all groups in the extremities. Examination of the joints reveals preserved range of motion.  Generalized tenderness noted in the upper back and lower back.The range of motion of the lumbar spine is normal  in all directions. The spinous processes in the cervical, thoracic, and lumbar spine are midline, with   tenderness over the paraspinous muscles and facet joints.There was no  tenderness to palpation noted over the sacroiliac joints .   Neurologic exam: Sensation to light touch intact  throughout all dermatomes bilateral upper extremities and lower extremities  Psychiatric; Normal affect.   Skin: Warm and Dry.       LABORATORY VALUES:   No results for input(s): NA, POTASSIUM, CHLORIDE, CO2, ANIONGAP, GLC, BUN, CR, JOHNNY in the last 57849 hours.    CBC RESULTS:   Recent Labs   Lab Test 01/19/17  1121   WBC 9.0   RBC 3.95   HGB 12.6   HCT 38.0   MCV 96   MCH 31.9   MCHC 33.2   RDW 12.4          Most Recent 3 INR's:No lab results found.      Diagnostic tests:    No images are attached to the encounter.       MRI of cervical/lumbar/thoracic spine showing:     ASSESSMENT/PLAN:                             ASSESSMENT:    Encounter Diagnosis   Name Primary?     Myofascial pain Yes   History of anxiety depression  Recurrent low back pain  Unremarkable MRI of the cervical spine, thoracic spine and lumbar spine       PLAN:    - Medications.     Tizanidine 4 mg 3 times daily as needed  Lidocaine cream 4% 4 times daily as needed    - Interventional procedures:  Trigger point injections in the upper back and lower back as needed.  Patient would like to try noninvasive procedure first and will contact us when her pain is well managed.    - Labs and imaging: None needed for pain management.     - Rehab: Advised to perform home exercise using [employer] videos  [email or web address].  The patient is also encouraged to stay active as tolerated.     Recommend to use TENS (Transcutaneous Electrical Nerve Stimulation) unit.  Electrotherapy via a TENS unit involves placement of trial pads/electrodes on the skin over the painful area.   If the patient perceives benefit, the patient can continue to use the machine.  It offers the advantage of being noninterventional and under the control of the patient. Evidence suggests efficacy of TENS unit in some chronic pain conditions. PAIN  Volume 154, Issue 11, November 2013, Pages 1723-6697      - Psychology: recommend to follow with her pyschologist      - Integrated medicine: We discussed acupuncture therapy. We will refer the patient for acupuncture therapy in the future.    - Disposition: Follow-up with the clinic nurse practitioner.  .    Assessment will be ongoing with changes in treatment as indicated.  Benefits/risks/alternatives to treatment have been reviewed and the patient has been instructed to contact this office if they have any questions or concerns.  This plan of care has been discussed with the patient and the patient is in agreement.       Baylee Moreno MD, PHD      TOTAL TIME: I spent 45 minutes including greater than 50% face-to-face time counseling her about her diagnosis and treatment options.

## 2022-03-01 ENCOUNTER — TELEPHONE (OUTPATIENT)
Dept: ANESTHESIOLOGY | Facility: CLINIC | Age: 42
End: 2022-03-01
Payer: COMMERCIAL

## 2022-03-01 DIAGNOSIS — M79.18 MYOFASCIAL PAIN: Primary | ICD-10-CM

## 2022-03-01 NOTE — TELEPHONE ENCOUNTER
[facility] Call Center    Phone Message    May a detailed message be left on voicemail: yes     Reason for Call: Other: Pt calling to say that her deductible has not been met and the Tens unit is too expensive for her at this time. Please call to discuss further.      Action Taken: Message routed to:  [facility]    Travel Screening: Not Applicable

## 2022-03-03 NOTE — TELEPHONE ENCOUNTER
[facility] Call Center    Phone Message    May a detailed message be left on voicemail: yes     Reason for Call: Other: Patient is calling since she has not heard back yet from the care team.     Action Taken: Message routed to:  [facility] Pain    Travel Screening: Not Applicable

## 2022-03-04 NOTE — TELEPHONE ENCOUNTER
RNCC reviewed chart. OTC TENs units are available on [employer], [employer], [employer] for under $50. Pt may explore those options if going through home medical equipment store is too costly for her.     Encounter routed to clinic EMT to update pt.     Bettie Mayers DNP, RN

## 2022-03-04 NOTE — TELEPHONE ENCOUNTER
"EMT called patient and informed them that a TENS unit is available over the counter. Patient became audibly upset and began cussing at the author. Patient was upset that Dr. Moreno had not prescribed things how she understood. Patient reports that no pain medication is helping her pain. EMT offered to schedule the patient for next week on Thursday @ 0700 with . After patient said they would  \"Put a Bullet through her head\". Before her 2 month follow up. EMT then offered to escalate the patient to talking to a RN. Patient accepted. EMT verbally updated RNCC's Justina and Bettie. Routed to Bettie RNCC for follow up.      Denzel Sosa, EMT    "

## 2022-03-04 NOTE — TELEPHONE ENCOUNTER
"RNCC returned call to pt to address suicidal ideation and verbally aggressive behavior towards clinic EMT during previous call.     Pt states that she \"meant what she said\" regarding thoughts of self-harm and \"has had a plan for years.\" She denies having firearms in the house. She also reports that she is at work right now and states she is safe at the present moment. She was advised to go the ED immediately if her suicidal ideations worsen and she becomes actively suicidal. We established that the nearest hospital is [facility] in [place]. She verbalized understanding and is in agreement with this plan. She also reports having trouble with setting up an appointment for mental health counseling, states appointments offered to her were \"not for months\" from now and needs help sooner than that. Pt was advised that priority/urgent mental health referral would be ordered for her, to which she was also agreeable.     Pt was advised that we understand she is frustrated, but she cannot be verbally aggressive and use profane speech with clinic staff. She stated that she is very sorry for her behavior and asked that RNCC relay message to clinic EMT.     Pt was scheduled for follow up with Dr. Moreno 3/10/22. Provider updated and stated that he would also reach out to pt.     Bettie Mayers DNP, RN    "

## 2022-04-04 ENCOUNTER — OFFICE VISIT (OUTPATIENT)
Dept: FAMILY MEDICINE | Facility: CLINIC | Age: 42
End: 2022-04-04
Payer: COMMERCIAL

## 2022-04-04 VITALS
BODY MASS INDEX: 26.63 KG/M2 | DIASTOLIC BLOOD PRESSURE: 76 MMHG | OXYGEN SATURATION: 100 % | WEIGHT: 170 LBS | HEART RATE: 90 BPM | TEMPERATURE: 98.4 F | SYSTOLIC BLOOD PRESSURE: 120 MMHG

## 2022-04-04 DIAGNOSIS — F33.0 MILD RECURRENT MAJOR DEPRESSION (H): Primary | ICD-10-CM

## 2022-04-04 DIAGNOSIS — F41.0 PANIC ATTACK: ICD-10-CM

## 2022-04-04 DIAGNOSIS — G43.909 MIGRAINE WITHOUT STATUS MIGRAINOSUS, NOT INTRACTABLE, UNSPECIFIED MIGRAINE TYPE: ICD-10-CM

## 2022-04-04 DIAGNOSIS — F41.9 ANXIETY DISORDER, UNSPECIFIED TYPE: ICD-10-CM

## 2022-04-04 PROCEDURE — 99214 OFFICE O/P EST MOD 30 MIN: CPT | Performed by: PHYSICIAN ASSISTANT

## 2022-04-04 RX ORDER — AMITRIPTYLINE HYDROCHLORIDE 50 MG/1
TABLET ORAL
Qty: 90 TABLET | Refills: 1 | Status: SHIPPED | OUTPATIENT
Start: 2022-04-04 | End: 2022-10-20

## 2022-04-04 RX ORDER — FLUOXETINE 40 MG/1
40 CAPSULE ORAL DAILY
Qty: 90 CAPSULE | Refills: 0 | Status: SHIPPED | OUTPATIENT
Start: 2022-04-04 | End: 2022-05-09

## 2022-04-04 RX ORDER — HYDROXYZINE HYDROCHLORIDE 25 MG/1
25 TABLET, FILM COATED ORAL 3 TIMES DAILY PRN
Qty: 30 TABLET | Refills: 1 | Status: SHIPPED | OUTPATIENT
Start: 2022-04-04 | End: 2022-05-09

## 2022-04-04 ASSESSMENT — ANXIETY QUESTIONNAIRES
7. FEELING AFRAID AS IF SOMETHING AWFUL MIGHT HAPPEN: SEVERAL DAYS
7. FEELING AFRAID AS IF SOMETHING AWFUL MIGHT HAPPEN: SEVERAL DAYS
GAD7 TOTAL SCORE: 14
4. TROUBLE RELAXING: MORE THAN HALF THE DAYS
3. WORRYING TOO MUCH ABOUT DIFFERENT THINGS: MORE THAN HALF THE DAYS
GAD7 TOTAL SCORE: 14
2. NOT BEING ABLE TO STOP OR CONTROL WORRYING: NEARLY EVERY DAY
1. FEELING NERVOUS, ANXIOUS, OR ON EDGE: NEARLY EVERY DAY
5. BEING SO RESTLESS THAT IT IS HARD TO SIT STILL: MORE THAN HALF THE DAYS
6. BECOMING EASILY ANNOYED OR IRRITABLE: SEVERAL DAYS
GAD7 TOTAL SCORE: 14

## 2022-04-04 ASSESSMENT — PATIENT HEALTH QUESTIONNAIRE - PHQ9
SUM OF ALL RESPONSES TO PHQ QUESTIONS 1-9: 17
10. IF YOU CHECKED OFF ANY PROBLEMS, HOW DIFFICULT HAVE THESE PROBLEMS MADE IT FOR YOU TO DO YOUR WORK, TAKE CARE OF THINGS AT HOME, OR GET ALONG WITH OTHER PEOPLE: VERY DIFFICULT
SUM OF ALL RESPONSES TO PHQ QUESTIONS 1-9: 17

## 2022-04-04 ASSESSMENT — PAIN SCALES - GENERAL: PAINLEVEL: NO PAIN (0)

## 2022-04-04 NOTE — PATIENT INSTRUCTIONS
Mental Health Services  Comment: Please be aware that coverage of these services is subject to the terms and limitations of your health insurance plan.  Call member services at your health plan with any benefit or coverage questions.   [facility] will call you to coordinate your care as prescribed by your provider. If you don't hear from a representative within 2 business days, please call [phone].

## 2022-04-04 NOTE — PROGRESS NOTES
"  Assessment & Plan     Mild recurrent major depression (H)  Anxiety disorder, unspecified type  Panic attack  Increase the prozac to 40 mg daily.   She has not scheduled with counseling, she was given contact information once again for this service.   She continues to be frustrated with pain management for her chronic back pain. There was discussion of trigger point injections, but that was not carried out.   She does not currently have follow up for that.   If anxiety continues to improve, then consider addressing this again, but for now, she is not wanting to pursue this.   Trail of hydroxyzine given for panic symptoms.   - FLUoxetine (PROZAC) 40 MG capsule; Take 1 capsule (40 mg) by mouth daily  - hydrOXYzine (ATARAX) 25 MG tablet; Take 1 tablet (25 mg) by mouth 3 times daily as needed for anxiety    Migraine without status migrainosus, not intractable, unspecified migraine type  Refill given.   - amitriptyline (ELAVIL) 50 MG tablet; TAKE ONE  TABLET BY MOUTH AT BEDTIME      30 minutes spent on the date of the encounter doing chart review, patient visit and documentation        BMI:   Estimated body mass index is 26.63 kg/m  as calculated from the following:    Height as of 1/10/22: 1.702 m (5' 7\").    Weight as of this encounter: 77.1 kg (170 lb).   Weight management plan: not addressed    Depression Screening Follow Up    PHQ 4/4/2022   PHQ-9 Total Score 17   Q9: Thoughts of better off dead/self-harm past 2 weeks Several days   F/U: Thoughts of suicide or self-harm Yes   F/U: Self harm-plan Yes   F/U: Self-harm action No   F/U: Safety concerns No     Last PHQ-9 4/4/2022   1.  Little interest or pleasure in doing things 2   2.  Feeling down, depressed, or hopeless 1   3.  Trouble falling or staying asleep, or sleeping too much 3   4.  Feeling tired or having little energy 2   5.  Poor appetite or overeating 3   6.  Feeling bad about yourself 1   7.  Trouble concentrating 2   8.  Moving slowly or restless 2 "   Q9: Thoughts of better off dead/self-harm past 2 weeks 1   PHQ-9 Total Score 17   Difficulty at work, home, or with people -   In the past two weeks have you had thoughts of suicide or self harm? Yes   Do you have concerns about your personal safety or the safety of others? No   In the past 2 weeks have you thought about a plan or had intention to harm yourself? Yes   In the past 2 weeks have you acted on these thoughts in any way? No           No flowsheet data found.      Follow Up    Follow Up Actions Taken  Mental Health Referral placed    Discussed the following ways the patient can remain in a safe environment:  be around others      Return in about 1 month (around 5/4/2022) for medication check.    Kristen M. Kehr, PA-C  [facility]   Jena is a 41 year old who presents for the following health issues     Jena has had some improvement with her anxiety since starting the prozac. She is taking 20 mg daily.   She continues to have panic symptoms and will need to adjust her medication.     History of Present Illness       Mental Health Follow-up:  Patient presents to follow-up on Depression & Anxiety.Patient's depression since last visit has been:  Medium  The patient is having other symptoms associated with depression.  Patient's anxiety since last visit has been:  Bad  The patient is having other symptoms associated with anxiety.  Any significant life events: No  Patient is feeling anxious or having panic attacks.  Patient has no concerns about alcohol or drug use.       Today's PHQ-9         PHQ-9 Total Score: 17  PHQ-9 Q9 Thoughts of better off dead/self-harm past 2 weeks :   (P) Several days  Thoughts of suicide or self harm: (P) Yes  Self-harm Plan:   (P) Yes  Self-harm Action:     (P) No  Safety concerns for self or others: (P) No    How difficult have these problems made it for you to do your work, take care of things at home, or get along with other people:  Very difficult    Today's BONILLA-7 Score: 14              Review of Systems   Constitutional, HEENT, cardiovascular, pulmonary, GI, , musculoskeletal, neuro, skin, endocrine and psych systems are negative, except as otherwise noted.      Objective    /76   Pulse 90   Temp 98.4  F (36.9  C) (Tympanic)   Wt 77.1 kg (170 lb)   SpO2 100%   BMI 26.63 kg/m    Body mass index is 26.63 kg/m .  Physical Exam   GENERAL: healthy, alert and no distress  PSYCH: mentation appears normal, tearful, anxious and judgement and insight intact

## 2022-04-05 ASSESSMENT — PATIENT HEALTH QUESTIONNAIRE - PHQ9: SUM OF ALL RESPONSES TO PHQ QUESTIONS 1-9: 17

## 2022-04-05 ASSESSMENT — ANXIETY QUESTIONNAIRES: GAD7 TOTAL SCORE: 14

## 2022-05-09 ENCOUNTER — OFFICE VISIT (OUTPATIENT)
Dept: FAMILY MEDICINE | Facility: CLINIC | Age: 42
End: 2022-05-09
Payer: COMMERCIAL

## 2022-05-09 VITALS
WEIGHT: 166 LBS | DIASTOLIC BLOOD PRESSURE: 72 MMHG | SYSTOLIC BLOOD PRESSURE: 118 MMHG | OXYGEN SATURATION: 99 % | TEMPERATURE: 98.6 F | HEART RATE: 76 BPM | BODY MASS INDEX: 26 KG/M2

## 2022-05-09 DIAGNOSIS — F41.0 PANIC ATTACK: ICD-10-CM

## 2022-05-09 DIAGNOSIS — F33.0 MILD RECURRENT MAJOR DEPRESSION (H): ICD-10-CM

## 2022-05-09 PROCEDURE — 99213 OFFICE O/P EST LOW 20 MIN: CPT | Performed by: PHYSICIAN ASSISTANT

## 2022-05-09 RX ORDER — HYDROXYZINE HYDROCHLORIDE 25 MG/1
25 TABLET, FILM COATED ORAL 3 TIMES DAILY PRN
Qty: 90 TABLET | Refills: 1 | Status: SHIPPED | OUTPATIENT
Start: 2022-05-09 | End: 2022-06-28

## 2022-05-09 RX ORDER — FLUOXETINE 40 MG/1
40 CAPSULE ORAL DAILY
Qty: 90 CAPSULE | Refills: 1 | Status: SHIPPED | OUTPATIENT
Start: 2022-05-09 | End: 2022-11-07

## 2022-05-09 ASSESSMENT — PATIENT HEALTH QUESTIONNAIRE - PHQ9
SUM OF ALL RESPONSES TO PHQ QUESTIONS 1-9: 11
SUM OF ALL RESPONSES TO PHQ QUESTIONS 1-9: 11
10. IF YOU CHECKED OFF ANY PROBLEMS, HOW DIFFICULT HAVE THESE PROBLEMS MADE IT FOR YOU TO DO YOUR WORK, TAKE CARE OF THINGS AT HOME, OR GET ALONG WITH OTHER PEOPLE: SOMEWHAT DIFFICULT

## 2022-05-09 ASSESSMENT — ENCOUNTER SYMPTOMS: NERVOUS/ANXIOUS: 1

## 2022-05-09 ASSESSMENT — ANXIETY QUESTIONNAIRES
GAD7 TOTAL SCORE: 9
6. BECOMING EASILY ANNOYED OR IRRITABLE: SEVERAL DAYS
GAD7 TOTAL SCORE: 9
5. BEING SO RESTLESS THAT IT IS HARD TO SIT STILL: MORE THAN HALF THE DAYS
2. NOT BEING ABLE TO STOP OR CONTROL WORRYING: SEVERAL DAYS
GAD7 TOTAL SCORE: 9
7. FEELING AFRAID AS IF SOMETHING AWFUL MIGHT HAPPEN: SEVERAL DAYS
7. FEELING AFRAID AS IF SOMETHING AWFUL MIGHT HAPPEN: SEVERAL DAYS
4. TROUBLE RELAXING: MORE THAN HALF THE DAYS
3. WORRYING TOO MUCH ABOUT DIFFERENT THINGS: SEVERAL DAYS
8. IF YOU CHECKED OFF ANY PROBLEMS, HOW DIFFICULT HAVE THESE MADE IT FOR YOU TO DO YOUR WORK, TAKE CARE OF THINGS AT HOME, OR GET ALONG WITH OTHER PEOPLE?: SOMEWHAT DIFFICULT
1. FEELING NERVOUS, ANXIOUS, OR ON EDGE: SEVERAL DAYS

## 2022-05-09 NOTE — NURSING NOTE
"Chief Complaint   Patient presents with     Depression     Anxiety     Back Pain       Initial /72   Pulse 76   Temp 98.6  F (37  C) (Tympanic)   Wt 75.3 kg (166 lb)   SpO2 99%   BMI 26.00 kg/m   Estimated body mass index is 26 kg/m  as calculated from the following:    Height as of 1/10/22: 1.702 m (5' 7\").    Weight as of this encounter: 75.3 kg (166 lb).  Medication Reconciliation: complete    GALA Salazar MA    "

## 2022-05-09 NOTE — PROGRESS NOTES
Assessment & Plan     Mild recurrent major depression (H)  - FLUoxetine (PROZAC) 40 MG capsule; Take 1 capsule (40 mg) by mouth daily    Panic attack  - hydrOXYzine (ATARAX) 25 MG tablet; Take 1 tablet (25 mg) by mouth 3 times daily as needed for anxiety                 Return in about 6 months (around 11/9/2022) for medication check.    Kristen M. Kehr, PA-C [facility]   Jena is a 41 year old who presents for the following health issues     She increased the fluoxetine to 40 mg daily and doing well.   She has had improvement and will need refills.       Anxiety    History of Present Illness       Back Pain:  She presents for follow up of back pain. Patient's back pain is a chronic problem.  Location of back pain:  Right lower back, left lower back, right upper back, left upper back, right shoulder, left shoulder and right buttock  Description of back pain: burning, sharp, shooting and stabbing  Back pain spreads: right buttocks, right thigh and right side of neck    Since patient first noticed back pain, pain is: gradually worsening  Does back pain interfere with her job:  Yes      Mental Health Follow-up:  Patient presents to follow-up on Depression & Anxiety.Patient's depression since last visit has been:  Better  The patient is not having other symptoms associated with depression.  Patient's anxiety since last visit has been:  Better  The patient is not having other symptoms associated with anxiety.  Any significant life events: No  Patient is feeling anxious or having panic attacks.  Patient has no concerns about alcohol or drug use.       Today's PHQ-9         PHQ-9 Total Score: 11  PHQ-9 Q9 Thoughts of better off dead/self-harm past 2 weeks :   (P) Not at all    How difficult have these problems made it for you to do your work, take care of things at home, or get along with other people: Somewhat difficult    Today's BONILLA-7 Score: 9    She eats 0-1 servings of fruits  and vegetables daily.She consumes 2 sweetened beverage(s) daily.She exercises with enough effort to increase her heart rate 60 or more minutes per day.  She exercises with enough effort to increase her heart rate 5 days per week.   She is taking medications regularly.             Review of Systems   Psychiatric/Behavioral: The patient is nervous/anxious.       Constitutional, HEENT, cardiovascular, pulmonary, GI, , musculoskeletal, neuro, skin, endocrine and psych systems are negative, except as otherwise noted.      Objective    /72   Pulse 76   Temp 98.6  F (37  C) (Tympanic)   Wt 75.3 kg (166 lb)   SpO2 99%   BMI 26.00 kg/m    Body mass index is 26 kg/m .  Physical Exam   GENERAL: healthy, alert and no distress  MS: no gross musculoskeletal defects noted, no edema  SKIN: no suspicious lesions or rashes  NEURO: Normal strength and tone, mentation intact and speech normal  PSYCH: mentation appears normal, affect normal/bright

## 2022-05-10 ASSESSMENT — ANXIETY QUESTIONNAIRES: GAD7 TOTAL SCORE: 9

## 2022-05-10 ASSESSMENT — PATIENT HEALTH QUESTIONNAIRE - PHQ9: SUM OF ALL RESPONSES TO PHQ QUESTIONS 1-9: 11

## 2022-05-14 DIAGNOSIS — M79.18 MYOFASCIAL PAIN: ICD-10-CM

## 2022-05-16 NOTE — TELEPHONE ENCOUNTER
Medication refill sent to patient's pharmacy. No changes. Patient last seen 2/24/22 and has follow up 6/6/22.       Justina Cheema RN

## 2022-06-21 ENCOUNTER — TELEPHONE (OUTPATIENT)
Dept: ANESTHESIOLOGY | Facility: CLINIC | Age: 42
End: 2022-06-21
Payer: COMMERCIAL

## 2022-06-21 NOTE — TELEPHONE ENCOUNTER
LPN called and spoke to the patient.   Pt was last seen 2/24/22.     Pt stated that they are having muscle spasms in their back and they are wondering what they should do. Pt stated this has happened to them before, and it goes away after a couple of days.  LPN was directed to ask about the following: Weakness, numbness, incontinence or bowel or bladder or loss of function in lower limbs. Pt denied all concerns.     LPN suggested that the pt be evaluated as they stated they were in a lot of pain. Pt was going to reach out to their PCP or care team as they thought they would be able to get an appointment right away. Pt offered pt an appointment with the clinic, next available with Dr. Moreno. Pt stated she will call back to schedule at a later time.     Dinora Cummings LPN

## 2022-06-21 NOTE — TELEPHONE ENCOUNTER
[facility] Call Center    Phone Message    May a detailed message be left on voicemail: yes     Reason for Call: Symptoms or Concerns     If patient has red-flag symptoms, warm transfer to triage line    Current symptom or concern: Patient calling with so much pain in lower back and hips. So bad that she can't stand. She needs advice on what to do.    Symptoms have been present for: 24 hour(s)    Has patient previously been seen for this? Yes    By different clinic  Pt doesn't remember however its in the [facility] system    Date: 5-6 years ago    Are there any new or worsening symptoms? No      Action Taken: Message routed to:  [facility] Pain    Travel Screening: Not Applicable

## 2022-06-22 ENCOUNTER — TELEPHONE (OUTPATIENT)
Dept: FAMILY MEDICINE | Facility: CLINIC | Age: 42
End: 2022-06-22

## 2022-06-22 NOTE — TELEPHONE ENCOUNTER
Patient is calling, she has had lower back spasms for 3 days, she is unable to sit and stand.  She is wondering if you are able to send in an RX for her or see her or does she need to go to the ER.  She is looking for advise.  Thank you  Tyra Sy

## 2022-06-26 DIAGNOSIS — F41.0 PANIC ATTACK: ICD-10-CM

## 2022-06-26 DIAGNOSIS — M79.18 MYOFASCIAL PAIN: ICD-10-CM

## 2022-06-28 ENCOUNTER — TELEPHONE (OUTPATIENT)
Dept: ANESTHESIOLOGY | Facility: CLINIC | Age: 42
End: 2022-06-28

## 2022-06-28 RX ORDER — HYDROXYZINE HYDROCHLORIDE 25 MG/1
25 TABLET, FILM COATED ORAL 3 TIMES DAILY PRN
Qty: 30 TABLET | Refills: 1 | Status: SHIPPED | OUTPATIENT
Start: 2022-06-28 | End: 2022-11-07

## 2022-06-28 NOTE — TELEPHONE ENCOUNTER
Medication refilled, no changes and no refills added to the prescription as they were a NO SHOW for their appointment on 4/25/22.     [facility]     Message sent to clinic coordinators requesting they assist pt to schedule a follow up appointment.     Dinora Cummings LPN

## 2022-06-28 NOTE — TELEPHONE ENCOUNTER
Hakeem Mcclellan Thank you for this information, I will document this conversation.      ===View-only below this line===  ----- Message -----  From: Vy Charlton  Sent: 6/28/2022  12:50 PM CDT  To: DEEP Faulkner,    This patient is not interested in seeing Dr. Moreno or Ana Lilia Barillas.  This patient would like to speak with Bettie Mayers and set up an appointment time with her when her clinic begins in August.  (I have been told that we will be receiving a template for her schedule soon.)    In the meantime, I am wondering if this information could be documented by you and then when Bettie's template is complete she would be able to schedule an appointment with Bettie.    Please advise when you have a moment.  Patient is willing to wait to start fresh with Bettie.    Thank you,    Vy  ----- Message -----  From: Dinora Cummings LPN  Sent: 6/28/2022   8:10 AM CDT  To: Clinic Coordinators - Pain - [facility]    ----- Message from Dinora Cummings LPN sent at 6/28/2022  8:10 AM CDT -----  Please assist the pt to schedule a follow up appointment with either Dr. Moreno or ZEYAD Abraham CNP. Pt last seen 2/24/22- Pt can schedule at next available with either provider.     Thank you for your help.     Dinora Cummings LPN

## 2022-06-28 NOTE — TELEPHONE ENCOUNTER
Message sent to the clinic coordinators for assistance with scheduling the pt for a follow up.   Pt was last seen on 2/24/22.     Please assist pt to schedule a follow up appointment.     Dinora Cummings LPN

## 2022-08-10 DIAGNOSIS — M79.18 MYOFASCIAL PAIN: ICD-10-CM

## 2022-08-15 NOTE — TELEPHONE ENCOUNTER
Medication refilled, no changes.   Pt will be establishing care with Bettie Mayers 8/31/22.     Dinora Cummings LPN

## 2022-09-19 DIAGNOSIS — M79.18 MYOFASCIAL PAIN: ICD-10-CM

## 2022-09-19 NOTE — TELEPHONE ENCOUNTER
LPN reviewed chart. Pt needs to set up an appointment to establish care with provider. Pt NO SHOWED for their appointment on 8/31/22 to establish care with ZEYAD Messer CNP.

## 2022-09-27 ENCOUNTER — TELEPHONE (OUTPATIENT)
Dept: FAMILY MEDICINE | Facility: CLINIC | Age: 42
End: 2022-09-27

## 2022-09-27 NOTE — TELEPHONE ENCOUNTER
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    Patient has a fever, strep and feels ill.    No openings today, advised patient to go to urgent care to be tested for strep.    Bridgette Villegas RN  [facility]

## 2022-10-17 ENCOUNTER — TELEPHONE (OUTPATIENT)
Dept: PALLIATIVE MEDICINE | Facility: CLINIC | Age: 42
End: 2022-10-17

## 2022-10-17 NOTE — TELEPHONE ENCOUNTER
"[facility] Call Center    Phone Message    May a detailed message be left on voicemail: yes     Reason for Call: Other: Patient called requesting to schedule with Dr. Mayers who she has not seen before due to cancelling her previous appointments. Per protocol patient is to be scheduled as new, writer uncertain as to how patient is to be scheduled and with whom. Patient requests being seen in [place] and stated that she is incompatible with Dr Moreno, patient stated that she \"would rather be in pain than see Dr Moreno\". Patient will be needing medication refills soon and is concerned about being seeing soon. Please call patient back to discuss.    Action Taken: Message routed to:  [facility]  Pain    Travel Screening: Not Applicable                                                                      "

## 2022-11-07 ENCOUNTER — OFFICE VISIT (OUTPATIENT)
Dept: FAMILY MEDICINE | Facility: CLINIC | Age: 42
End: 2022-11-07
Payer: COMMERCIAL

## 2022-11-07 ENCOUNTER — OFFICE VISIT (OUTPATIENT)
Dept: BEHAVIORAL HEALTH | Facility: CLINIC | Age: 42
End: 2022-11-07

## 2022-11-07 VITALS
RESPIRATION RATE: 18 BRPM | TEMPERATURE: 98 F | WEIGHT: 157 LBS | DIASTOLIC BLOOD PRESSURE: 75 MMHG | BODY MASS INDEX: 24.64 KG/M2 | HEIGHT: 67 IN | HEART RATE: 82 BPM | SYSTOLIC BLOOD PRESSURE: 110 MMHG

## 2022-11-07 DIAGNOSIS — F41.9 ANXIETY DISORDER, UNSPECIFIED TYPE: ICD-10-CM

## 2022-11-07 DIAGNOSIS — Z23 NEED FOR PROPHYLACTIC VACCINATION AND INOCULATION AGAINST INFLUENZA: ICD-10-CM

## 2022-11-07 DIAGNOSIS — Z03.89 NO DIAGNOSIS ON AXIS I: Primary | ICD-10-CM

## 2022-11-07 DIAGNOSIS — F33.0 MILD RECURRENT MAJOR DEPRESSION (H): Primary | ICD-10-CM

## 2022-11-07 DIAGNOSIS — F41.0 PANIC ATTACK: ICD-10-CM

## 2022-11-07 PROCEDURE — 90471 IMMUNIZATION ADMIN: CPT | Performed by: PHYSICIAN ASSISTANT

## 2022-11-07 PROCEDURE — 96127 BRIEF EMOTIONAL/BEHAV ASSMT: CPT | Performed by: PHYSICIAN ASSISTANT

## 2022-11-07 PROCEDURE — 90686 IIV4 VACC NO PRSV 0.5 ML IM: CPT | Performed by: PHYSICIAN ASSISTANT

## 2022-11-07 PROCEDURE — 99214 OFFICE O/P EST MOD 30 MIN: CPT | Mod: 25 | Performed by: PHYSICIAN ASSISTANT

## 2022-11-07 RX ORDER — HYDROXYZINE HYDROCHLORIDE 25 MG/1
25 TABLET, FILM COATED ORAL 3 TIMES DAILY PRN
Qty: 60 TABLET | Refills: 1 | Status: SHIPPED | OUTPATIENT
Start: 2022-11-07 | End: 2023-03-16

## 2022-11-07 ASSESSMENT — COLUMBIA-SUICIDE SEVERITY RATING SCALE - C-SSRS
1. WITHIN THE PAST MONTH, HAVE YOU WISHED YOU WERE DEAD OR WISHED YOU COULD GO TO SLEEP AND NOT WAKE UP?: YES
2. IN THE PAST MONTH, HAVE YOU ACTUALLY HAD ANY THOUGHTS OF KILLING YOURSELF?: NO
6. HAVE YOU EVER DONE ANYTHING, STARTED TO DO ANYTHING, OR PREPARED TO DO ANYTHING TO END YOUR LIFE?: NO

## 2022-11-07 ASSESSMENT — ANXIETY QUESTIONNAIRES
IF YOU CHECKED OFF ANY PROBLEMS ON THIS QUESTIONNAIRE, HOW DIFFICULT HAVE THESE PROBLEMS MADE IT FOR YOU TO DO YOUR WORK, TAKE CARE OF THINGS AT HOME, OR GET ALONG WITH OTHER PEOPLE: VERY DIFFICULT
7. FEELING AFRAID AS IF SOMETHING AWFUL MIGHT HAPPEN: MORE THAN HALF THE DAYS
3. WORRYING TOO MUCH ABOUT DIFFERENT THINGS: MORE THAN HALF THE DAYS
2. NOT BEING ABLE TO STOP OR CONTROL WORRYING: MORE THAN HALF THE DAYS
8. IF YOU CHECKED OFF ANY PROBLEMS, HOW DIFFICULT HAVE THESE MADE IT FOR YOU TO DO YOUR WORK, TAKE CARE OF THINGS AT HOME, OR GET ALONG WITH OTHER PEOPLE?: VERY DIFFICULT
GAD7 TOTAL SCORE: 14
5. BEING SO RESTLESS THAT IT IS HARD TO SIT STILL: MORE THAN HALF THE DAYS
1. FEELING NERVOUS, ANXIOUS, OR ON EDGE: MORE THAN HALF THE DAYS
4. TROUBLE RELAXING: MORE THAN HALF THE DAYS
6. BECOMING EASILY ANNOYED OR IRRITABLE: MORE THAN HALF THE DAYS
GAD7 TOTAL SCORE: 14
GAD7 TOTAL SCORE: 14
7. FEELING AFRAID AS IF SOMETHING AWFUL MIGHT HAPPEN: MORE THAN HALF THE DAYS

## 2022-11-07 ASSESSMENT — PATIENT HEALTH QUESTIONNAIRE - PHQ9
SUM OF ALL RESPONSES TO PHQ QUESTIONS 1-9: 18
10. IF YOU CHECKED OFF ANY PROBLEMS, HOW DIFFICULT HAVE THESE PROBLEMS MADE IT FOR YOU TO DO YOUR WORK, TAKE CARE OF THINGS AT HOME, OR GET ALONG WITH OTHER PEOPLE: VERY DIFFICULT
10. IF YOU CHECKED OFF ANY PROBLEMS, HOW DIFFICULT HAVE THESE PROBLEMS MADE IT FOR YOU TO DO YOUR WORK, TAKE CARE OF THINGS AT HOME, OR GET ALONG WITH OTHER PEOPLE: EXTREMELY DIFFICULT
SUM OF ALL RESPONSES TO PHQ QUESTIONS 1-9: 18
SUM OF ALL RESPONSES TO PHQ QUESTIONS 1-9: 16
SUM OF ALL RESPONSES TO PHQ QUESTIONS 1-9: 16

## 2022-11-07 ASSESSMENT — PAIN SCALES - GENERAL: PAINLEVEL: NO PAIN (0)

## 2022-11-07 NOTE — PROGRESS NOTES
Patient had appointment with his/her primary care physician. [facility] services were requested. No immediate concerns for risk or safety identified. Explained the role of the [facility] and ways to schedule if interested. Scheduled for tomorrow at 9A in person.    RAYNE Cazares, [facility]  Behavioral Health Clinician  [facility]  [address] [place] [address]  [phone]

## 2022-11-07 NOTE — PROGRESS NOTES
Assessment & Plan     Mild recurrent major depression (H)  Panic attack  Anxiety disorder, unspecified type  Increase the dose of the prozac to 60 mg daily.   Refill of the hydroxyzine also given.   She was able to meet with Summer Tran today also from Behavioral Health. Plan to schedule counseling to bridge until she can establish with a long term counselor.   I will see her back in 1 month, sooner if needed.   - FLUoxetine (PROZAC) 20 MG capsule; Take 3 capsules (60 mg) by mouth daily  - hydrOXYzine (ATARAX) 25 MG tablet; Take 1 tablet (25 mg) by mouth 3 times daily as needed for anxiety    Need for prophylactic vaccination and inoculation against influenza          30 minutes spent on the date of the encounter doing chart review, review of outside records, review of test results, interpretation of tests, patient visit and documentation        Depression Screening Follow Up    PHQ 11/7/2022   PHQ-9 Total Score 16   Q9: Thoughts of better off dead/self-harm past 2 weeks Several days   F/U: Thoughts of suicide or self-harm Yes   F/U: Self harm-plan No   F/U: Self-harm action No   F/U: Safety concerns No     Last PHQ-9 11/7/2022   1.  Little interest or pleasure in doing things 1   2.  Feeling down, depressed, or hopeless 1   3.  Trouble falling or staying asleep, or sleeping too much 2   4.  Feeling tired or having little energy 1   5.  Poor appetite or overeating 3   6.  Feeling bad about yourself 3   7.  Trouble concentrating 3   8.  Moving slowly or restless 1   Q9: Thoughts of better off dead/self-harm past 2 weeks 1   PHQ-9 Total Score 16   Difficulty at work, home, or with people -   In the past two weeks have you had thoughts of suicide or self harm? Yes   Do you have concerns about your personal safety or the safety of others? No   In the past 2 weeks have you thought about a plan or had intention to harm yourself? No   In the past 2 weeks have you acted on these thoughts in any way? No         [facility]  ([facility]) 11/7/2022   Within the last month, have you wished you were dead or wished you could go to sleep and not wake up? Yes   Within the last month, have you had any actual thoughts of killing yourself? No   Within the last month, have you ever done anything, started to do anything, or prepared to do anything to end your life? No       Follow Up  Warm handout to [facility]    Follow Up Actions Taken  warm handout to [facility]    Discussed the following ways the patient can remain in a safe environment:  be around others      Return in about 1 month (around 12/7/2022) for recheck.    Kristen M. Kehr, PA-C  [facility]   Jena is a 41 year old, presenting for the following health issues:    Jena has been having difficulty with anxiety over the past 5 months. Increase in symptoms due to increase in stress. She has been working 60 hours / week. 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 The increase in stress has been suppressing her appetite. Her older son who moved out of her home this spring always made sure she was eating. She does make food for her younger 12 year old son. She started to try to eat small meals, but is not able to be consistent and does not feel well after she eats.     She has lost 40 pounds in the past 5 months.   She has noticed that her left ankle is larger than the right. She has concerns about varicose veins. She also broke her left ankle in the past.     History of Present Illness       Mental Health Follow-up:  Patient presents to follow-up on Depression & Anxiety.Patient's depression since last visit has been:  Medium  The patient is not having other symptoms associated with depression.  Patient's anxiety since last visit has been:  Medium  The patient is not having other symptoms associated with anxiety.  Any significant life events: job concerns  Patient is feeling anxious or having panic attacks.  Patient has no concerns  "about alcohol or drug use.    Reason for visit:  Veins in legs and swollen, healthy eating    She eats 0-1 servings of fruits and vegetables daily.She consumes 4 sweetened beverage(s) daily.She exercises with enough effort to increase her heart rate 30 to 60 minutes per day.  She exercises with enough effort to increase her heart rate 5 days per week.   She is taking medications regularly.    Today's PHQ-9         PHQ-9 Total Score: 18    PHQ-9 Q9 Thoughts of better off dead/self-harm past 2 weeks :   Several days  Thoughts of suicide or self harm: (P) Yes  Self-harm Plan:   (P) No  Self-harm Action:     (P) No  Safety concerns for self or others: (P) No    How difficult have these problems made it for you to do your work, take care of things at home, or get along with other people: Very difficult  Today's BONILLA-7 Score: 14         Discuss veins on both leg, left leg vein swollen and discuss weight loss.         Review of Systems   Constitutional, HEENT, cardiovascular, pulmonary, GI, , musculoskeletal, neuro, skin, endocrine and psych systems are negative, except as otherwise noted.      Objective    /75   Pulse 82   Temp 98  F (36.7  C) (Tympanic)   Resp 18   Ht 1.702 m (5' 7\")   Wt 71.2 kg (157 lb)   BMI 24.59 kg/m    Body mass index is 24.59 kg/m .  Physical Exam   GENERAL: healthy, alert and no distress  MS: there is a slight increase in diameter of the left ankle. More varicose veins present in this leg, but no edema. No tenderness in her calf. Nancy's negative.   SKIN: no suspicious lesions or rashes  NEURO: Normal strength and tone, mentation intact and speech normal  PSYCH: mentation appears normal, tearful, anxious, judgement and insight intact and appearance well groomed                    "

## 2022-11-08 ENCOUNTER — OFFICE VISIT (OUTPATIENT)
Dept: BEHAVIORAL HEALTH | Facility: CLINIC | Age: 42
End: 2022-11-08
Payer: COMMERCIAL

## 2022-11-08 ENCOUNTER — OFFICE VISIT (OUTPATIENT)
Dept: OPTOMETRY | Facility: CLINIC | Age: 42
End: 2022-11-08
Payer: COMMERCIAL

## 2022-11-08 DIAGNOSIS — H52.223 REGULAR ASTIGMATISM OF BOTH EYES: ICD-10-CM

## 2022-11-08 DIAGNOSIS — F33.2 SEVERE EPISODE OF RECURRENT MAJOR DEPRESSIVE DISORDER, WITHOUT PSYCHOTIC FEATURES (H): Primary | ICD-10-CM

## 2022-11-08 DIAGNOSIS — F41.1 GENERALIZED ANXIETY DISORDER: ICD-10-CM

## 2022-11-08 DIAGNOSIS — Z01.00 ROUTINE EYE EXAM: Primary | ICD-10-CM

## 2022-11-08 DIAGNOSIS — F43.9 TRAUMA AND STRESSOR-RELATED DISORDER: ICD-10-CM

## 2022-11-08 DIAGNOSIS — H52.4 PRESBYOPIA: ICD-10-CM

## 2022-11-08 PROCEDURE — 92015 DETERMINE REFRACTIVE STATE: CPT | Performed by: OPTOMETRIST

## 2022-11-08 PROCEDURE — 90791 PSYCH DIAGNOSTIC EVALUATION: CPT

## 2022-11-08 PROCEDURE — 92004 COMPRE OPH EXAM NEW PT 1/>: CPT | Performed by: OPTOMETRIST

## 2022-11-08 ASSESSMENT — CONF VISUAL FIELD
OS_INFERIOR_TEMPORAL_RESTRICTION: 0
OD_INFERIOR_NASAL_RESTRICTION: 0
METHOD: COUNTING FINGERS
OS_INFERIOR_NASAL_RESTRICTION: 0
OS_NORMAL: 1
OD_SUPERIOR_NASAL_RESTRICTION: 0
OD_SUPERIOR_TEMPORAL_RESTRICTION: 0
OS_SUPERIOR_TEMPORAL_RESTRICTION: 0
OD_INFERIOR_TEMPORAL_RESTRICTION: 0
OD_NORMAL: 1
OS_SUPERIOR_NASAL_RESTRICTION: 0

## 2022-11-08 ASSESSMENT — COLUMBIA-SUICIDE SEVERITY RATING SCALE - C-SSRS
TOTAL  NUMBER OF ABORTED OR SELF INTERRUPTED ATTEMPTS LIFETIME: NO
5. HAVE YOU STARTED TO WORK OUT OR WORKED OUT THE DETAILS OF HOW TO KILL YOURSELF? DO YOU INTEND TO CARRY OUT THIS PLAN?: YES
2. HAVE YOU ACTUALLY HAD ANY THOUGHTS OF KILLING YOURSELF?: YES
4. HAVE YOU HAD THESE THOUGHTS AND HAD SOME INTENTION OF ACTING ON THEM?: NO
3. HAVE YOU BEEN THINKING ABOUT HOW YOU MIGHT KILL YOURSELF?: YES
1. IN THE PAST MONTH, HAVE YOU WISHED YOU WERE DEAD OR WISHED YOU COULD GO TO SLEEP AND NOT WAKE UP?: YES
6. HAVE YOU EVER DONE ANYTHING, STARTED TO DO ANYTHING, OR PREPARED TO DO ANYTHING TO END YOUR LIFE?: NO
6. HAVE YOU EVER DONE ANYTHING, STARTED TO DO ANYTHING, OR PREPARED TO DO ANYTHING TO END YOUR LIFE?: YES
TOTAL  NUMBER OF INTERRUPTED ATTEMPTS LIFETIME: NO
REASONS FOR IDEATION PAST MONTH: COMPLETELY TO END OR STOP THE PAIN (YOU COULDN'T GO ON LIVING WITH THE PAIN OR HOW YOU WERE FEELING)
TOTAL  NUMBER OF ACTUAL ATTEMPTS LIFETIME: 3
ATTEMPT PAST THREE MONTHS: NO
1. HAVE YOU WISHED YOU WERE DEAD OR WISHED YOU COULD GO TO SLEEP AND NOT WAKE UP?: YES
LETHALITY/MEDICAL DAMAGE CODE MOST RECENT ACTUAL ATTEMPT: NO PHYSICAL DAMAGE OR VERY MINOR PHYSICAL DAMAGE
4. HAVE YOU HAD THESE THOUGHTS AND HAD SOME INTENTION OF ACTING ON THEM?: YES
5. HAVE YOU STARTED TO WORK OUT OR WORKED OUT THE DETAILS OF HOW TO KILL YOURSELF? DO YOU INTEND TO CARRY OUT THIS PLAN?: NO
ATTEMPT LIFETIME: YES
REASONS FOR IDEATION LIFETIME: COMPLETELY TO END OR STOP THE PAIN (YOU COULDN'T GO ON LIVING WITH THE PAIN OR HOW YOU WERE FEELING)
2. HAVE YOU ACTUALLY HAD ANY THOUGHTS OF KILLING YOURSELF?: YES
MOST RECENT DATE: 57709

## 2022-11-08 ASSESSMENT — KERATOMETRY
OS_AXISANGLE2_DEGREES: 173
OS_K1POWER_DIOPTERS: 44.25
OS_K2POWER_DIOPTERS: 43.25
OD_K1POWER_DIOPTERS: 44.50
OD_AXISANGLE2_DEGREES: 11
OD_K2POWER_DIOPTERS: 42.75

## 2022-11-08 ASSESSMENT — REFRACTION_WEARINGRX
OD_CYLINDER: +2.50
OS_AXIS: 178
OD_AXIS: 010
OD_SPHERE: -2.25
OS_CYLINDER: +2.25
SPECS_TYPE: SVL
OS_SPHERE: -2.00

## 2022-11-08 ASSESSMENT — TONOMETRY
OS_IOP_MMHG: 14
OD_IOP_MMHG: 14
IOP_METHOD: APPLANATION

## 2022-11-08 ASSESSMENT — REFRACTION_MANIFEST
OS_CYLINDER: +1.75
OS_SPHERE: -2.00
OS_AXIS: 003
OD_AXIS: 006
OS_ADD: +1.50
OS_AXIS: 178
OS_CYLINDER: +2.25
OD_CYLINDER: +3.25
OD_CYLINDER: +3.25
OD_AXIS: 010
OD_SPHERE: -2.50
OD_ADD: +1.50
OS_SPHERE: -1.25
OD_SPHERE: -2.50

## 2022-11-08 ASSESSMENT — VISUAL ACUITY
OS_SC: 20/30-1
OD_CC+: -1
OD_CC: 20/20
CORRECTION_TYPE: GLASSES
METHOD: SNELLEN - LINEAR
OD_CC: 20/70
OS_CC+: -1
OS_CC: 20/50
OD_SC: 20/50
OS_CC: 20/20

## 2022-11-08 ASSESSMENT — SLIT LAMP EXAM - LIDS
COMMENTS: NORMAL
COMMENTS: NORMAL

## 2022-11-08 ASSESSMENT — CUP TO DISC RATIO
OS_RATIO: 0.2
OD_RATIO: 0.2

## 2022-11-08 NOTE — PATIENT INSTRUCTIONS
"    [facility]                                       Patient's Name: Jena Pinedo  11/8/2022     SAFETY PLAN:    If I am feeling unsafe or I am in a crisis, I will:   Contact my established care providers   Call the [place] Suicide Prevention Lifeline: [phone]   Go to the nearest emergency room   Call 911     Step 1: Warning signs / cues (Thoughts, images, mood, situation, behavior) that a crisis may be developing:  Thoughts: \"I don't matter\", \"People would be better off without me\", \"I'm a burden\", \"I can't do this anymore\", \"I just want this to end\" and \"Nothing makes it better\"  Images: obsessive thoughts of death or dying:  , flashbacks and visions of harm:    Thinking Processes: ruminations (can't stop thinking about my problems):  , racing thoughts, intrusive thoughts (bothersome, unwanted thoughts that come out of nowhere):  , highly critical and negative thoughts:   and disorganized thinking: scattered thinking, head gets really loud  Mood: worsening depression, hopelessness, helplessness, intense anger, intense worry, agitation, disinhibited (not caring about things or consequences), mood swings and detach and isolate more  Behaviors: isolating/withdrawing , using alcohol, can't stop crying, impulsive, reckless behaviors (acting without thinking):  , aggression, not taking care of myself, sleeping too much and not sleeping enough  Situations: pain, relationship problems, trauma  and financial stress     -Isolation, loss of appetite, increase in depression, loss of interest in preferred activities, suicidal thoughts, suicide attempt, excessive or uncontrollable crying; increased aggression; self injurious behavior    Step 2: Coping strategies - Things I can do to take my mind off of my problems without contacting another person (relaxation technique, physical activity):  Distress Tolerance Strategies:  play with my pet , listen to positive and upbeat music:  , change " "body temperature (ice pack/cold water)  and    Physical Activities: go for a walk  Focus on helpful thoughts:  \"This is temporary\", \"I will get through this\", \"It always passes\", \"Ride the wave\", think about happy memories:  , remind myself of what is important to me:   and self-compassion statements:      Things I am able to do on my own to cope or help me feel better:   Grounding Techniques:  Try to notice where you are, your surroundings including the people, the sounds like the TV or radio.  Concentrate on your breathing. Take a deep cleansing breath from your diaphragm. Count the breaths as you exhale. Make sure you breath slowly.  Hold something that you find comforting, for some it may be a stuffed animal or a blanket. Notice how it feels in your hands. Is it hard or soft?  During a non-crisis time make a list of positive affirmations. Print them out and keep them handy for times of intense anxiety. At those times, read them aloud.  Try the Quat-E game:  Name 5 things you can see in the room with you  Name 4 things you can feel ( chair on my back  or  feet on floor )   Name 3 things you can hear right now ( people talking  or  tv )   Name 2 things you can smell right now (or, 2 things you like the smell of)   Name 1 good thing about yourself  Create A Safe Place  Image a safe place -- it can be a real or imaginary place:   What do you see -- especially colors?   What sounds do you hear?   What sensations do you feel?   What smells do you smell?   What people or animals would you want in your safe place?   Imagine a protective bubble, wall or boundary around your safe place.   Imagine a door or gate with a guard at your safe place.   Image a lock and key to your safe place and only you can unlock it.  You can draw or make a collage that represents your safe place.   Choose a souvenir of your safe place -- a color, an object, a song.   Keep your image of your safe place so you can come back to it when you need " to.    -Reach out to therapist and other mental health providers, reach out to family members and family friends, identify what makes life worth living; distracting strategies, reach out to crisis lines    Things I can use or do for distraction:   Sing in the shower; take a long walk; count your blessings; read a poem; think about your pet; take a deep breath; let out a big sigh; Buy yourself flowers; Jump rope; Make a  to do  list; Count to 10; Keep a diary; Journal daily; Plant flowers; Do a good deed; Set flexible deadlines; Call a friend; Listen hard; Believe in yourself; Read a good book; See a live play; Write to a friend; Forgive and forget; Eat by candlelight; Go to a ball game; Take a bubble bath; End a bad habit; Listen to music; Share what you have; Don t drink and drive; Be in the moment; Set realistic goals ; Eat right ; Hug a friend; Say something nice; Whistle a tune; Stretch a lot; Watch your weight; Walk instead of drive; Sleep late on weekends; Read the comics; Focus on your task; Don t take drugs; Make a gratitude list; Laugh a lot; Be kind to others; Cry when you can; Praise others; Play with your sibling; Set priorities; Reward yourself often; Massage tense muscles; Do the hard tasks first; Take a long bike ride; Smell a flower; Take a personal day; Draw a picture; Avoid negative people; Dance by yourself; Memorize a new song; Start a new hobby; Ask for a hug ; Think positive thoughts; Do volunteer work; Go to bed an hour early; Talk less; Don t procrastinate; Avoid the small stuff; Don t eat junk food; Start a support group; Call your grandparents; Meditate each day; Remember your successes; Get medical checkups; Turn off the noise; Clean up the clutter; Find the silver lining; Let others be themselves; Walk in the rain; Donate to [employer]; Exercise 20 minutes a day; Start school work early; Set daily goals for yourself; Visualize a peaceful scene; Schedule play time each day; Give the benefit  "of the doubt  ; See problems as opportunities; Budget your time and money; Make a duplicate set of keys; Break big jobs into small tasks; Teach someone something new; Emphasize your strengths ; Admit you don t know it all; Hum your favorite tune; Take care of your own needs; Remember feelings are not facts; Remember:  this too shall pass;\" Drink a glass of water before bedtime; Change your route to work/school; Develop alternative plans; Avoid seeing, listening to; Reading bad news    Changes I can make to support my mental health and wellness: attend all scheduled mental health appointments; get enough/good sleep; take medications as prescribed; eat a healthy diet; get enough exercise; identify consistent relaxation strategies; develop a routine    Step 3: People and social settings that provide distraction:              Name:  Mandie                        Name:   Mom                         Name:    sister Katie            being with neighbors - [facility]     Step 4: Remind myself of people and things that are important to me and worth living for: children    Step 5: When I am in crisis, I can ask these people to help me use my safety plan:               Name:   Mandie                                  Name:   Mom                         Name:    Sister Katie             -Outpatient treatment team; family; friends; crisis lines    Step 6: Making the environment safe:   remove alcohol, be around others and pt will take meds together with BF and remain in one place, BF ensures they remain there    Step 7: Professionals or agencies I can contact during a crisis: local ED, [facility], crisis lines     Crisis Lines     1) Your [facility] has a mental health crisis team you can call 24/7: [facility] [place]  [phone]     Crisis Text Line  Text 840367  You will be connected with a trained live crisis counselor to provide support.  Por espanol, texto  SANTHOSH a 448344 o texto a 442-AYUDAME en WhatsA  National Hope " "Line  1.800.SUICIDE [6007004]  Crisis Intervention: 114.276.3640 or 255-365-8179 (TTY: 795.403.3208).  Call anytime for help.  Suicide Awareness Voices of Education (SAVE) (www.save.org): 888-511-SAVE (7283)  Text 4 Life: txt \"LIFE\" to 34816 for immediate support and crisis intervention     2) New national suicide and crisis line is now live -- just dial 988     If you or someone you know is struggling or in crisis, help is available. Call or text 988 or chat redBus.in (copy and paste into your browser). People can call or text Yesmail8 or chat [employer] Networksorg for themselves OR if they are worried about a loved one who may need crisis support. 988 serves as a universal entry point so that no matter where you live in the [place], you can reach a trained crisis counselor who can help.     About 988  988 offers 24/7 access to trained crisis counselors who can help people experiencing mental health-related distress. That could be:  Thoughts of suicide  Mental health or substance use crisis, or  Any other kind of emotion distress     How is 988 different from 911?  988 was established to improve access to crisis services in a way that meets our country s growing suicide and mental health related crisis care needs. 988 will provide easier access to the Lifeline network and related crisis resources, which are distinct from 911 (where the focus is on dispatching Emergency Medical Services, fire and police as needed).      3) Community Resources     Fast Tracker  Linking people to mental health and substance use disorder resources  fasttrackermn.org   [place] Mental Health Warm Line  Peer to peer support  Monday thru Saturday, 12 pm to 10 pm  462.813.7998 or 7.778.469.0715  Text \"Support\" to 97948  National [place] on Mental Illness (ALYSSIA)  165.611.7731 or 1.888.ALYSSIA.HELPS     4) Mental Health Apps     My3  https://myvendome 1699pp.org/  VirtualHopeBox  https://Cyzone.Scary Mommy/apps/virtual-hope-box/    Suicide " Prevention Apps    Suicide is a leading cause of death among Americans, tragically taking over 45,000 lives per year according to the CDC. While we're not suggesting an jewel alone can save lives, they can be a good resource to go along with counseling and mental health lifelines, like the National Suicide Prevention Lifeline, 450.325.5807, and Brian Lifeline, 499.541.8499.    MY3    Designed to help those stay safe while having thoughts of suicide, MY3 is free and lets you customize your own personal safety plan by noting your warning signs, listing coping strategies, and connecting you to helpful resources to reach out to when you need them most. At your fingertips is a button that puts you in direct contact (24 hours a day, 7 days a week) with a trained counselor from the National Suicide Prevention Lifeline as well as a 911 alert. Additionally, you can choose three people to contact in the event you're having thoughts of suicide. (Free; iOS and Android)    Moises     notOK is a free jewel developed by a struggling teenager (and her teen brother) for teenagers. The jewel features a large, red button that can be activated to let close friends, family and their support network know help is needed. Users can add up to five trusted contacts as part of their support group so when they hit the digital panic button, a message along with their current GPS location is sent to their contacts. The message reads:  Hey, I'm not OK! Please call, text, or come find me.  (Free; iOS and Android)    General Mental Health Apps    With the state of the world set to crazy mode right now, who isn't having feelings? Whatever your emotion--stress, anger, angst, depression--a little extra help dealing is something we could all use, amiright? These apps are like a little pocket therapy (not to be replaced by actual therapy) that provide approachable, easy-to-use (and sometimes fun) ways to manage every mood, help change unhealthy thought  patterns, and give you effective strategies to stay grounded when life feels out of control.    What's Up     What's up is an amazing free jewel that uses Cognitive Behavioral Therapy (CBT) and Acceptance Commitment Therapy (ACT) methods to help you cope with Depression, Anxiety, Stress, and more. Use the positive and negative habit tracker to maintain your good habits, and break those that are counterproductive. We particularly love the  Get Grounded  page, which contains over 100 different questions to pinpoint what you're feeling, and the  Thinking Patterns  page, which teaches you how to stop negative internal monologues. Try it out for yourself. (Free; iOS and Android)    Mood Kit     MoodKit uses the foundation of Cognitive Behavioral Therapy (CBT) and provides users with over 200 different mood improvement activities. Developed by two clinical psychologists, MoodKit helps you learn how to change how you think, and develop self-awareness and healthy attitudes. The journal feature is a great way to practice self-care by reflecting on the day, noting any distressing thoughts, and documenting how you overcame them. ($4.99; Clew)    Addiction Apps    Addiction is a serious disease that affects too many people--and the facts are staggering: According to the CDC, more than 750,000 Americans  from drug overdoses from 1999 to 2018 and an estimated 88,000 people die from alcohol-related causes annually. Yet, with the proper counseling and dedicated rehab programs, addiction can be treatable. While apps are not a replacement for in-person help, they can provide people suffering with recovery resources at the palm of their hands to help track sobriety, monitor triggering behaviors, and give instant access to support. If you are experiencing an addiction and need help, contact the Substance Abuse and Mental Health Services Administration's (SAMSA) National Helpline at 1-193.230.8128.  Twenty-Four Hours a Day     Based on  the best-selling book of the same name, Twenty-Four Hours a Day offers 366 meditations from the book, making it easier for people in recovery from addiction to focus on sobriety wherever they are. (Free iOS and Android)    Quit That! - Habit Tracker     Quit That! is a completely free jewel that helps users beat their habits or addictions. Whether you're looking to stop drinking alcohol, quit smoking, or stop taking drugs, it's the perfect recovery tool to track and monitor your progress. Track as many vices as you want and find out how many minutes, hours, days, weeks, or years it's been since you quit. (Free; iOS)    Anxiety Apps    Those with chronic anxiety know the feeling: The angst is always there--lurking around like a stage-five clinger. It's the kind of condition that, for the 40 million adults in the [place] age 18 and older who have an anxiety disorder, can be all-consuming when left to its own devices. But anxiety can also be manageable once you learn how to work through all that worry. Seeking help from a mental health professional is the best way to manage anxiety, but, the following apps are great tools to use along the way--like reminding you to focus on your breathing to get out of a vicious thought cycle.    MindShift     MindShift is one of the best mental health apps designed specifically for teens and young adults with anxiety. Rather than trying to avoid anxious feelings, Mind Shift stresses the importance of changing how you think about anxiety. Think of this jewel as the cheerleader in your pocket, encouraging you to take charge of your life, ride out intense emotions, and face challenging situations. (Free; iOS and Android)    Self-Help for Anxiety Management (LETHA)     LETHA might be perfect for you if you're interested in self-help, but meditation isn't your thing. Users are prompted to build their own 24-hour anxiety toolkit that allows you to track anxious thoughts and behavior over  time, and learn 25 different self-help techniques. You can also use WebSideStory's  Social Cloud  feature to confidentially connect with other users in an online community for additional support. (Free; iOS and Android)    CBT Thought Record Diary     The centerpiece of cognitive-behavioral therapy is changing your emotions by identifying negative and distorted thinking patterns. You can use CBT Thought Record Diary to document negative emotions, analyze flaws in your thinking, and reevaluate your thoughts. This is a great jewel for gradually changing your approach to anxiety-inducing situations and your thinking patterns for future situations. (Free; iOS and Android)    Depression Apps    If you have depression, life can seem like a giant pit of quicksand that's constantly pulling you under with no way out. Let's just say, it's a heavy state of being. And it's also one of the most common mental health conditions, affecting about 350 million people. If left alone, depression can continue to linger and linger, taking a toll on your quality of life. But there is a bright side: It's treatable. Seeking help from a mental health professional is the first step. And for those in therapy, there are also some good apps that can do everything from helping to boost your mood to connecting you with a trained professional who can offer virtual counseling. If you are struggling or in crisis, call the National Suicide Prevention Lifeline 0-576-390-FQEK (6502).    Happify     Need a happy fix? With its psychologist-approved mood-training program, the Backchat jewel is your fast-track to a good mood. Try various engaging games, activity suggestions, gratitude prompts and more to train your brain as if it were a muscle, to overcome negative thoughts. The best part? Its free!  (Free; iOS and Android)    MoodTools     MoodTools aims to support people with clinical depression by aiding the path to recovery. Discover helpful videos that can improve  your mood and behavior, log and analyze your thoughts using Cognitive Behavioral Therapy (CBT) principles, develop a suicide safety plan and more with this free antoinette. (Free; iOS and Android)    Mindfulness and Meditation Apps    From guided meditations, breathing exercises and videos to stories and soothing music, mindfulness and meditation apps are basically the answer to your angsty prayers. Experts believe regular meditation can actually change aspects of brain functioning. And for long-term changes, studies show that it takes about eight weeks of practice to make a real difference. Whether you have five minutes or an entire afternoon, these apps are guaranteed to create a sense of calm in your anxious brain--and all from the comfort of your couch. Namshahlae.    Headspace     The Headspace antoinette makes meditation simple. Learn the skills of mindfulness and meditation by using this antoinette for just a few minutes per day. You gain access to hundreds of meditations on everything from stress and anxiety to sleep and focus. The antoinette also has a handy  get some headspace  reminder to encourage you to keep practicing each day.  ($12.99/Month or $9.99/Year for students; iOS and Android)    Calm     Named by Apple as the 2017 ServiceRelated Antoinette of the Year, Calm is quickly becoming regarded as one of the best mental health apps available. Calm provides people experiencing stress and anxiety with guided meditations, sleep stories, breathing programs, and relaxing music. This antoinette is truly universal; whether you've never tried meditation before or regularly practice, you'll find the perfect program for you. ($12.99/Month; iOS and Android)    Ten Percent Happier     Want to sleep better, find relaxation, be more mindful and, well, ten percent happier? This is the antoinette for you. Ten Percent Happier has a library of 500+ guided meditations on topics ranging from anxiety and stress to parenting and sleep, as well as videos, bite-sized stories, and  inspiration you can listen to on the go. New content is added weekly so you'll never tire of having to do the same meditative practice again and again.  ($12.99/Month; iOS and Android)       I helped develop this safety plan and agree to use it when needed.  I have been given a copy of this plan.       Patient signature: _______________________________________________________________     Today s date:  2022     Adapted from Safety Plan Template 2008 Leigh Ann Rosales and Nima Lugo is reprinted with the express permission of the authors.  No portion of the Safety Plan Template may be reproduced without the express, written permission.  You can contact the authors at [email or web address] or [email or web address] del [email or web address].     RAYNE Cazares, [facility]  Behavioral Health Clinician  [facility]  [address] , [place] [address]  [phone]

## 2022-11-08 NOTE — LETTER
11/8/2022         RE: Jena Pinedo  [address] [facility] [address]        Dear Colleague,    Thank you for referring your patient, Jena Pinedo, to the [facility]. Please see a copy of my visit note below.    Chief Complaint   Patient presents with     COMPREHENSIVE EYE EXAM      Accompanied by son    Last Eye Exam: 2 years ago   Dilated Previously: Yes    What are you currently using to see?  glasses       Distance Vision Acuity: Satisfied with vision, wears glasses     Near Vision Acuity: Satisfied with vision while reading and using computer with glasses    Eye Comfort: good  Do you use eye drops? 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       OBJECTIVE: See Ophthalmology exam    ASSESSMENT:    ICD-10-CM    1. Routine eye exam  Z01.00 EYE EXAM (SIMPLE-NONBILLABLE)     REFRACTION      2. Myopia of both eyes  H52.13 EYE EXAM (SIMPLE-NONBILLABLE)     REFRACTION      3. Regular astigmatism of both eyes  H52.223 EYE EXAM (SIMPLE-NONBILLABLE)     REFRACTION          PLAN:     Patient Instructions   Fill glasses prescription  Allow 2 weeks to adapt to change in glasses  Return in 1 year for eye exam    Felecia Benítez O.D.  [facility]  [address] [place], [place] [id number] [phone]          Again, thank you for allowing me to participate in the care of your patient.        Sincerely,        Felecia Benítez, OD

## 2022-11-08 NOTE — PROGRESS NOTES
Chief Complaint   Patient presents with     COMPREHENSIVE EYE EXAM      Accompanied by son    Last Eye Exam: 2 years ago   Dilated Previously: Yes    What are you currently using to see?  glasses       Distance Vision Acuity: Satisfied with vision, wears glasses     Near Vision Acuity: Satisfied with vision while reading and using computer with glasses    Eye Comfort: good  Do you use eye drops? : No  Occupation or Hobbies: Unemployed     [employer] Optometric Assistant           Medical, surgical and family histories reviewed and updated 11/8/2022.       OBJECTIVE: See Ophthalmology exam    ASSESSMENT:    ICD-10-CM    1. Routine eye exam  Z01.00 EYE EXAM (SIMPLE-NONBILLABLE)     REFRACTION      2. Regular astigmatism of both eyes  H52.223 EYE EXAM (SIMPLE-NONBILLABLE)     REFRACTION      3. Presbyopia  H52.4           PLAN:     Patient Instructions   Fill glasses prescription  Allow 2 weeks to adapt to change in glasses  Return in 1 year for eye exam    Felecia Benítez O.D.  [facility]  [address] [place], [place] [id number] [phone]

## 2022-11-08 NOTE — PATIENT INSTRUCTIONS
Fill glasses prescription  Allow 2 weeks to adapt to change in glasses  Return in 1 year for eye exam    Felecia Benítez O.D.  [facility]  [address] [place] [address] [phone]

## 2022-11-08 NOTE — PROGRESS NOTES
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 sensations do you feel?     What smells do you smell?     What people or animals would you want in your safe place?     Imagine a protective bubble, wall or boundary around your safe place.     Imagine a door or gate with a guard at your safe place.     Image a lock and key to your safe place and only you can unlock it.    You can draw or make a collage that represents your safe place.     Choose a souvenir of your safe place -- a color, an object, a song.     Keep your image of your safe place so you can come back to it when you need to.    -Reach out to therapist and other mental health providers, reach out to family members and family friends, identify what makes life worth living; distracting strategies, reach out to crisis lines    Things I can use or do for distraction:   Sing in the shower; take a long walk; count your blessings; read a poem; think about your pet; take a deep breath; let out a big sigh; Buy yourself flowers; Jump rope; Make a  to do  list; Count to 10; Keep a diary; Journal daily; Plant flowers; Do a good deed; Set flexible deadlines; Call a friend; Listen hard; Believe in yourself; Read a good book; See a live play; Write to a friend; Forgive and forget; Eat by candlelight; Go to a ball game; Take a bubble bath;  "End a bad habit; Listen to music; Share what you have; Don t drink and drive; Be in the moment; Set realistic goals ; Eat right ; Hug a friend; Say something nice; Whistle a tune; Stretch a lot; Watch your weight; Walk instead of drive; Sleep late on weekends; Read the comics; Focus on your task; Don t take drugs; Make a gratitude list; Laugh a lot; Be kind to others; Cry when you can; Praise others; Play with your sibling; Set priorities; Reward yourself often; Massage tense muscles; Do the hard tasks first; Take a long bike ride; Smell a flower; Take a personal day; Draw a picture; Avoid negative people; Dance by yourself; Memorize a new song; Start a new hobby; Ask for a hug ; Think positive thoughts; Do volunteer work; Go to bed an hour early; Talk less; Don t procrastinate; Avoid the small stuff; Don t eat junk food; Start a support group; Call your grandparents; Meditate each day; Remember your successes; Get medical checkups; Turn off the noise; Clean up the clutter; Find the silver lining; Let others be themselves; Walk in the rain; Donate to mark; Exercise 20 minutes a day; Start school work early; Set daily goals for yourself; Visualize a peaceful scene; Schedule play time each day; Give the benefit of the doubt  ; See problems as opportunities; Budget your time and money; Make a duplicate set of keys; Break big jobs into small tasks; Teach someone something new; Emphasize your strengths ; Admit you don t know it all; Hum your favorite tune; Take care of your own needs; Remember feelings are not facts; Remember:  this too shall pass;\" Drink a glass of water before bedtime; Change your route to work/school; Develop alternative plans; Avoid seeing, listening to; Reading bad news    Changes I can make to support my mental health and wellness: attend all scheduled mental health appointments; get enough/good sleep; take medications as prescribed; eat a healthy diet; get enough exercise; identify consistent " "relaxation strategies; develop a routine    Step 3: People and social settings that provide distraction:              Name:  Mandie                        Name:   Mom                         Name:    sister Katie            being [facility]     Step 4: Remind myself of people and things that are important to me and worth living for: children    Step 5: When I am in crisis, I can ask these people to help me use my safety plan:               Name:   Mandie                                  Name:   Mom                         Name:    Sister Katie             -Outpatient treatment team; family; friends; crisis lines    Step 6: Making the environment safe:   ? remove alcohol, be around others and pt will take meds together with BF and remain in one place, BF ensures they remain there    Step 7: Professionals or agencies I can contact during a crisis: local ED, [facility], crisis lines     Crisis Lines     1) Your [facility] has a mental health crisis team you can call 24/7: [facility]  [phone]     Crisis Text Line  Text 946337  You will be connected with a trained live crisis counselor to provide support.  Por espanol, texto  SANTHOSH a 815630 o texto a 442-AYUDAME en WhatsApp  National Hope Line  1.800.SUICIDE [2058135]  Crisis Intervention: 335.213.6414 or 443-522-8884 (TTY: 463.994.7416).  Call anytime for help.  Suicide Awareness Voices of Education (SAVE) (www.save.org): 888-511-SAVE (7283)  Text 4 Life: txt \"LIFE\" to 71372 for immediate support and crisis intervention     2) New national suicide and crisis line is now live -- just dial 988     If you or someone you know is struggling or in crisis, help is available. Call or text 988 or chat depictorg (copy and paste into your browser). People can call or text 988 or chat depictorg for themselves OR if they are worried about a loved one who may need crisis support. 988 serves as a universal entry point so that no matter " "where you live in the [place], you can reach a trained crisis counselor who can help.     About 988  988 offers 24/7 access to trained crisis counselors who can help people experiencing mental health-related distress. That could be:    Thoughts of suicide    Mental health or substance use crisis, or    Any other kind of emotion distress     How is 988 different from 911?  988 was established to improve access to crisis services in a way that meets our country s growing suicide and mental health related crisis care needs. 988 will provide easier access to the Lifeline network and related crisis resources, which are distinct from 911 (where the focus is on dispatching Emergency Medical Services, fire and police as needed).      3) Community Resources     Fast Tracker  Linking people to mental health and substance use disorder resources  [employer]   [place] Mental Health Warm Line  Peer to peer support  Monday thru Saturday, 12 pm to 10 pm  308.625.5363 or 1.029.797.7559  Text \"Support\" to 79886  National [place] on Mental Illness (ALYSSIA)  653.095.6211 or 1.888.ALYSSIA.HELPS     4) Mental Health Apps     My3  https://myOur Nurses Networkpp.org/  VirtualHopeBox  https://PhyFlex Networks.org/apps/virtual-hope-box/    Suicide Prevention Apps    Suicide is a leading cause of death among Americans, tragically taking over 45,000 lives per year according to the CDC. While we're not suggesting an jewel alone can save lives, they can be a good resource to go along with counseling and mental health lifelines, like the National Suicide Prevention Lifeline, 543.440.9333, and [place] Lifeline, 708.275.1136.    MY3    Designed to help those stay safe while having thoughts of suicide, MY3 is free and lets you customize your own personal safety plan by noting your warning signs, listing coping strategies, and connecting you to helpful resources to reach out to when you need them most. At your fingertips is a button that puts you in " direct contact (24 hours a day, 7 days a week) with a trained counselor from the National Suicide Prevention Lifeline as well as a 911 alert. Additionally, you can choose three people to contact in the event you're having thoughts of suicide. (Free; iOS and Android)    Moises De Leon is a free jewel developed by a struggling teenager (and her teen brother) for teenagers. The jewel features a large, red button that can be activated to let close friends, family and their support network know help is needed. Users can add up to five trusted contacts as part of their support group so when they hit the digital panic button, a message along with their current GPS location is sent to their contacts. The message reads:  Hey, I'm not OK! Please call, text, or come find me.  (Free; iOS and Android)    General Mental Health Apps    With the state of the world set to crazy mode right now, who isn't having feelings? Whatever your emotion--stress, anger, angst, depression--a little extra help dealing is something we could all use, amiright? These apps are like a little pocket therapy (not to be replaced by actual therapy) that provide approachable, easy-to-use (and sometimes fun) ways to manage every mood, help change unhealthy thought patterns, and give you effective strategies to stay grounded when life feels out of control.    What's Up     What's up is an amazing free jewel that uses Cognitive Behavioral Therapy (CBT) and Acceptance Commitment Therapy (ACT) methods to help you cope with Depression, Anxiety, Stress, and more. Use the positive and negative habit tracker to maintain your good habits, and break those that are counterproductive. We particularly love the  Get Grounded  page, which contains over 100 different questions to pinpoint what you're feeling, and the  Thinking Patterns  page, which teaches you how to stop negative internal monologues. Try it out for yourself. (Free; iOS and Android)    Mood Kit     MoodKit uses  the foundation of Cognitive Behavioral Therapy (CBT) and provides users with over 200 different mood improvement activities. Developed by two clinical psychologists, MoodKit helps you learn how to change how you think, and develop self-awareness and healthy attitudes. The journal feature is a great way to practice self-care by reflecting on the day, noting any distressing thoughts, and documenting how you overcame them. ($4.99; iOS)    Addiction Apps    Addiction is a serious disease that affects too many people--and the facts are staggering: According to the CDC, more than 750,000 Americans  from drug overdoses from  to 2018 and an estimated 88,000 people die from alcohol-related causes annually. Yet, with the proper counseling and dedicated rehab programs, addiction can be treatable. While apps are not a replacement for in-person help, they can provide people suffering with recovery resources at the palm of their hands to help track sobriety, monitor triggering behaviors, and give instant access to support. If you are experiencing an addiction and need help, contact the Substance Abuse and Mental Health Services Administration's (SAMSA) National Helpline at 1-685.717.3942.  Twenty-Four Hours a Day     Based on the best-selling book of the same name, Twenty-Four Hours a Day offers 366 meditations from the book, making it easier for people in recovery from addiction to focus on sobriety wherever they are. (Free iOS and Android)    Quit That! - Habit Tracker     Quit That! is a completely free jewel that helps users beat their habits or addictions. Whether you're looking to stop drinking alcohol, quit smoking, or stop taking drugs, it's the perfect recovery tool to track and monitor your progress. Track as many vices as you want and find out how many minutes, hours, days, weeks, or years it's been since you quit. (Free; iOS)    Anxiety Apps    Those with chronic anxiety know the feeling: The angst is always  there--lurking around like a stage-five clinger. It's the kind of condition that, for the 40 million adults in the [place] age 18 and older who have an anxiety disorder, can be all-consuming when left to its own devices. But anxiety can also be manageable once you learn how to work through all that worry. Seeking help from a mental health professional is the best way to manage anxiety, but, the following apps are great tools to use along the way--like reminding you to focus on your breathing to get out of a vicious thought cycle.    MindShift     MindShift is one of the best mental health apps designed specifically for teens and young adults with anxiety. Rather than trying to avoid anxious feelings, Mind Shift stresses the importance of changing how you think about anxiety. Think of this jewel as the cheerleader in your pocket, encouraging you to take charge of your life, ride out intense emotions, and face challenging situations. (Free; iOS and Android)    Self-Help for Anxiety Management (LETHA)     [facility] might be perfect for you if you're interested in self-help, but meditation isn't your thing. Users are prompted to build their own 24-hour anxiety toolkit that allows you to track anxious thoughts and behavior over time, and learn 25 different self-help techniques. You can also use LETHA's  Social Cloud  feature to confidentially connect with other users in an online community for additional support. (Free; iOS and Android)    CBT Thought Record Diary     The centerpiece of cognitive-behavioral therapy is changing your emotions by identifying negative and distorted thinking patterns. You can use CBT Thought Record Diary to document negative emotions, analyze flaws in your thinking, and reevaluate your thoughts. This is a great jewel for gradually changing your approach to anxiety-inducing situations and your thinking patterns for future situations. (Free; iOS and Android)    Depression Apps    If you have  depression, life can seem like a giant pit of quicksand that's constantly pulling you under with no way out. Let's just say, it's a heavy state of being. And it's also one of the most common mental health conditions, affecting about 350 million people. If left alone, depression can continue to linger and linger, taking a toll on your quality of life. But there is a bright side: It's treatable. Seeking help from a mental health professional is the first step. And for those in therapy, there are also some good apps that can do everything from helping to boost your mood to connecting you with a trained professional who can offer virtual counseling. If you are struggling or in crisis, call the National Suicide Prevention Lifeline 4-618-512-DYCH (4553).    Hanh     Need a happy fix? With its psychologist-approved mood-training program, the Appfrica jewel is your fast-track to a good mood. Try various engaging games, activity suggestions, gratitude prompts and more to train your brain as if it were a muscle, to overcome negative thoughts. The best part? Its free!  (Free; iOS and Android)    MoodTools     MoodTools aims to support people with clinical depression by aiding the path to recovery. Discover helpful videos that can improve your mood and behavior, log and analyze your thoughts using Cognitive Behavioral Therapy (CBT) principles, develop a suicide safety plan and more with this free jewel. (Free; iOS and Android)    Mindfulness and Meditation Apps    From guided meditations, breathing exercises and videos to stories and soothing music, mindfulness and meditation apps are basically the answer to your angsty prayers. Experts believe regular meditation can actually change aspects of brain functioning. And for long-term changes, studies show that it takes about eight weeks of practice to make a real difference. Whether you have five minutes or an entire afternoon, these apps are guaranteed to create a sense of calm in  your anxious brain--and all from the comfort of your couch. Sapphire.    Headspace     The Headspace antoinette makes meditation simple. Learn the skills of mindfulness and meditation by using this antoinette for just a few minutes per day. You gain access to hundreds of meditations on everything from stress and anxiety to sleep and focus. The antoinette also has a handy  get some headspace  reminder to encourage you to keep practicing each day.  ($12.99/Month or $9.99/Year for students; iOS and Android)    Calm     Named by Apple as the 2017 [employer] Video Antoinette of the Year, Calm is quickly becoming regarded as one of the best mental health apps available. Calm provides people experiencing stress and anxiety with guided meditations, sleep stories, breathing programs, and relaxing music. This antoinette is truly universal; whether you've never tried meditation before or regularly practice, you'll find the perfect program for you. ($12.99/Month; iOS and Android)    Ten Percent Happier     Want to sleep better, find relaxation, be more mindful and, well, ten percent happier? This is the antoinette for you. Ten Percent Happier has a library of 500+ guided meditations on topics ranging from anxiety and stress to parenting and sleep, as well as videos, bite-sized stories, and inspiration you can listen to on the go. New content is added weekly so you'll never tire of having to do the same meditative practice again and again.  ($12.99/Month; iOS and Android)       I helped develop this safety plan and agree to use it when needed.  I have been given a copy of this plan.       Patient signature: _______________________________________________________________     Today s date:  November 8, 2022     Adapted from Safety Plan Template 2008 Leigh Ann Rosales and Nima Lugo is reprinted with the express permission of the authors.  No portion of the Safety Plan Template may be reproduced without the express, written permission.  You can contact the authors at  [email or web address] or [email or web address].     RAYNE Cazares, [facility]  Behavioral Health Clinician  [facility]  [address] , [place] [address]  [phone]

## 2022-11-15 ENCOUNTER — OFFICE VISIT (OUTPATIENT)
Dept: BEHAVIORAL HEALTH | Facility: CLINIC | Age: 42
End: 2022-11-15
Payer: COMMERCIAL

## 2022-11-15 DIAGNOSIS — F33.2 SEVERE EPISODE OF RECURRENT MAJOR DEPRESSIVE DISORDER, WITHOUT PSYCHOTIC FEATURES (H): Primary | ICD-10-CM

## 2022-11-15 DIAGNOSIS — F43.9 TRAUMA AND STRESSOR-RELATED DISORDER: ICD-10-CM

## 2022-11-15 DIAGNOSIS — F41.1 GENERALIZED ANXIETY DISORDER: ICD-10-CM

## 2022-11-15 PROCEDURE — 90832 PSYTX W PT 30 MINUTES: CPT
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
## 2022-11-22 ENCOUNTER — OFFICE VISIT (OUTPATIENT)
Dept: BEHAVIORAL HEALTH | Facility: CLINIC | Age: 42
End: 2022-11-22
Payer: COMMERCIAL

## 2022-11-22 DIAGNOSIS — F33.2 SEVERE EPISODE OF RECURRENT MAJOR DEPRESSIVE DISORDER, WITHOUT PSYCHOTIC FEATURES (H): Primary | ICD-10-CM

## 2022-11-22 DIAGNOSIS — F43.9 TRAUMA AND STRESSOR-RELATED DISORDER: ICD-10-CM

## 2022-11-22 DIAGNOSIS — F41.1 GENERALIZED ANXIETY DISORDER: ICD-10-CM

## 2022-11-22 PROCEDURE — 90834 PSYTX W PT 45 MINUTES: CPT
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
## 2022-11-29 NOTE — TELEPHONE ENCOUNTER
Called patient with their appointment reminder for tomorrow's appt (transfer of care appt with Bettie Mayers CNP) and verified that patient's aware it's at our clinic in [place] (patient knows its address).    EMR reflects that on 8/11/2022, pain clinic and patient scheduled a 1-hour establish care appointment with Bettie Mayers CNP for 8/31/2022 at 10:30 AM and patient No Showed.    EMR also reflects that on 10/18/2022, patient scheduled another establish care appointment with Bettie Mayers for 11/30/2022 at 8:00 AM.    Closing encounter.      Mita Dawson  Patient Representative  [facility]

## 2022-11-30 ENCOUNTER — VIRTUAL VISIT (OUTPATIENT)
Dept: BEHAVIORAL HEALTH | Facility: CLINIC | Age: 42
End: 2022-11-30
Payer: COMMERCIAL

## 2022-11-30 ENCOUNTER — OFFICE VISIT (OUTPATIENT)
Dept: PALLIATIVE MEDICINE | Facility: CLINIC | Age: 42
End: 2022-11-30
Payer: COMMERCIAL

## 2022-11-30 VITALS — HEART RATE: 93 BPM | SYSTOLIC BLOOD PRESSURE: 97 MMHG | DIASTOLIC BLOOD PRESSURE: 65 MMHG

## 2022-11-30 DIAGNOSIS — F41.1 GENERALIZED ANXIETY DISORDER: ICD-10-CM

## 2022-11-30 DIAGNOSIS — M79.18 MYOFASCIAL PAIN: Primary | ICD-10-CM

## 2022-11-30 DIAGNOSIS — F33.2 SEVERE EPISODE OF RECURRENT MAJOR DEPRESSIVE DISORDER, WITHOUT PSYCHOTIC FEATURES (H): Primary | ICD-10-CM

## 2022-11-30 DIAGNOSIS — F43.9 TRAUMA AND STRESSOR-RELATED DISORDER: ICD-10-CM

## 2022-11-30 PROCEDURE — 90832 PSYTX W PT 30 MINUTES: CPT | Mod: 95

## 2022-11-30 PROCEDURE — 99215 OFFICE O/P EST HI 40 MIN: CPT

## 2022-11-30 ASSESSMENT — PAIN SCALES - GENERAL: PAINLEVEL: SEVERE PAIN (6)

## 2022-11-30 NOTE — PATIENT INSTRUCTIONS
1.  Pain Physical Therapy:  NO   - We will plan to refer once you have insurance coverage again. Please contact the clinic if you would like a referral before your next follow up.    2.  Pain Psychologist to address relaxation, behavioral change, coping style, and other factors important to improvement.  NO    3.  Diagnostic Studies:  None    4.  Medication Management:   Increase/continue tizanidine 4-8 mg up to three times daily as needed for muscle pain and spasms.     5.  Potential procedures: Please contact the clinic when you are ready to make an appointment for trigger point injections with my colleague, Dr. Alfonso.     6.  Referrals/Other orders:   Recommend trigger point stick ($30 on [employer]).  Try neck stretches demonstrated during visit.    7.  Follow up with ZEYAD Messer CNP on 2/7/23 at 8 am      ----------------------------------------------------------------  Clinic Number:  [phone]   Call with any questions about your care and for scheduling assistance.   Calls are returned Monday through Friday between 8 AM and 4:30 PM. We usually get back to you within 2 business days depending on the issue/request.    If we are prescribing your medications:  For opioid medication refills, call the clinic or send a Jet[employer] message 7 days in advance.  Please include:  Name of requested medication  Name of the pharmacy.  For non-opioid medications, call your pharmacy directly to request a refill. Please allow 3-4 days to be processed.   Per [place] State Law:  All controlled substance prescriptions must be filled within 30 days of being written.    For those controlled substances allowing refills, pickup must occur within 30 days of last fill.      We believe regular attendance is key to your success in our program!    Any time you are unable to keep your appointment we ask that you call us at least 24 hours in advance to cancel.This will allow us to offer the appointment time to another patient.   Multiple  missed appointments may lead to dismissal from the clinic.

## 2022-11-30 NOTE — PROGRESS NOTES
[facility]     Date of visit: 11/30/2022      Assessment:   Jena Pinedo is a 41 year old female with a past medical history significant for migraines, pelvic pain, LBP, depression/anxiety who presents with complaints of widespread pain.     1. Widespread pain - She has pain in neck and low back that has been present for several years. Physical exam findings significant for myofascial tenderness in cervical paraspinals, trapezius, rhomboids, and lumbosacral region. Etiology of symptoms is most consistent with myofascial type pain, though it is possible underlying degenerative change of spine/joint may be contributing to some extent.   2. Mental Health - the patient's mental health concerns, specifically anxiety and depression, affect her experience of pain and contribute to her clinically significant distress. I think it is important to support mental health as part of the chronic pain treatment plan. She is currently working with mental health provider.     Visit Diagnoses:  1. Myofascial pain        Plan:  Diagnosis reviewed, treatment option addressed, and risk/benifits discussed.  Self-care instructions given.  I am recommending a multidisciplinary treatment plan to help this patient better manage their pain.     1.  Pain Physical Therapy:  NO   - We will plan to refer once you have insurance coverage again. Please contact the clinic if you would like a referral before your next follow up.    2.  Pain Psychologist to address relaxation, behavioral change, coping style, and other factors important to improvement.  NO    3.  Diagnostic Studies:  None    4.  Medication Management:   1. Increase/continue tizanidine 4-8 mg up to three times daily as needed for muscle pain and spasms.     5.  Potential procedures: Please contact the clinic when you are ready to make an appointment for trigger point injections with my colleague, Dr. Alfonso.     6.  Referrals/Other orders:   1. Recommend  trigger point stick ($30 on [employer]).  2. Try neck stretches demonstrated during visit.    7.  Follow up with ZEYAD Messer CNP on 2/7/23 at 8 am      Review of Electronic Chart: Today I have also reviewed available medical information in the patient's medical record at [facility] ([facility]) and Care Everywhere (if available), including relevant provider notes, laboratory work, and imaging.     Bettie Mayers DNP, ZEYAD, AGNP-C  [facility]     -------------------------------------------------    Subjective:    Chief complaint:   Chief Complaint   Patient presents with     Pain       Interval history:  Jena Pinedo is a 41 year old female last seen on 2/24/22.  They are a patient of Dr. Moreno seen by me for the first time in follow up and to establish/transfer care.     Recommendations/plan at the last visit included:  - Medications.   Tizanidine 4 mg 3 times daily as needed  Lidocaine cream 4% 4 times daily as needed  - Interventional procedures:  Trigger point injections in the upper back and lower back as needed.  Patient would like to try noninvasive procedure first and will contact us when her pain is well managed.  - Labs and imaging: None needed for pain management.   - Rehab: Advised to perform home exercise using [employer] videos  [email or web address].  The patient is also encouraged to stay active as tolerated.  Recommend to use TENS (Transcutaneous Electrical Nerve Stimulation) unit.  Electrotherapy via a TENS unit involves placement of trial pads/electrodes on the skin over the painful area.   If the patient perceives benefit, the patient can continue to use the machine.  It offers the advantage of being noninterventional and under the control of the patient. Evidence suggests efficacy of TENS unit in some chronic pain conditions. PAIN  Volume 154, Issue 11, November 2013, Pages 8245-4577  - Psychology: recommend to follow with her pyschologist   -  Integrated medicine: We discussed acupuncture therapy. We will refer the patient for acupuncture therapy in the future.  - Disposition: Follow-up with the clinic nurse practitioner.    Since her last visit, Jena Pinedo reports:    -She is here today to establish care with me today.   -Her pain is worse than at last visit.   -She had TPI in the past and found these very helpful.  -She is interested in repeating once she has insurance again.   -She uses ice and heat.   -She has tried lidocaine cream and patches, did not find these very helpful.     -She recently lost her job and will not have insurance for a month.  -She is a , on her feet a lot.   -She has two kids, 21 and 12, both boys.       HPI from initial visit with Dr. Moreno:   Chief complaints:  Chronic neck pain, right lower back pain, shoulder pain      History of Present illness:      Jena Pinedo is a 41 year old female clinic with complaint of generalized body pain.  Pain now mostly located in the base of her neck, upper back, and sometimes in the lower back.  Pain is dull aching and constant in nature.  In the past she has managed her pain with muscle relaxant (tizanidine) which she ran out.  She states that she has been having this pain for a long time.  She has tried multiple modalities of treatment with some benefit.  She underwent trigger point injection in the mid back and lower back which helped her some.  She has significant depression issues for which she has been seeing a therapist.  Due to COVID-19 issues, she is unable to schedule therapist.      Her MRI of the lumbar thoracic and cervical spine performed in 2014 and is unremarkable.     Trials of therapies  including      PT: Yes: having pain   TENs Unit: Yes:   Manual Medicine/Chiropractic: Yes:   Surgeries:No  Acupuncture: No  Other Integrative therapies: No  Behavioral interventions: No  Previous medication treatments included: Tizanidine  Previous pain  interventions: likely trigger point injection     Pain Information:   Pain rating: averages 6/10 on a 0-10 scale.      Current pain medications:   Tizanidine 4-8 mg TID PRN   Topical menthol 5% patches/cream    Current MME: 0     Review of [place] Prescription Monitoring Program (): No concern for abuse or misuse of controlled medications based on this report.     Annual Controlled Substance Agreement/UDS due date: N/A    Past pain treatments:    Injections - TPI     Medications:  Current Outpatient Medications   Medication Sig Dispense Refill     amitriptyline (ELAVIL) 50 MG tablet TAKE ONE  TABLET BY MOUTH AT BEDTIME 90 tablet 0     FLUoxetine (PROZAC) 20 MG capsule Take 3 capsules (60 mg) by mouth daily 270 capsule 1     hydrOXYzine (ATARAX) 25 MG tablet Take 1 tablet (25 mg) by mouth 3 times daily as needed for anxiety 60 tablet 1     levonorgestrel (MIRENA) 20 MCG/24HR IUD 1 each by Intrauterine route once.       tiZANidine (ZANAFLEX) 4 MG tablet Take 1-2 tablets (4-8 mg) by mouth 3 times daily 180 tablet 1     lidocaine (LMX4) 4 % external cream Apply topically once as needed for mild pain (Patient not taking: Reported on 11/30/2022) 30 g 1     tiZANidine (ZANAFLEX) 4 MG tablet TAKE ONE-HALF TO ONE TABLET BY MOUTH THREE TIMES A DAY (Patient not taking: Reported on 11/7/2022) 60 tablet 0       Medical History: any changes in medical history since they were last seen? No      Objective:    Physical Exam:  Blood pressure 97/65, pulse 93, not currently breastfeeding.  Constitutional: Well developed, well nourished, appears stated age.  Gait: Normal  HEENT: Head atraumatic, normocephalic. Eyes without conjunctival injection or jaundice. Oropharynx clear. Neck supple. No obvious neck masses.  Skin: No rash, lesions, or petechiae of exposed skin.   Psychiatric/mental status: Alert, without lethargy or stupor. Speech fluent. Appropriate affect. Mood normal. Able to follow commands without difficulty.   MSK:  myofascial tenderness cervical yovani, traps, rhomboids and lumbosacral paraspinals.     Imaging:  None     BILLING TIME DOCUMENTATION:   The total TIME spent on this patient on the date of the encounter/appointment was 64 minutes.      TOTAL TIME includes:   Time spent preparing to see the patient (reviewing records and tests)   Time spent face to face (or over the phone) with the patient   Time spent ordering tests, medications, procedures and referrals   Time spent Referring and communicating with other healthcare professionals   Time spent documenting clinical information in Epic
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
## 2022-12-10 ENCOUNTER — HEALTH MAINTENANCE LETTER (OUTPATIENT)
Age: 42
End: 2022-12-10

## 2023-03-15 ENCOUNTER — E-VISIT (OUTPATIENT)
Dept: FAMILY MEDICINE | Facility: CLINIC | Age: 43
End: 2023-03-15

## 2023-03-15 DIAGNOSIS — M79.18 MYOFASCIAL PAIN: ICD-10-CM

## 2023-03-15 DIAGNOSIS — G43.909 MIGRAINE WITHOUT STATUS MIGRAINOSUS, NOT INTRACTABLE, UNSPECIFIED MIGRAINE TYPE: ICD-10-CM

## 2023-03-15 DIAGNOSIS — F41.0 PANIC ATTACK: ICD-10-CM

## 2023-03-15 DIAGNOSIS — F33.0 MILD RECURRENT MAJOR DEPRESSION (H): ICD-10-CM

## 2023-03-15 DIAGNOSIS — F41.9 ANXIETY DISORDER, UNSPECIFIED TYPE: ICD-10-CM

## 2023-03-15 PROCEDURE — 99423 OL DIG E/M SVC 21+ MIN: CPT | Performed by: PHYSICIAN ASSISTANT

## 2023-03-15 ASSESSMENT — ANXIETY QUESTIONNAIRES
GAD7 TOTAL SCORE: 10
6. BECOMING EASILY ANNOYED OR IRRITABLE: SEVERAL DAYS
GAD7 TOTAL SCORE: 10
4. TROUBLE RELAXING: SEVERAL DAYS
2. NOT BEING ABLE TO STOP OR CONTROL WORRYING: SEVERAL DAYS
3. WORRYING TOO MUCH ABOUT DIFFERENT THINGS: SEVERAL DAYS
1. FEELING NERVOUS, ANXIOUS, OR ON EDGE: MORE THAN HALF THE DAYS
8. IF YOU CHECKED OFF ANY PROBLEMS, HOW DIFFICULT HAVE THESE MADE IT FOR YOU TO DO YOUR WORK, TAKE CARE OF THINGS AT HOME, OR GET ALONG WITH OTHER PEOPLE?: VERY DIFFICULT
5. BEING SO RESTLESS THAT IT IS HARD TO SIT STILL: NEARLY EVERY DAY
7. FEELING AFRAID AS IF SOMETHING AWFUL MIGHT HAPPEN: SEVERAL DAYS
GAD7 TOTAL SCORE: 10
7. FEELING AFRAID AS IF SOMETHING AWFUL MIGHT HAPPEN: SEVERAL DAYS

## 2023-03-15 ASSESSMENT — PATIENT HEALTH QUESTIONNAIRE - PHQ9
SUM OF ALL RESPONSES TO PHQ QUESTIONS 1-9: 14
10. IF YOU CHECKED OFF ANY PROBLEMS, HOW DIFFICULT HAVE THESE PROBLEMS MADE IT FOR YOU TO DO YOUR WORK, TAKE CARE OF THINGS AT HOME, OR GET ALONG WITH OTHER PEOPLE: SOMEWHAT DIFFICULT
SUM OF ALL RESPONSES TO PHQ QUESTIONS 1-9: 14

## 2023-03-16 RX ORDER — AMITRIPTYLINE HYDROCHLORIDE 50 MG/1
TABLET ORAL
Qty: 90 TABLET | Refills: 0 | Status: SHIPPED | OUTPATIENT
Start: 2023-03-16 | End: 2023-08-30

## 2023-03-16 RX ORDER — HYDROXYZINE HYDROCHLORIDE 25 MG/1
25 TABLET, FILM COATED ORAL 3 TIMES DAILY PRN
Qty: 60 TABLET | Refills: 1 | Status: SHIPPED | OUTPATIENT
Start: 2023-03-16 | End: 2023-06-22

## 2023-03-16 ASSESSMENT — PATIENT HEALTH QUESTIONNAIRE - PHQ9: SUM OF ALL RESPONSES TO PHQ QUESTIONS 1-9: 14

## 2023-03-16 ASSESSMENT — ANXIETY QUESTIONNAIRES: GAD7 TOTAL SCORE: 10

## 2023-06-21 DIAGNOSIS — F41.9 ANXIETY DISORDER, UNSPECIFIED TYPE: ICD-10-CM

## 2023-06-21 DIAGNOSIS — F41.0 PANIC ATTACK: ICD-10-CM

## 2023-06-22 RX ORDER — HYDROXYZINE HYDROCHLORIDE 25 MG/1
TABLET, FILM COATED ORAL
Qty: 60 TABLET | Refills: 0 | Status: SHIPPED | OUTPATIENT
Start: 2023-06-22 | End: 2023-10-08

## 2023-07-21 DIAGNOSIS — M79.18 MYOFASCIAL PAIN: ICD-10-CM

## 2023-08-16 ENCOUNTER — TELEPHONE (OUTPATIENT)
Dept: FAMILY MEDICINE | Facility: CLINIC | Age: 43
End: 2023-08-16
Payer: COMMERCIAL

## 2023-08-16 NOTE — TELEPHONE ENCOUNTER
Patient Quality Outreach    Patient is due for the following:   Depression  -  PHQ-9 needed- failed PHQ-9  Physical Preventive Adult Physical    Next Steps:   Patient was scheduled for 09/11/23 with Kristen Kehr PA-C for a physical, will update phq-9 at the time of appointment.     Type of outreach:    none      Questions for provider review:    None           Rivera Salazar MA

## 2023-08-30 ENCOUNTER — OFFICE VISIT (OUTPATIENT)
Dept: PALLIATIVE MEDICINE | Facility: CLINIC | Age: 43
End: 2023-08-30
Payer: COMMERCIAL

## 2023-08-30 VITALS — SYSTOLIC BLOOD PRESSURE: 96 MMHG | HEART RATE: 72 BPM | DIASTOLIC BLOOD PRESSURE: 61 MMHG

## 2023-08-30 DIAGNOSIS — M79.18 MYOFASCIAL PAIN: ICD-10-CM

## 2023-08-30 DIAGNOSIS — Z51.81 ENCOUNTER FOR THERAPEUTIC DRUG MONITORING: ICD-10-CM

## 2023-08-30 DIAGNOSIS — G43.909 MIGRAINE WITHOUT STATUS MIGRAINOSUS, NOT INTRACTABLE, UNSPECIFIED MIGRAINE TYPE: ICD-10-CM

## 2023-08-30 DIAGNOSIS — M54.10 RADICULAR PAIN OF UPPER EXTREMITY: Primary | ICD-10-CM

## 2023-08-30 DIAGNOSIS — M54.2 NECK PAIN: ICD-10-CM

## 2023-08-30 DIAGNOSIS — M54.10 RADICULAR LEG PAIN: ICD-10-CM

## 2023-08-30 DIAGNOSIS — R29.898 BILATERAL ARM WEAKNESS: ICD-10-CM

## 2023-08-30 PROCEDURE — 99215 OFFICE O/P EST HI 40 MIN: CPT

## 2023-08-30 RX ORDER — AMITRIPTYLINE HYDROCHLORIDE 75 MG/1
TABLET ORAL
Qty: 30 TABLET | Refills: 1 | Status: SHIPPED | OUTPATIENT
Start: 2023-08-30 | End: 2023-11-08

## 2023-08-30 RX ORDER — AMITRIPTYLINE HYDROCHLORIDE 75 MG/1
TABLET ORAL
Qty: 30 TABLET | Refills: 1 | Status: SHIPPED | OUTPATIENT
Start: 2023-08-30 | End: 2023-08-30

## 2023-08-30 ASSESSMENT — PAIN SCALES - GENERAL: PAINLEVEL: MODERATE PAIN (4)

## 2023-08-30 NOTE — PROGRESS NOTES
[facility] Pain Management     Date of visit: 8/30/2023      Assessment:   Jena Pinedo is a 42 year old female with a past medical history significant for migraines, pelvic pain, LBP, depression/anxiety who presents with complaints of widespread pain.      Widespread pain - She has pain in neck and low back that has been present for several years. Physical exam findings significant for myofascial tenderness in cervical paraspinals, trapezius, rhomboids, and lumbosacral region. Etiology of symptoms is most consistent with myofascial type pain, though it is possible underlying degenerative change of spine/joint may be contributing to some extent.   Mental Health - the patient's mental health concerns, specifically anxiety and depression, affect her experience of pain and contribute to her clinically significant distress. I think it is important to support mental health as part of the chronic pain treatment plan. She is currently working with mental health provider.     Visit Diagnoses:  1. Radicular pain of upper extremity    2. Radicular leg pain    3. Myofascial pain    4. Neck pain    5. Encounter for therapeutic drug monitoring    6. Bilateral arm weakness    7. Migraine without status migrainosus, not intractable, unspecified migraine type        Plan:  Diagnosis reviewed, treatment option addressed, and risk/benifits discussed.  Self-care instructions given.  I am recommending a multidisciplinary treatment plan to help this patient better manage their pain.                  1.  Pain Physical Therapy:  YES - I am referring for targeting stretching, strengthening, and home exercise plan to support functional goals/ADLs.  If you have not been contacted to schedule within 2 business days, please call [phone] to make an appointment.               2.  Pain Psychologist to address relaxation, behavioral change, coping style, and other factors important to improvement.  NO               3.   Diagnostic Studies:      -Cervical MRI ordered today. She reports persistent BUE radicular symptoms. Weakness noted on exam in BUE.   -EKG 8/30/23 completed today - QT/QTc WNL, abnormal EKG demonstrates incomplete right BBB. Message sent to PCP to further address at follow up on finding and MyChart message sent to patient with update.               4.  Medication Management:   Continue tizanidine 8 mg up to three times daily as needed for muscle pain and spasms.  Refilled today.   Increase amitriptyline to 75 mg at bedtime. Monitor for changes in baseline pain levels, intensity of fluctuations, improvements in sleep.   Monitor for sedation - may cause dizziness or drowsiness. Be careful driving/moving around until you know effects of medication. Avoid concurrent alcohol use.               5.  Potential procedures: Please contact the clinic when you are ready to make an appointment for trigger point injections with my colleague, Dr. Alfonso.                6.  Referrals/Other orders: None               7.  Follow up with ZEYAD Messer CNP in 6-8 weeks    Review of Electronic Chart: Today I have also reviewed available medical information in the patient's medical record at [facility] ([facility]) and Care Everywhere (if available), including relevant provider notes, laboratory work, and imaging.     Bettie Mayers, SANDEE, APRN, AGNP-C  [facility] Pain Management     -------------------------------------------------    Subjective:    Chief complaint:   Chief Complaint   Patient presents with    Pain       Interval history:  Jena Pinedo is a 42 year old female last seen on 11/30/23.  They are a patient of mine seen in follow up.     Recommendations/plan at the last visit included:              1.  Pain Physical Therapy:  NO   - We will plan to refer once you have insurance coverage again. Please contact the clinic if you would like a referral before your next follow up.               2.  Pain  "Psychologist to address relaxation, behavioral change, coping style, and other factors important to improvement.  NO               3.  Diagnostic Studies:  None               4.  Medication Management:   Increase/continue tizanidine 4-8 mg up to three times daily as needed for muscle pain and spasms.                5.  Potential procedures: Please contact the clinic when you are ready to make an appointment for trigger point injections with my colleague, Dr. Alfonso.                6.  Referrals/Other orders:   Recommend trigger point stick ($30 on [employer]).  Try neck stretches demonstrated during visit.               7.  Follow up with ZEYAD Messer CNP on 2/7/23 at 8 am    Since her last visit, Jena Whitney Khris reports:  -her pain is about the same as it was at last visit.   -She reports electrical shock sensation across upper back and shoulder blades.   -She reports changes in strength in arms/hands.   -She states she is having episodes of \"twitches,\" will drop objects on the floor.   -She reports intermittent radicular arm pain that stops at elbows.   -Last cervical MRI in 2013  -She also reports event last night where left leg started \"tingling,\" states front of leg and top of foot, lasted about 5 minutes, then shocking pains as sensation returned in foot for about 3 minutes.   -She states that this has only happened once.   -She will continue to monitor to see if this happens again.   -She also notes she is tripping over right foot intermittently and does fall.   -Denies red flag symptoms.   -She continues to take tizanidine 8 mg TID PRN.    Pain Information:   Pain rating: averages 6/10 on a 0-10 scale.      Interval history from last visit on 11/30/23:  -Her pain is worse than at last visit.   -She had TPI in the past and found these very helpful.  -She is interested in repeating once she has insurance again.   -She uses ice and heat.   -She has tried lidocaine cream and patches, did not find these " very helpful.   -She recently lost her job and will not have insurance for a month.  -She is a , on her feet a lot.   -She has two kids, 21 and 12, both boys.         HPI from initial visit with Dr. Moreno:   Chief complaints:  Chronic neck pain, right lower back pain, shoulder pain      History of Present illness:      Jena Pinedo is a 41 year old female clinic with complaint of generalized body pain.  Pain now mostly located in the base of her neck, upper back, and sometimes in the lower back.  Pain is dull aching and constant in nature.  In the past she has managed her pain with muscle relaxant (tizanidine) which she ran out.  She states that she has been having this pain for a long time.  She has tried multiple modalities of treatment with some benefit.  She underwent trigger point injection in the mid back and lower back which helped her some.  She has significant depression issues for which she has been seeing a therapist.  Due to COVID-19 issues, she is unable to schedule therapist.      Her MRI of the lumbar thoracic and cervical spine performed in 2014 and is unremarkable.     Trials of therapies  including      PT: Yes: having pain   TENs Unit: Yes:   Manual Medicine/Chiropractic: Yes:   Surgeries:No  Acupuncture: No  Other Integrative therapies: No  Behavioral interventions: No  Previous medication treatments included: Tizanidine  Previous pain interventions: likely trigger point injection         Current Pain Treatments:    Medications:     Tizanidine 4-8 mg TID PRN   Topical menthol 5% patches/cream  Amitriptyline     2. Other therapies:    Pain PT   Cervical MRI     Current MME: 0      Review of [place] Prescription Monitoring Program (): No concern for abuse or misuse of controlled medications based on this report. Reviewed - appears appropriate.      Annual Controlled Substance Agreement/UDS due date: N/A     Past pain treatments:     Injections - TPI        Medications:  Current Outpatient Medications   Medication Sig Dispense Refill    amitriptyline (ELAVIL) 75 MG tablet TAKE ONE  TABLET BY MOUTH AT BEDTIME 30 tablet 1    FLUoxetine (PROZAC) 20 MG capsule Take 3 capsules (60 mg) by mouth daily 270 capsule 1    hydrOXYzine (ATARAX) 25 MG tablet TAKE 1 TABLET BY MOUTH THREE TIMES DAILY AS NEEDED FOR ANXIETY 60 tablet 0    levonorgestrel (MIRENA) 20 MCG/24HR IUD 1 each by Intrauterine route once.      tiZANidine (ZANAFLEX) 4 MG tablet Take 1-2 tablets (4-8 mg) by mouth 3 times daily 180 tablet 1    lidocaine (LMX4) 4 % external cream Apply topically once as needed for mild pain (Patient not taking: Reported on 11/30/2022) 30 g 1       Medical History: any changes in medical history since they were last seen? No      Objective:    Physical Exam:  Blood pressure 96/61, pulse 72, not currently breastfeeding.  Constitutional: Well developed, well nourished, appears stated age.  Gait: Normal  HEENT: Head atraumatic, normocephalic. Eyes without conjunctival injection or jaundice. Oropharynx clear. Neck supple. No obvious neck masses.  Skin: No rash, lesions, or petechiae of exposed skin.   Psychiatric/mental status: Alert, without lethargy or stupor. Speech fluent. Appropriate affect. Mood normal. Able to follow commands without difficulty.   MSK/neuro: cervical ROM limited with extension and rotation, positive facet pain with rot/ext bilat. BUE - weakness, sensory intact. BLE - strength and sensory intact. DTR intact (B, BR, P, A)    Diagnostic Tests/Imaging/Labs:  EKG 8/30/23 (completed today) - QT/QTc WNL, abnormal EKG demonstrates incomplete right BBB.    BILLING TIME DOCUMENTATION:   The total TIME spent on this patient on the date of the encounter/appointment was 45 minutes.      TOTAL TIME includes:   Time spent preparing to see the patient (reviewing records and tests)   Time spent face to face (or over the phone) with the patient   Time spent ordering tests,  medications, procedures and referrals   Time spent Referring and communicating with other healthcare professionals   Time spent documenting clinical information in Epic

## 2023-08-30 NOTE — PROGRESS NOTES
08/30/23 0756   PEG: A Thee-Item Scale Assessing Pain Intensity and Interference        0 = No pain / No interference    10 = Pain as bad as you can imagine / Completely interferes   What number best describes your pain on average in the past week? 5   What number best describes how, during the past week, pain has interfered with your enjoyment of life? 5   What number best describes how, during the past week, pain has interfered with your general activity? 5   PEG Total Score 5

## 2023-08-30 NOTE — PATIENT INSTRUCTIONS
1.  Pain Physical Therapy:  YES - I am referring for targeting stretching, strengthening, and home exercise plan to support functional goals/ADLs.  If you have not been contacted to schedule within 2 business days, please call [phone] to make an appointment.               2.  Pain Psychologist to address relaxation, behavioral change, coping style, and other factors important to improvement.  NO               3.  Diagnostic Studies:      -Cervical MRI ordered today. She reports persistent BUE radicular symptoms. Weakness noted on exam in BUE.     IMAGING SERVICES HOURS:    All imaging modalities are available from 7 a.m. - 9 p.m. Monday through Friday  X-ray, CT, MRI, and General Ultrasound appointments are available from 7 a.m. -3:30 p.m. on Saturdays  X-ray, CT and MRI appointments are available from 8 a.m. - 4:30 p.m. on Sundays  Please call [phone] to schedule imaging exams    -EKG 8/30/23 completed today - QT/QTc WNL, abnormal EKG demonstrates incomplete right BBB. Message sent to PCP to further address at follow up on finding and [email or web address] message sent to patient with update.               4.  Medication Management:   Continue tizanidine 8 mg up to three times daily as needed for muscle pain and spasms.  Refilled today.   Increase amitriptyline to 75 mg at bedtime. Monitor for changes in baseline pain levels, intensity of fluctuations, improvements in sleep.   Monitor for sedation - may cause dizziness or drowsiness. Be careful driving/moving around until you know effects of medication. Avoid concurrent alcohol use.               5.  Potential procedures: Please contact the clinic when you are ready to make an appointment for trigger point injections with my colleague, Dr. Alfonso.                6.  Referrals/Other orders: None               7.  Follow up with ZEYAD Messer CNP in 6-8 weeks    ----------------------------------------------------------------  Clinic Number:   [phone]   Call with any questions about your care and for scheduling assistance.   Calls are returned Monday through Friday between 8 AM and 4:30 PM. We usually get back to you within 2 business days depending on the issue/request.    If we are prescribing your medications:  For opioid medication refills, call the clinic or send a [employer] message 7 days in advance.  Please include:  Name of requested medication  Name of the pharmacy.  For non-opioid medications, call your pharmacy directly to request a refill. Please allow 3-4 days to be processed.   Per [place] State Law:  All controlled substance prescriptions must be filled within 30 days of being written.    For those controlled substances allowing refills, pickup must occur within 30 days of last fill.      We believe regular attendance is key to your success in our program!    Any time you are unable to keep your appointment we ask that you call us at least 24 hours in advance to cancel.This will allow us to offer the appointment time to another patient.   Multiple missed appointments may lead to dismissal from the clinic.

## 2023-09-11 ENCOUNTER — OFFICE VISIT (OUTPATIENT)
Dept: FAMILY MEDICINE | Facility: CLINIC | Age: 43
End: 2023-09-11
Payer: COMMERCIAL

## 2023-09-11 VITALS
BODY MASS INDEX: 25.9 KG/M2 | HEIGHT: 67 IN | SYSTOLIC BLOOD PRESSURE: 98 MMHG | OXYGEN SATURATION: 98 % | DIASTOLIC BLOOD PRESSURE: 64 MMHG | RESPIRATION RATE: 16 BRPM | TEMPERATURE: 99 F | WEIGHT: 165 LBS | HEART RATE: 95 BPM

## 2023-09-11 DIAGNOSIS — F41.9 ANXIETY DISORDER, UNSPECIFIED TYPE: ICD-10-CM

## 2023-09-11 DIAGNOSIS — F33.0 MILD RECURRENT MAJOR DEPRESSION (H): ICD-10-CM

## 2023-09-11 PROCEDURE — 99214 OFFICE O/P EST MOD 30 MIN: CPT | Performed by: PHYSICIAN ASSISTANT

## 2023-09-11 ASSESSMENT — ANXIETY QUESTIONNAIRES
3. WORRYING TOO MUCH ABOUT DIFFERENT THINGS: SEVERAL DAYS
6. BECOMING EASILY ANNOYED OR IRRITABLE: SEVERAL DAYS
IF YOU CHECKED OFF ANY PROBLEMS ON THIS QUESTIONNAIRE, HOW DIFFICULT HAVE THESE PROBLEMS MADE IT FOR YOU TO DO YOUR WORK, TAKE CARE OF THINGS AT HOME, OR GET ALONG WITH OTHER PEOPLE: SOMEWHAT DIFFICULT
4. TROUBLE RELAXING: SEVERAL DAYS
GAD7 TOTAL SCORE: 9
7. FEELING AFRAID AS IF SOMETHING AWFUL MIGHT HAPPEN: SEVERAL DAYS
2. NOT BEING ABLE TO STOP OR CONTROL WORRYING: SEVERAL DAYS
5. BEING SO RESTLESS THAT IT IS HARD TO SIT STILL: MORE THAN HALF THE DAYS
GAD7 TOTAL SCORE: 9
1. FEELING NERVOUS, ANXIOUS, OR ON EDGE: MORE THAN HALF THE DAYS

## 2023-09-11 ASSESSMENT — PATIENT HEALTH QUESTIONNAIRE - PHQ9
SUM OF ALL RESPONSES TO PHQ QUESTIONS 1-9: 13
10. IF YOU CHECKED OFF ANY PROBLEMS, HOW DIFFICULT HAVE THESE PROBLEMS MADE IT FOR YOU TO DO YOUR WORK, TAKE CARE OF THINGS AT HOME, OR GET ALONG WITH OTHER PEOPLE: SOMEWHAT DIFFICULT
SUM OF ALL RESPONSES TO PHQ QUESTIONS 1-9: 13

## 2023-09-11 ASSESSMENT — PAIN SCALES - GENERAL: PAINLEVEL: NO PAIN (0)

## 2023-09-11 ASSESSMENT — ENCOUNTER SYMPTOMS: NERVOUS/ANXIOUS: 1

## 2023-09-11 NOTE — NURSING NOTE
"Chief Complaint   Patient presents with    Depression    Anxiety    ekg results     discuss       Initial BP 98/64   Pulse 95   Temp 99  F (37.2  C) (Tympanic)   Resp 16   Ht 1.702 m (5' 7\")   Wt 74.8 kg (165 lb)   SpO2 98%   BMI 25.84 kg/m   Estimated body mass index is 25.84 kg/m  as calculated from the following:    Height as of this encounter: 1.702 m (5' 7\").    Weight as of this encounter: 74.8 kg (165 lb).  Medication Reconciliation: complete    GALA Salazar MA    "

## 2023-09-11 NOTE — PROGRESS NOTES
"  Assessment & Plan     Mild recurrent major depression (H)  Anxiety disorder, unspecified type  Consider Psychiatry consultation.   She has contact information for Behavioral Healthcare Clinician. I will also send Jena's information to [facility] [place].   Continue with the prozac. She declines new medications at this time.   - FLUoxetine (PROZAC) 20 MG capsule; Take 3 capsules (60 mg) by mouth daily  - Adult Mental Health  Referral; Future      EKG also reviewed. She has an incomplete RBBB, no concerns. Reassurance given.        BMI:   Estimated body mass index is 25.84 kg/m  as calculated from the following:    Height as of this encounter: 1.702 m (5' 7\").    Weight as of this encounter: 74.8 kg (165 lb).   Weight management plan: not discussed    Depression Screening Follow Up        9/11/2023    12:59 PM   PHQ   PHQ-9 Total Score 13   Q9: Thoughts of better off dead/self-harm past 2 weeks Several days   F/U: Thoughts of suicide or self-harm No   F/U: Safety concerns No         9/11/2023    12:59 PM   Last PHQ-9   1.  Little interest or pleasure in doing things 1   2.  Feeling down, depressed, or hopeless 2   3.  Trouble falling or staying asleep, or sleeping too much 1   4.  Feeling tired or having little energy 2   5.  Poor appetite or overeating 2   6.  Feeling bad about yourself 2   7.  Trouble concentrating 1   8.  Moving slowly or restless 1   Q9: Thoughts of better off dead/self-harm past 2 weeks 1   PHQ-9 Total Score 13   In the past two weeks have you had thoughts of suicide or self harm? No   Do you have concerns about your personal safety or the safety of others? No               Follow Up      Follow Up Actions Taken  Consult with [facility] [place], monitored patient safety and notified provider.  Mental Health Referral placed    Discussed the following ways the patient can remain in a safe environment:  remove things I could use to hurt myself:   and be around others      Kristen M. Kehr, PA-C  [facility]   Jena is a 42 year old, presenting for the following health issues:  Depression, Anxiety, and ekg results (discuss)        9/11/2023     1:08 PM   Additional Questions   Roomed by Rivera BRUCE MA       Jena is here today for anxiety an depression.   She was without insurance for a short time, she has lost contact with her counselor.   She plans to start back, but is due for refills of her medications.   She is also working with Pain Management.   She had an EKG recently after adding amitriptyline. There was a incomplete RBB found and she is also here for follow up for the abnormality seen.     She is taking prozac 60 mg daily. She stopped the hydroxyzine as she did not find it was effective.   Overall, she feels that the anxiety / depression is at baseline.   She continues to answer yes to suicidal ideation. No intent to act on her feelings.     Anxiety    History of Present Illness       Back Pain:  She presents for follow up of back pain. Patient's back pain is a chronic problem.  Location of back pain:  Right lower back, left lower back, right upper back, left upper back, right side of neck and left side of neck  Description of back pain: burning, sharp and stabbing  Back pain spreads: right buttocks, left buttocks, right thigh and left thigh    Since patient first noticed back pain, pain is: gradually worsening  Does back pain interfere with her job:  Yes       Mental Health Follow-up:  Patient presents to follow-up on Depression & Anxiety.Patient's depression since last visit has been:  Medium  The patient is having other symptoms associated with depression.  Patient's anxiety since last visit has been:  Bad  The patient is having other symptoms associated with anxiety.  Any significant life events: No  Patient is feeling anxious or having panic attacks.  Patient has no concerns about alcohol or drug use.    Vascular Disease:  She presents for follow up of vascular disease.     " She never takes nitroglycerin. She is not taking daily aspirin.    She eats 2-3 servings of fruits and vegetables daily.She consumes 2 sweetened beverage(s) daily.She exercises with enough effort to increase her heart rate 20 to 29 minutes per day.  She exercises with enough effort to increase her heart rate 3 or less days per week.   She is taking medications regularly.               Review of Systems   Psychiatric/Behavioral:  The patient is nervous/anxious.       Constitutional, HEENT, cardiovascular, pulmonary, GI, , musculoskeletal, neuro, skin, endocrine and psych systems are negative, except as otherwise noted.      Objective    BP 98/64   Pulse 95   Temp 99  F (37.2  C) (Tympanic)   Resp 16   Ht 1.702 m (5' 7\")   Wt 74.8 kg (165 lb)   SpO2 98%   BMI 25.84 kg/m    Body mass index is 25.84 kg/m .  Physical Exam   GENERAL: healthy, alert and no distress  PSYCH: mentation appears normal, affect normal/bright                      "

## 2023-10-04 ENCOUNTER — OFFICE VISIT (OUTPATIENT)
Dept: BEHAVIORAL HEALTH | Facility: CLINIC | Age: 43
End: 2023-10-04
Payer: COMMERCIAL

## 2023-10-04 DIAGNOSIS — F41.1 GENERALIZED ANXIETY DISORDER: ICD-10-CM

## 2023-10-04 DIAGNOSIS — F33.2 SEVERE EPISODE OF RECURRENT MAJOR DEPRESSIVE DISORDER, WITHOUT PSYCHOTIC FEATURES (H): Primary | ICD-10-CM

## 2023-10-04 DIAGNOSIS — F43.9 TRAUMA AND STRESSOR-RELATED DISORDER: ICD-10-CM

## 2023-10-04 PROCEDURE — 90832 PSYTX W PT 30 MINUTES: CPT

## 2023-10-04 ASSESSMENT — ANXIETY QUESTIONNAIRES
GAD7 TOTAL SCORE: 20
4. TROUBLE RELAXING: NEARLY EVERY DAY
IF YOU CHECKED OFF ANY PROBLEMS ON THIS QUESTIONNAIRE, HOW DIFFICULT HAVE THESE PROBLEMS MADE IT FOR YOU TO DO YOUR WORK, TAKE CARE OF THINGS AT HOME, OR GET ALONG WITH OTHER PEOPLE: SOMEWHAT DIFFICULT
7. FEELING AFRAID AS IF SOMETHING AWFUL MIGHT HAPPEN: MORE THAN HALF THE DAYS
GAD7 TOTAL SCORE: 20
2. NOT BEING ABLE TO STOP OR CONTROL WORRYING: NEARLY EVERY DAY
6. BECOMING EASILY ANNOYED OR IRRITABLE: NEARLY EVERY DAY
5. BEING SO RESTLESS THAT IT IS HARD TO SIT STILL: NEARLY EVERY DAY
3. WORRYING TOO MUCH ABOUT DIFFERENT THINGS: NEARLY EVERY DAY
1. FEELING NERVOUS, ANXIOUS, OR ON EDGE: NEARLY EVERY DAY
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
## 2023-10-08 ENCOUNTER — E-VISIT (OUTPATIENT)
Dept: FAMILY MEDICINE | Facility: CLINIC | Age: 43
End: 2023-10-08
Payer: COMMERCIAL

## 2023-10-08 DIAGNOSIS — F41.0 PANIC ATTACK: ICD-10-CM

## 2023-10-08 DIAGNOSIS — F41.9 ANXIETY DISORDER, UNSPECIFIED TYPE: ICD-10-CM

## 2023-10-08 PROCEDURE — 99422 OL DIG E/M SVC 11-20 MIN: CPT | Performed by: PHYSICIAN ASSISTANT

## 2023-10-08 RX ORDER — HYDROXYZINE HYDROCHLORIDE 25 MG/1
25 TABLET, FILM COATED ORAL 3 TIMES DAILY PRN
Qty: 60 TABLET | Refills: 0 | Status: SHIPPED | OUTPATIENT
Start: 2023-10-08 | End: 2023-11-08

## 2023-10-08 ASSESSMENT — ANXIETY QUESTIONNAIRES
GAD7 TOTAL SCORE: 9
1. FEELING NERVOUS, ANXIOUS, OR ON EDGE: SEVERAL DAYS
4. TROUBLE RELAXING: SEVERAL DAYS
7. FEELING AFRAID AS IF SOMETHING AWFUL MIGHT HAPPEN: SEVERAL DAYS
3. WORRYING TOO MUCH ABOUT DIFFERENT THINGS: SEVERAL DAYS
6. BECOMING EASILY ANNOYED OR IRRITABLE: SEVERAL DAYS
5. BEING SO RESTLESS THAT IT IS HARD TO SIT STILL: SEVERAL DAYS
GAD7 TOTAL SCORE: 9
2. NOT BEING ABLE TO STOP OR CONTROL WORRYING: NEARLY EVERY DAY

## 2023-10-08 ASSESSMENT — PATIENT HEALTH QUESTIONNAIRE - PHQ9
SUM OF ALL RESPONSES TO PHQ QUESTIONS 1-9: 14
SUM OF ALL RESPONSES TO PHQ QUESTIONS 1-9: 14
10. IF YOU CHECKED OFF ANY PROBLEMS, HOW DIFFICULT HAVE THESE PROBLEMS MADE IT FOR YOU TO DO YOUR WORK, TAKE CARE OF THINGS AT HOME, OR GET ALONG WITH OTHER PEOPLE: VERY DIFFICULT

## 2023-10-09 ASSESSMENT — ANXIETY QUESTIONNAIRES: GAD7 TOTAL SCORE: 9

## 2023-10-09 ASSESSMENT — PATIENT HEALTH QUESTIONNAIRE - PHQ9: SUM OF ALL RESPONSES TO PHQ QUESTIONS 1-9: 14

## 2023-10-27 ENCOUNTER — OFFICE VISIT (OUTPATIENT)
Dept: BEHAVIORAL HEALTH | Facility: CLINIC | Age: 43
End: 2023-10-27
Payer: COMMERCIAL

## 2023-10-27 ENCOUNTER — THERAPY VISIT (OUTPATIENT)
Dept: PHYSICAL THERAPY | Facility: CLINIC | Age: 43
End: 2023-10-27
Payer: COMMERCIAL

## 2023-10-27 DIAGNOSIS — R29.898 BILATERAL ARM WEAKNESS: ICD-10-CM

## 2023-10-27 DIAGNOSIS — M79.18 MYOFASCIAL PAIN: ICD-10-CM

## 2023-10-27 DIAGNOSIS — M54.10 RADICULAR PAIN OF UPPER EXTREMITY: ICD-10-CM

## 2023-10-27 DIAGNOSIS — F33.2 SEVERE EPISODE OF RECURRENT MAJOR DEPRESSIVE DISORDER, WITHOUT PSYCHOTIC FEATURES (H): Primary | ICD-10-CM

## 2023-10-27 DIAGNOSIS — F41.1 GENERALIZED ANXIETY DISORDER: ICD-10-CM

## 2023-10-27 DIAGNOSIS — M54.10 RADICULAR LEG PAIN: ICD-10-CM

## 2023-10-27 DIAGNOSIS — F43.9 TRAUMA AND STRESSOR-RELATED DISORDER: ICD-10-CM

## 2023-10-27 DIAGNOSIS — M54.2 NECK PAIN: ICD-10-CM

## 2023-10-27 PROCEDURE — 97112 NEUROMUSCULAR REEDUCATION: CPT | Mod: GP | Performed by: PHYSICAL THERAPIST

## 2023-10-27 PROCEDURE — 97110 THERAPEUTIC EXERCISES: CPT | Mod: GP | Performed by: PHYSICAL THERAPIST

## 2023-10-27 PROCEDURE — 90791 PSYCH DIAGNOSTIC EVALUATION: CPT

## 2023-10-27 PROCEDURE — 97161 PT EVAL LOW COMPLEX 20 MIN: CPT | Mod: GP | Performed by: PHYSICAL THERAPIST

## 2023-10-27 ASSESSMENT — COLUMBIA-SUICIDE SEVERITY RATING SCALE - C-SSRS
2. HAVE YOU ACTUALLY HAD ANY THOUGHTS OF KILLING YOURSELF?: YES
5. HAVE YOU STARTED TO WORK OUT OR WORKED OUT THE DETAILS OF HOW TO KILL YOURSELF? DO YOU INTEND TO CARRY OUT THIS PLAN?: NO
1. SINCE LAST CONTACT, HAVE YOU WISHED YOU WERE DEAD OR WISHED YOU COULD GO TO SLEEP AND NOT WAKE UP?: YES
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
## 2023-11-02 ENCOUNTER — E-VISIT (OUTPATIENT)
Dept: FAMILY MEDICINE | Facility: CLINIC | Age: 43
End: 2023-11-02
Payer: COMMERCIAL

## 2023-11-02 ENCOUNTER — OFFICE VISIT (OUTPATIENT)
Dept: BEHAVIORAL HEALTH | Facility: CLINIC | Age: 43
End: 2023-11-02
Payer: COMMERCIAL

## 2023-11-02 DIAGNOSIS — F41.1 GENERALIZED ANXIETY DISORDER: ICD-10-CM

## 2023-11-02 DIAGNOSIS — G43.909 MIGRAINE WITHOUT STATUS MIGRAINOSUS, NOT INTRACTABLE, UNSPECIFIED MIGRAINE TYPE: Primary | ICD-10-CM

## 2023-11-02 DIAGNOSIS — F43.9 TRAUMA AND STRESSOR-RELATED DISORDER: ICD-10-CM

## 2023-11-02 DIAGNOSIS — F33.2 SEVERE EPISODE OF RECURRENT MAJOR DEPRESSIVE DISORDER, WITHOUT PSYCHOTIC FEATURES (H): Primary | ICD-10-CM

## 2023-11-02 PROCEDURE — 90832 PSYTX W PT 30 MINUTES: CPT

## 2023-11-02 PROCEDURE — 99422 OL DIG E/M SVC 11-20 MIN: CPT | Performed by: PHYSICIAN ASSISTANT

## 2023-11-02 RX ORDER — SUMATRIPTAN 50 MG/1
50 TABLET, FILM COATED ORAL
Qty: 9 TABLET | Refills: 0 | Status: SHIPPED | OUTPATIENT
Start: 2023-11-02 | End: 2024-01-29

## 2023-11-02 NOTE — PATIENT INSTRUCTIONS
Thank you for choosing us for your care. I have placed an order for a prescription so that you can start treatment. View your full visit summary for details by clicking on the link below. Your pharmacist will able to address any questions you may have about the medication.     If you're not feeling better within 5-7 days, please schedule an appointment.  You can schedule an appointment right here in [facility], or call [phone]  If the visit is for the same symptoms as your eVisit, we'll refund the cost of your eVisit if seen within seven days.
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
## 2023-11-08 DIAGNOSIS — G43.909 MIGRAINE WITHOUT STATUS MIGRAINOSUS, NOT INTRACTABLE, UNSPECIFIED MIGRAINE TYPE: ICD-10-CM

## 2023-11-08 DIAGNOSIS — M54.2 NECK PAIN: ICD-10-CM

## 2023-11-08 DIAGNOSIS — M54.10 RADICULAR PAIN OF UPPER EXTREMITY: ICD-10-CM

## 2023-11-08 DIAGNOSIS — F41.9 ANXIETY DISORDER, UNSPECIFIED TYPE: ICD-10-CM

## 2023-11-08 DIAGNOSIS — F41.0 PANIC ATTACK: ICD-10-CM

## 2023-11-08 RX ORDER — AMITRIPTYLINE HYDROCHLORIDE 75 MG/1
TABLET ORAL
Qty: 30 TABLET | Refills: 0 | Status: SHIPPED | OUTPATIENT
Start: 2023-11-08 | End: 2023-11-21

## 2023-11-08 RX ORDER — HYDROXYZINE HYDROCHLORIDE 25 MG/1
25 TABLET, FILM COATED ORAL 3 TIMES DAILY PRN
Qty: 60 TABLET | Refills: 0 | Status: SHIPPED | OUTPATIENT
Start: 2023-11-08 | End: 2024-01-02

## 2023-11-08 NOTE — TELEPHONE ENCOUNTER
Name from pharmacy: Amitriptyline HCl 75 MG Oral Tablet         Will file in chart as: amitriptyline (ELAVIL) 75 MG tablet    Sig: TAKE 1 TABLET BY MOUTH AT BEDTIME    Disp: 30 tablet    Refills: 0    Start: 11/8/2023    Class: E-Prescribe    Non-formulary For: Radicular pain of upper extremity; Neck pain; Migraine without status migrainosus, not intractable, unspecified migraine type    Last ordered: 2 months ago (8/30/2023) by ZEYAD Messer CNP    Last refill: 9/30/2023    Rx #: [id number]    Tricyclic Agents ( Annual appt and no PHQ9) [id number]/2023 12:30 PM   Protocol Details Blood Pressure under 140/90 in past 12 mos    Recent (12 mo) or future (30 days) visit within authorizing provider's specialty    Medication is active on med list    Patient is age 18 or older    Patient is not pregnant    No positive pregnancy test on record in past 12 mos      To be filled at: [facility] - [address] - [id number]

## 2023-11-08 NOTE — TELEPHONE ENCOUNTER
Pending Prescriptions:                       Disp   Refills    amitriptyline (ELAVIL) 75 MG tablet [Phar*30 tab*0            Sig: TAKE 1 TABLET BY MOUTH AT BEDTIME   Last refill 11/08/23  Last office visit 8/30/23  Next appointment 11/21/23     triamcinolone (KENALOG) 0.1 % cream (Discontinued)

## 2023-11-09 ENCOUNTER — OFFICE VISIT (OUTPATIENT)
Dept: BEHAVIORAL HEALTH | Facility: CLINIC | Age: 43
End: 2023-11-09
Payer: COMMERCIAL

## 2023-11-09 DIAGNOSIS — F41.1 GENERALIZED ANXIETY DISORDER: ICD-10-CM

## 2023-11-09 DIAGNOSIS — F43.9 TRAUMA AND STRESSOR-RELATED DISORDER: ICD-10-CM

## 2023-11-09 DIAGNOSIS — F33.2 SEVERE EPISODE OF RECURRENT MAJOR DEPRESSIVE DISORDER, WITHOUT PSYCHOTIC FEATURES (H): Primary | ICD-10-CM

## 2023-11-09 PROCEDURE — 90832 PSYTX W PT 30 MINUTES: CPT
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
## 2023-11-09 NOTE — TELEPHONE ENCOUNTER
Chart reviewed - Request appears appropriate. Refilled for 30 day supply.     Bettie Mayers DNP, APRN, AGNP-C  [facility]      Please let patient know prescription can not be refilled with out a follow up visit.   Two partial refills have been given to her in December and February as detailed below by review of record of ePDMP    ePDMP -  Amphetamine-Dextroamphetamine  20MG / Capsule Extended Release 24 Hour  Rx# [id number] Stimulant Qty: 10  Days: 10  Refills: 0 Prescribed: 2/2/2022  Dispensed: 2/2/2022  Sold: 2/4/2022 BISHOP GARNER  [address] [facility] [id number] [employer].  [facility] # [id number]  [facility] [id number]     Amphetamine-Dextroamphetamine  20MG / Capsule Extended Release 24 Hour  Rx# [id number] Stimulant Qty: 20  Days: 20  Refills: 0 Prescribed: 12/23/2021  Dispensed: 12/23/2021  Sold: 12/24/2021 BISHOP GARNER  [address] [facility] [address] [employer].  [facility] [employer] # [id number]  [facility] [id number]

## 2023-11-13 ENCOUNTER — THERAPY VISIT (OUTPATIENT)
Dept: PHYSICAL THERAPY | Facility: CLINIC | Age: 43
End: 2023-11-13
Payer: COMMERCIAL

## 2023-11-13 DIAGNOSIS — M54.2 NECK PAIN: Primary | ICD-10-CM

## 2023-11-13 PROCEDURE — 97110 THERAPEUTIC EXERCISES: CPT | Mod: GP | Performed by: PHYSICAL THERAPIST

## 2023-11-13 PROCEDURE — 97112 NEUROMUSCULAR REEDUCATION: CPT | Mod: GP | Performed by: PHYSICAL THERAPIST

## 2023-11-21 ENCOUNTER — OFFICE VISIT (OUTPATIENT)
Dept: PALLIATIVE MEDICINE | Facility: CLINIC | Age: 43
End: 2023-11-21
Payer: COMMERCIAL

## 2023-11-21 VITALS — DIASTOLIC BLOOD PRESSURE: 75 MMHG | SYSTOLIC BLOOD PRESSURE: 109 MMHG | HEART RATE: 109 BPM

## 2023-11-21 DIAGNOSIS — M79.18 MYOFASCIAL PAIN: ICD-10-CM

## 2023-11-21 DIAGNOSIS — M54.10 RADICULAR PAIN OF UPPER EXTREMITY: ICD-10-CM

## 2023-11-21 DIAGNOSIS — M54.2 NECK PAIN: Primary | ICD-10-CM

## 2023-11-21 DIAGNOSIS — G43.909 MIGRAINE WITHOUT STATUS MIGRAINOSUS, NOT INTRACTABLE, UNSPECIFIED MIGRAINE TYPE: ICD-10-CM

## 2023-11-21 DIAGNOSIS — R29.898 BILATERAL ARM WEAKNESS: ICD-10-CM

## 2023-11-21 DIAGNOSIS — M54.10 RADICULAR LEG PAIN: ICD-10-CM

## 2023-11-21 PROCEDURE — 99214 OFFICE O/P EST MOD 30 MIN: CPT

## 2023-11-21 RX ORDER — METHOCARBAMOL 500 MG/1
500-1000 TABLET, FILM COATED ORAL 3 TIMES DAILY PRN
Qty: 120 TABLET | Refills: 2 | Status: SHIPPED | OUTPATIENT
Start: 2023-11-21 | End: 2024-02-27

## 2023-11-21 RX ORDER — AMITRIPTYLINE HYDROCHLORIDE 75 MG/1
TABLET ORAL
Qty: 30 TABLET | Refills: 2 | Status: SHIPPED | OUTPATIENT
Start: 2023-11-21 | End: 2024-02-27

## 2023-11-21 ASSESSMENT — PAIN SCALES - PAIN ENJOYMENT GENERAL ACTIVITY SCALE (PEG)
INTERFERED_GENERAL_ACTIVITY: 6
INTERFERED_ENJOYMENT_LIFE: 6
AVG_PAIN_PASTWEEK: 6
PEG_TOTALSCORE: 6

## 2023-11-21 ASSESSMENT — PAIN SCALES - GENERAL: PAINLEVEL: SEVERE PAIN (6)

## 2023-11-21 NOTE — PROGRESS NOTES
[facility] Pain Management     Date of visit: 11/21/2023      Assessment:   Jena Pinedo is a 42 year old female with a past medical history significant for migraines, pelvic pain, LBP, depression/anxiety who presents with complaints of widespread pain.      Widespread pain - She has pain in neck and low back that has been present for several years. Physical exam findings significant for myofascial tenderness in cervical paraspinals, trapezius, rhomboids, and lumbosacral region. Etiology of symptoms is most consistent with myofascial type pain, though it is possible underlying degenerative change of spine/joint may be contributing to some extent.   Mental Health - the patient's mental health concerns, specifically anxiety and depression, affect her experience of pain and contribute to her clinically significant distress. I think it is important to support mental health as part of the chronic pain treatment plan. She is currently working with mental health provider.     Assigned to [place] nursing team.     Visit Diagnoses:  1. Neck pain    2. Radicular pain of upper extremity    3. Migraine without status migrainosus, not intractable, unspecified migraine type    4. Myofascial pain    5. Bilateral arm weakness    6. Radicular leg pain        Plan:  Diagnosis reviewed, treatment option addressed, and risk/benifits discussed.  Self-care instructions given.  I am recommending a multidisciplinary treatment plan to help this patient better manage their pain.                  1.  Pain Physical Therapy:  YES - continue working with Haim Hou PT. She has appreciated pain PT approach and is excited to continue working with Haim.               2.  Pain Psychologist to address relaxation, behavioral change, coping style, and other factors important to improvement.  NO               3.  Diagnostic Studies:                            -Cervical MRI ordered at last visit, scheduled for 11/28/23. Advised I will  contact her in between visits for urgent/emergent MRI findings only. Otherwise, I will comment on/release results via [employer] and will plan to discuss results/potential treatment recommendations at follow up.   -EKG 8/30/23 - QT/QTc WNL.   -Will plan to check labs at next visit, no recent renal/hepatic function testing found in chart.               4.  Medication Management:   Tizanidine - change how you take medication. Continue 8 mg at bedtime as needed. Start methocarbamol 500-1000 mg up to three times daily as needed. Advised to start will 1 tab and increase to 2 as tolerated. Monitor for changes in pain levels. Allow 4-6 hours in between all muscle relaxant doses. Advised that she may contact clinic in between visits if she finds methocarbamol more effective than tizanidine, okay to change methocarbamol to four times daily as needed and discontinue tizanidine. Update prescriptions sent into pharmacy today.   Continue amitriptyline to 75 mg at bedtime. No significant change in pain, though she notes improvement with sleep. Reports she is able to fall asleep faster and stay asleep longer. Will plan to continue at current dose for now, may consider dosage adjustment again in future. Refilled today.               5.  Potential procedures: Please contact the clinic when you are ready to make an appointment for trigger point injections with my colleague, Dr. Alfonso.                6.  Referrals/Other orders: None               7.  Follow up with ZEYAD Messer CNP in 6-8 weeks    Review of Electronic Chart: Today I have also reviewed available medical information in the patient's medical record at [facility] ([facility]) and Care Everywhere (if available), including relevant provider notes, laboratory work, and imaging.     Bettie Mayers DNP, APRN, AGNP-C  [facility] Pain Management     -------------------------------------------------    Subjective:    Chief complaint:   Chief Complaint   Patient presents  with    Pain       Interval history:  Jena Pinedo is a 42 year old female last seen on 8/30/23.  They are a patient of mine seen in follow up.     Recommendations/plan at the last visit included:              1.  Pain Physical Therapy:  YES - I am referring for targeting stretching, strengthening, and home exercise plan to support functional goals/ADLs.  If you have not been contacted to schedule within 2 business days, please call [phone] to make an appointment.               2.  Pain Psychologist to address relaxation, behavioral change, coping style, and other factors important to improvement.  NO               3.  Diagnostic Studies:                            -Cervical MRI ordered today. She reports persistent BUE radicular symptoms. Weakness noted on exam in BUE.   -EKG 8/30/23 completed today - QT/QTc WNL, abnormal EKG demonstrates incomplete right BBB. Message sent to PCP to further address at follow up on finding and sones message sent to patient with update.               4.  Medication Management:   Continue tizanidine 8 mg up to three times daily as needed for muscle pain and spasms.  Refilled today.   Increase amitriptyline to 75 mg at bedtime. Monitor for changes in baseline pain levels, intensity of fluctuations, improvements in sleep.   Monitor for sedation - may cause dizziness or drowsiness. Be careful driving/moving around until you know effects of medication. Avoid concurrent alcohol use.               5.  Potential procedures: Please contact the clinic when you are ready to make an appointment for trigger point injections with my colleague, Dr. Alfonso.                6.  Referrals/Other orders: None               7.  Follow up with ZEYAD Messer CNP in 6-8 weeks    Since her last visit, Jena Pinedo reports:  -her pain is about the same as it was at last visit.   -She reports there is a fine line between her being able to function vs pain levels too high to engage  "in activity.   -She is following with pain PT, sees Haim WINKLER, reports they are working on self care, posture, breathing, balancing activities.   -She states she is excited to continue working with pain PT, as this is a very different approach to pain management.   -She is scheduled for cervical MRI on 11/28/23, ordered at last visit.   -She states it has taken her a while before being able to get in at [place] for MRI.   -Amitriptyline increased to 75 mg at bedtime, has not appreciate significant pain benefit but she notes falling asleep a little bit quicker than she was at lower dosage. She also reports sleeping longer than before.   -She would like to continue at this current dose for now.     Pain Information:   Pain rating: averages 6/10 on a 0-10 scale.      Interval history from last visit on 8/30/23:  -her pain is about the same as it was at last visit.   -She reports electrical shock sensation across upper back and shoulder blades.   -She reports changes in strength in arms/hands.   -She states she is having episodes of \"twitches,\" will drop objects on the floor.   -She reports intermittent radicular arm pain that stops at elbows.   -Last cervical MRI in 2013  -She also reports event last night where left leg started \"tingling,\" states front of leg and top of foot, lasted about 5 minutes, then shocking pains as sensation returned in foot for about 3 minutes.   -She states that this has only happened once.   -She will continue to monitor to see if this happens again.   -She also notes she is tripping over right foot intermittently and does fall.   -Denies red flag symptoms.   -She continues to take tizanidine 8 mg TID PRN.  Pain Information:              Pain rating: averages 6/10 on a 0-10 scale.        Interval history from last visit on 11/30/23:  -Her pain is worse than at last visit.   -She had TPI in the past and found these very helpful.  -She is interested in repeating once she has insurance again. "   -She uses ice and heat.   -She has tried lidocaine cream and patches, did not find these very helpful.   -She recently lost her job and will not have insurance for a month.  -She is a , on her feet a lot.   -She has two kids, 21 and 12, both boys.         HPI from initial visit with Dr. Moreno:   Chief complaints:  Chronic neck pain, right lower back pain, shoulder pain      History of Present illness:      Jena Pinedo is a 41 year old female clinic with complaint of generalized body pain.  Pain now mostly located in the base of her neck, upper back, and sometimes in the lower back.  Pain is dull aching and constant in nature.  In the past she has managed her pain with muscle relaxant (tizanidine) which she ran out.  She states that she has been having this pain for a long time.  She has tried multiple modalities of treatment with some benefit.  She underwent trigger point injection in the mid back and lower back which helped her some.  She has significant depression issues for which she has been seeing a therapist.  Due to COVID-19 issues, she is unable to schedule therapist.      Her MRI of the lumbar thoracic and cervical spine performed in 2014 and is unremarkable.     Trials of therapies  including      PT: Yes: having pain   TENs Unit: Yes:   Manual Medicine/Chiropractic: Yes:   Surgeries:No  Acupuncture: No  Other Integrative therapies: No  Behavioral interventions: No  Previous medication treatments included: Tizanidine  Previous pain interventions: likely trigger point injection            Current Pain Treatments:     Medications:      Tizanidine 4-8 mg TID PRN   Topical menthol 5% patches/cream  Amitriptyline      2. Other therapies:               Pain PT              Cervical MRI      Current MME: 0      Review of [place] Prescription Monitoring Program (): No concern for abuse or misuse of controlled medications based on this report. Reviewed - appears appropriate.       Annual Controlled Substance Agreement/UDS due date: N/A     Past pain treatments:     Injections - TPI     Medications:  Current Outpatient Medications   Medication Sig Dispense Refill    amitriptyline (ELAVIL) 75 MG tablet TAKE 1 TABLET BY MOUTH AT BEDTIME 30 tablet 2    FLUoxetine (PROZAC) 20 MG capsule Take 3 capsules (60 mg) by mouth daily 270 capsule 3    hydrOXYzine (ATARAX) 25 MG tablet TAKE 1 TABLET BY MOUTH THREE TIMES DAILY AS NEEDED FOR ANXIETY 60 tablet 0    levonorgestrel (MIRENA) 20 MCG/24HR IUD 1 each by Intrauterine route once.      methocarbamol (ROBAXIN) 500 MG tablet Take 1-2 tablets (500-1,000 mg) by mouth 3 times daily as needed for muscle spasms Allow 4-6 hours in between all muscle relaxants doses 120 tablet 2    SUMAtriptan (IMITREX) 50 MG tablet Take 1 tablet (50 mg) by mouth at onset of headache for migraine May repeat in 2 hours. Max 4 tablets/24 hours. 9 tablet 0    tiZANidine (ZANAFLEX) 4 MG tablet Take 2 tablets (8 mg) by mouth nightly as needed for muscle spasms Allow 4-6 hours in between all muscle relaxant doses. 60 tablet 2       Medical History: any changes in medical history since they were last seen? No      Objective:    Physical Exam:  Blood pressure 109/75, pulse 109, not currently breastfeeding.  Constitutional: Well developed, well nourished, appears stated age.  Gait: Intact   HEENT: Head atraumatic, normocephalic. Eyes without conjunctival injection or jaundice. Oropharynx clear. Neck supple. No obvious neck masses.  Skin: No rash, lesions, or petechiae of exposed skin.   Psychiatric/mental status: Alert, without lethargy or stupor. Speech fluent. Appropriate affect. Mood normal. Able to follow commands without difficulty.     Diagnostic Tests/Imaging/Labs:  Will plan for labs at follow up to check renal function at next visit.     BILLING TIME DOCUMENTATION:   The total TIME spent on this patient on the date of the encounter/appointment was 29 minutes.      TOTAL TIME  includes:   Time spent preparing to see the patient (reviewing records and tests)   Time spent face to face (or over the phone) with the patient   Time spent ordering tests, medications, procedures and referrals   Time spent Referring and communicating with other healthcare professionals   Time spent documenting clinical information in Epic

## 2023-11-21 NOTE — PATIENT INSTRUCTIONS
1.  Pain Physical Therapy:  YES - continue working with Haim Rubenjesse PT. She has appreciated pain PT approach and is excited to continue working with Haim.               2.  Pain Psychologist to address relaxation, behavioral change, coping style, and other factors important to improvement.  NO               3.  Diagnostic Studies:                            -Cervical MRI ordered at last visit, scheduled for 11/28/23. Advised I will contact her in between visits for urgent/emergent MRI findings only. Otherwise, I will comment on/release results via [employer] and will plan to discuss results/potential treatment recommendations at follow up.   -EKG 8/30/23 - QT/QTc WNL.   -Will plan to check labs at next visit, no recent renal/hepatic function testing found in chart.               4.  Medication Management:   Tizanidine - change how you take medication. Continue 8 mg at bedtime as needed. Start methocarbamol 500-1000 mg up to three times daily as needed. Advised to start will 1 tab and increase to 2 as tolerated. Monitor for changes in pain levels. Allow 4-6 hours in between all muscle relaxant doses. Advised that she may contact clinic in between visits if she finds methocarbamol more effective than tizanidine, okay to change methocarbamol to four times daily as needed and discontinue tizanidine. Update prescriptions sent into pharmacy today.   Continue amitriptyline to 75 mg at bedtime. No significant change in pain, though she notes improvement with sleep. Reports she is able to fall asleep faster and stay asleep longer. Will plan to continue at current dose for now, may consider dosage adjustment again in future. Refilled today.               5.  Potential procedures: Please contact the clinic when you are ready to make an appointment for trigger point injections with my colleague, Dr. Alfonso.                6.  Referrals/Other orders: None               7.  Follow up with ZEYAD Messer CNP in 6-8  weeks    ----------------------------------------------------------------  [facility] [place] Number:  [phone]   Call with any questions about your care and for scheduling assistance.   Calls are returned Monday through Friday between 8 AM and 4:30 PM. We usually get back to you within 2 business days depending on the issue/request.    If we are prescribing your medications:  For opioid medication refills, call the clinic or send a [employer] message 7 days in advance.  Please include:  Name of requested medication  Name of the pharmacy.  For non-opioid medications, call your pharmacy directly to request a refill. Please allow 3-4 days to be processed.   Per [place] State Law:  All controlled substance prescriptions must be filled within 30 days of being written.    For those controlled substances allowing refills, pickup must occur within 30 days of last fill.      We believe regular attendance is key to your success in our program!    Any time you are unable to keep your appointment we ask that you call us at least 24 hours in advance to cancel.This will allow us to offer the appointment time to another patient.   Multiple missed appointments may lead to dismissal from the clinic.

## 2023-11-28 ENCOUNTER — ANCILLARY PROCEDURE (OUTPATIENT)
Dept: MRI IMAGING | Facility: CLINIC | Age: 43
End: 2023-11-28
Payer: COMMERCIAL

## 2023-11-28 DIAGNOSIS — R29.898 BILATERAL ARM WEAKNESS: ICD-10-CM

## 2023-11-28 DIAGNOSIS — M54.10 RADICULAR PAIN OF UPPER EXTREMITY: ICD-10-CM

## 2023-11-28 PROCEDURE — 72141 MRI NECK SPINE W/O DYE: CPT | Mod: TC | Performed by: RADIOLOGY

## 2023-12-09 ENCOUNTER — MYC MEDICAL ADVICE (OUTPATIENT)
Dept: FAMILY MEDICINE | Facility: CLINIC | Age: 43
End: 2023-12-09

## 2024-01-01 DIAGNOSIS — F41.9 ANXIETY DISORDER, UNSPECIFIED TYPE: ICD-10-CM

## 2024-01-01 DIAGNOSIS — F41.0 PANIC ATTACK: ICD-10-CM

## 2024-01-02 RX ORDER — HYDROXYZINE HYDROCHLORIDE 25 MG/1
25 TABLET, FILM COATED ORAL 3 TIMES DAILY PRN
Qty: 90 TABLET | Refills: 0 | Status: SHIPPED | OUTPATIENT
Start: 2024-01-02 | End: 2024-01-29

## 2024-01-19 ASSESSMENT — ANXIETY QUESTIONNAIRES
GAD7 TOTAL SCORE: 17
7. FEELING AFRAID AS IF SOMETHING AWFUL MIGHT HAPPEN: SEVERAL DAYS
8. IF YOU CHECKED OFF ANY PROBLEMS, HOW DIFFICULT HAVE THESE MADE IT FOR YOU TO DO YOUR WORK, TAKE CARE OF THINGS AT HOME, OR GET ALONG WITH OTHER PEOPLE?: VERY DIFFICULT
3. WORRYING TOO MUCH ABOUT DIFFERENT THINGS: NEARLY EVERY DAY
6. BECOMING EASILY ANNOYED OR IRRITABLE: SEVERAL DAYS
IF YOU CHECKED OFF ANY PROBLEMS ON THIS QUESTIONNAIRE, HOW DIFFICULT HAVE THESE PROBLEMS MADE IT FOR YOU TO DO YOUR WORK, TAKE CARE OF THINGS AT HOME, OR GET ALONG WITH OTHER PEOPLE: VERY DIFFICULT
5. BEING SO RESTLESS THAT IT IS HARD TO SIT STILL: NEARLY EVERY DAY
1. FEELING NERVOUS, ANXIOUS, OR ON EDGE: NEARLY EVERY DAY
7. FEELING AFRAID AS IF SOMETHING AWFUL MIGHT HAPPEN: SEVERAL DAYS
GAD7 TOTAL SCORE: 17
4. TROUBLE RELAXING: NEARLY EVERY DAY
GAD7 TOTAL SCORE: 17
2. NOT BEING ABLE TO STOP OR CONTROL WORRYING: NEARLY EVERY DAY

## 2024-01-20 ENCOUNTER — HEALTH MAINTENANCE LETTER (OUTPATIENT)
Age: 44
End: 2024-01-20

## 2024-01-23 ASSESSMENT — PAIN SCALES - PAIN ENJOYMENT GENERAL ACTIVITY SCALE (PEG)
INTERFERED_ENJOYMENT_LIFE: 5
INTERFERED_GENERAL_ACTIVITY: 6
AVG_PAIN_PASTWEEK: 6
PEG_TOTALSCORE: 5.67

## 2024-01-24 ENCOUNTER — OFFICE VISIT (OUTPATIENT)
Dept: PALLIATIVE MEDICINE | Facility: CLINIC | Age: 44
End: 2024-01-24
Payer: COMMERCIAL

## 2024-01-24 VITALS — HEART RATE: 84 BPM | SYSTOLIC BLOOD PRESSURE: 107 MMHG | DIASTOLIC BLOOD PRESSURE: 67 MMHG

## 2024-01-24 DIAGNOSIS — G89.29 CHRONIC BILATERAL LOW BACK PAIN WITH BILATERAL SCIATICA: ICD-10-CM

## 2024-01-24 DIAGNOSIS — M54.2 NECK PAIN: ICD-10-CM

## 2024-01-24 DIAGNOSIS — M54.42 CHRONIC BILATERAL LOW BACK PAIN WITH BILATERAL SCIATICA: ICD-10-CM

## 2024-01-24 DIAGNOSIS — M79.18 MYOFASCIAL PAIN: ICD-10-CM

## 2024-01-24 DIAGNOSIS — M54.10 RADICULAR LEG PAIN: ICD-10-CM

## 2024-01-24 DIAGNOSIS — M54.12 CERVICAL RADICULAR PAIN: ICD-10-CM

## 2024-01-24 DIAGNOSIS — M54.41 CHRONIC BILATERAL LOW BACK PAIN WITH BILATERAL SCIATICA: ICD-10-CM

## 2024-01-24 DIAGNOSIS — M54.10 RADICULAR PAIN OF UPPER EXTREMITY: Primary | ICD-10-CM

## 2024-01-24 PROCEDURE — 99214 OFFICE O/P EST MOD 30 MIN: CPT

## 2024-01-24 ASSESSMENT — PAIN SCALES - GENERAL: PAINLEVEL: MODERATE PAIN (5)

## 2024-01-24 NOTE — PATIENT INSTRUCTIONS
1.  Pain Physical Therapy:  YES - referred again today. Previously saw Haim Hou PT, had to cancel appointments due to work schedule and is interested in re-engaging in therapy.  *If you have not been contacted to schedule within 2 business days, please call [phone] to make an appointment.                 2.  Pain Psychologist to address relaxation, behavioral change, coping style, and other factors important to improvement.  NO               3.  Diagnostic Studies:                            -Cervical MRI completed 11/28/23 - multilevel degenerative changes demonstrated that likely correlate with some of her current pain symptoms.   -EKG 8/30/23 - QT/QTc WNL.   -Will plan to check labs at future visit, no recent renal/hepatic function testing found in chart.   -Consider lumbar imaging at future follow up, pending outcome with pain PT for low back/leg pain symptoms.               4.  Medication Management:   Tizanidine - Continue 8 mg at bedtime as needed. Appreciates benefit, has sedation effects and avoids daytime use. Allow 4-6 hours in between all muscle relaxants.   Continue methocarbamol 500-1000 mg up to three times daily as needed. Continue use if beneficial and no concerns for daytime sedation. Allow 4-6 hours in between all muscle relaxant doses.   Continue amitriptyline to 75 mg at bedtime. No significant change in pain, though she notes improvement with sleep. Reports she is able to fall asleep faster and stay asleep longer. Will plan to continue at current dose for now, may consider dosage adjustment again in future.               5.  Potential procedures: Recommend C7-T1 epidural steroid injection to target radicular neck/upper back pain and numb pain radiating into arms. Orders placed to schedule.               6.  Referrals/Other orders: None               7.  Follow up with ZEYAD Messer CNP in 2-4 weeks after cervical  MEHRAN    ----------------------------------------------------------------  [facility] Number:  [phone]   Call with any questions about your care and for scheduling assistance.   Calls are returned Monday through Friday between 8 AM and 4:30 PM. We usually get back to you within 2 business days depending on the issue/request.    If we are prescribing your medications:  For opioid medication refills, call the clinic or send a [employer] message 7 days in advance.  Please include:  Name of requested medication  Name of the pharmacy.  For non-opioid medications, call your pharmacy directly to request a refill. Please allow 3-4 days to be processed.   Per [place] State Law:  All controlled substance prescriptions must be filled within 30 days of being written.    For those controlled substances allowing refills, pickup must occur within 30 days of last fill.      We believe regular attendance is key to your success in our program!    Any time you are unable to keep your appointment we ask that you call us at least 24 hours in advance to cancel.This will allow us to offer the appointment time to another patient.   Multiple missed appointments may lead to dismissal from the clinic.

## 2024-01-24 NOTE — PROGRESS NOTES
"[facility] Pain Management     Date of visit: 1/24/2024      Assessment:    Jena Pinedo is a 43 year old female with a past medical history significant for migraines, pelvic pain, LBP, depression/anxiety who presents with complaints of widespread pain.      Widespread pain - She has pain in neck and low back that has been present for several years. Physical exam findings significant for myofascial tenderness in cervical paraspinals, trapezius, rhomboids, and lumbosacral region. Etiology of symptoms is most consistent with myofascial type pain, though it is possible underlying degenerative change of spine/joint may be contributing to some extent.   Neck/BUE - Prominent neck pain with radicular symptoms, describes numbness in BUE, and sharp/\"electrical\" pain radiating into shoulders and in between scapula. Cervical MRI completed on 11/28/23, multilevel degenerative changes demonstrated, findings noted that could correlate/seem consistent with current pain symptoms. Etiology likely multifactorial, including underlying degenerative changes of cervical spine, suspect cervical radiculopathy component, overlying myofacial component.   Mental Health - the patient's mental health concerns, specifically anxiety and depression, affect her experience of pain and contribute to her clinically significant distress. I think it is important to support mental health as part of the chronic pain treatment plan. She is currently working with mental health provider.      Assigned to [facility] nursing team.     Visit Diagnoses:  1. Radicular pain of upper extremity    2. Myofascial pain    3. Radicular leg pain    4. Neck pain    5. Chronic bilateral low back pain with bilateral sciatica    6. Cervical radicular pain        Plan:  Diagnosis reviewed, treatment option addressed, and risk/benifits discussed.  Self-care instructions given.  I am recommending a multidisciplinary treatment plan to help this patient better manage " their pain.                  1.  Pain Physical Therapy:  YES - referred again today. Previously saw Haim Hou PT, had to cancel appointments due to work schedule and is interested in re-engaging in therapy.  *If you have not been contacted to schedule within 2 business days, please call [phone] to make an appointment.                 2.  Pain Psychologist to address relaxation, behavioral change, coping style, and other factors important to improvement.  NO               3.  Diagnostic Studies:                            -Cervical MRI completed 11/28/23 - multilevel degenerative changes demonstrated that likely correlate with some of her current pain symptoms.   -EKG 8/30/23 - QT/QTc WNL.   -Will plan to check labs at future visit, no recent renal/hepatic function testing found in chart.   -Consider lumbar imaging at future follow up, pending outcome with pain PT for low back/leg pain symptoms.               4.  Medication Management:   Tizanidine - Continue 8 mg at bedtime as needed. Appreciates benefit, has sedation effects and avoids daytime use. Allow 4-6 hours in between all muscle relaxants.   Continue methocarbamol 500-1000 mg up to three times daily as needed. Continue use if beneficial and no concerns for daytime sedation. Allow 4-6 hours in between all muscle relaxant doses.   Continue amitriptyline to 75 mg at bedtime. No significant change in pain, though she notes improvement with sleep. Reports she is able to fall asleep faster and stay asleep longer. Will plan to continue at current dose for now, may consider dosage adjustment again in future.               5.  Potential procedures: Recommend C7-T1 epidural steroid injection to target radicular neck/upper back pain and numb pain radiating into arms. Orders placed to schedule.               6.  Referrals/Other orders: None               7.  Follow up with ZEYAD Messer CNP in 2-4 weeks after cervical MEHRAN      Review of Electronic Chart:  Today I have also reviewed available medical information in the patient's medical record at [facility] ([facility]) and Care Everywhere (if available), including relevant provider notes, laboratory work, and imaging.     Bettie Mayers, SANDEE, APRN, JUICE-C  [facility] Pain Management     -------------------------------------------------    Subjective:    Chief complaint:   Chief Complaint   Patient presents with    Pain       Interval history:  Jena Pinedo is a 43 year old female last seen on 11/21/23.  They are a patient of mine seen in follow up.     Recommendations/plan at the last visit included:              1.  Pain Physical Therapy:  YES - continue working with Haim Hou PT. She has appreciated pain PT approach and is excited to continue working with Haim.               2.  Pain Psychologist to address relaxation, behavioral change, coping style, and other factors important to improvement.  NO               3.  Diagnostic Studies:                            -Cervical MRI ordered at last visit, scheduled for 11/28/23. Advised I will contact her in between visits for urgent/emergent MRI findings only. Otherwise, I will comment on/release results via [employer] and will plan to discuss results/potential treatment recommendations at follow up.   -EKG 8/30/23 - QT/QTc WNL.   -Will plan to check labs at next visit, no recent renal/hepatic function testing found in chart.               4.  Medication Management:   Tizanidine - change how you take medication. Continue 8 mg at bedtime as needed. Start methocarbamol 500-1000 mg up to three times daily as needed. Advised to start will 1 tab and increase to 2 as tolerated. Monitor for changes in pain levels. Allow 4-6 hours in between all muscle relaxant doses. Advised that she may contact clinic in between visits if she finds methocarbamol more effective than tizanidine, okay to change methocarbamol to four times daily as needed and discontinue tizanidine.  Update prescriptions sent into pharmacy today.   Continue amitriptyline to 75 mg at bedtime. No significant change in pain, though she notes improvement with sleep. Reports she is able to fall asleep faster and stay asleep longer. Will plan to continue at current dose for now, may consider dosage adjustment again in future. Refilled today.               5.  Potential procedures: Please contact the clinic when you are ready to make an appointment for trigger point injections with my colleague, Dr. Alfonso.                6.  Referrals/Other orders: None               7.  Follow up with ZEYAD Messer CNP in 6-8 weeks    Since her last visit, Jena Ann Khris reports:  -Her pain overall is about the same as last visit, had good days and bad days.   -She reports sharp radiating pain in neck/upper back area. Radicular arm symptoms present.   -She notes that pain has not necessarily changed but just been persistent for the past couple of years.   -She was previously referred to pain PT, was working with Haim Hou PT. She had to work a lot towards the end of the year, had to cancel pain PT appointments.   -She notes that she has lost her job after not answering phone on a Sunday from work to be called in to work.   -She states that some of the residents at the [facility] told her she should quit her job because she is not good at it.   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   -She states they had asked her to finish the work week, but she declined.   -She completed cervical MRI in between visits.   -She continues to have radicular arm symptoms, notably numbness, radicular pain into shoulders and upper back.  -She continues to have radicular pain low LE as well.   -She has not captured any distinct patterns with radicular leg pain.   -She states pain is variable.     Pain Information:   Pain rating: averages 7/10 on a 0-10 scale.      Interval history  "from last visit on 11/21/23:  -her pain is about the same as it was at last visit.   -She reports there is a fine line between her being able to function vs pain levels too high to engage in activity.   -She is following with pain PT, mikes Haim WINKLER, reports they are working on self care, posture, breathing, balancing activities.   -She states she is excited to continue working with pain PT, as this is a very different approach to pain management.   -She is scheduled for cervical MRI on 11/28/23, ordered at last visit.   -She states it has taken her a while before being able to get in at [facility] for MRI.   -Amitriptyline increased to 75 mg at bedtime, has not appreciate significant pain benefit but she notes falling asleep a little bit quicker than she was at lower dosage. She also reports sleeping longer than before.   -She would like to continue at this current dose for now.   Pain Information:              Pain rating: averages 6/10 on a 0-10 scale.        Interval history from last visit on 8/30/23:  -her pain is about the same as it was at last visit.   -She reports electrical shock sensation across upper back and shoulder blades.   -She reports changes in strength in arms/hands.   -She states she is having episodes of \"twitches,\" will drop objects on the floor.   -She reports intermittent radicular arm pain that stops at elbows.   -Last cervical MRI in 2013  -She also reports event last night where left leg started \"tingling,\" states front of leg and top of foot, lasted about 5 minutes, then shocking pains as sensation returned in foot for about 3 minutes.   -She states that this has only happened once.   -She will continue to monitor to see if this happens again.   -She also notes she is tripping over right foot intermittently and does fall.   -Denies red flag symptoms.   -She continues to take tizanidine 8 mg TID PRN.  Pain Information:              Pain rating: averages 6/10 on a 0-10 scale.        Interval " history from last visit on 11/30/23:  -Her pain is worse than at last visit.   -She had TPI in the past and found these very helpful.  -She is interested in repeating once she has insurance again.   -She uses ice and heat.   -She has tried lidocaine cream and patches, did not find these very helpful.   -She recently lost her job and will not have insurance for a month.  -She is a , on her feet a lot.   -She has two kids, 21 and 12, both boys.         HPI from initial visit with Dr. Moreno:   Chief complaints:  Chronic neck pain, right lower back pain, shoulder pain      History of Present illness:      Jena Pinedo is a 41 year old female clinic with complaint of generalized body pain.  Pain now mostly located in the base of her neck, upper back, and sometimes in the lower back.  Pain is dull aching and constant in nature.  In the past she has managed her pain with muscle relaxant (tizanidine) which she ran out.  She states that she has been having this pain for a long time.  She has tried multiple modalities of treatment with some benefit.  She underwent trigger point injection in the mid back and lower back which helped her some.  She has significant depression issues for which she has been seeing a therapist.  Due to COVID-19 issues, she is unable to schedule therapist.      Her MRI of the lumbar thoracic and cervical spine performed in 2014 and is unremarkable.     Trials of therapies  including      PT: Yes: having pain   TENs Unit: Yes:   Manual Medicine/Chiropractic: Yes:   Surgeries:No  Acupuncture: No  Other Integrative therapies: No  Behavioral interventions: No  Previous medication treatments included: Tizanidine  Previous pain interventions: likely trigger point injection            Current Pain Treatments:     Medications:      Tizanidine 8 mg at bedtime PRN   Methocarbamol 500-1000 mg TID PRN, recommend trial for daytime use   Topical menthol 5% patches/cream  Amitriptyline 75  mg at bedtime - notably benefit with sleep      2. Other therapies:               Pain PT             C7-T1 MEHRAN     Current MME: 0      Review of [place] Prescription Monitoring Program (): No concern for abuse or misuse of controlled medications based on this report. Reviewed - appears appropriate.      Annual Controlled Substance Agreement/UDS due date: N/A     Past pain treatments:  Injections - TPI    Cervical MRI     Medications:  Current Outpatient Medications   Medication Sig Dispense Refill    amitriptyline (ELAVIL) 75 MG tablet TAKE 1 TABLET BY MOUTH AT BEDTIME 30 tablet 2    FLUoxetine (PROZAC) 20 MG capsule Take 3 capsules (60 mg) by mouth daily 270 capsule 3    hydrOXYzine HCl (ATARAX) 25 MG tablet TAKE 1 TABLET BY MOUTH THREE TIMES DAILY AS NEEDED FOR ANXIETY 90 tablet 0    levonorgestrel (MIRENA) 20 MCG/24HR IUD 1 each by Intrauterine route once.      methocarbamol (ROBAXIN) 500 MG tablet Take 1-2 tablets (500-1,000 mg) by mouth 3 times daily as needed for muscle spasms Allow 4-6 hours in between all muscle relaxants doses 120 tablet 2    SUMAtriptan (IMITREX) 50 MG tablet Take 1 tablet (50 mg) by mouth at onset of headache for migraine May repeat in 2 hours. Max 4 tablets/24 hours. 9 tablet 0    tiZANidine (ZANAFLEX) 4 MG tablet Take 2 tablets (8 mg) by mouth nightly as needed for muscle spasms Allow 4-6 hours in between all muscle relaxant doses. 60 tablet 2       Medical History: any changes in medical history since they were last seen? No      Objective:    Physical Exam:  Blood pressure 107/67, pulse 84, not currently breastfeeding.  Constitutional: Well developed, well nourished, appears stated age.  Gait: Intact   HEENT: Head atraumatic, normocephalic. Eyes without conjunctival injection or jaundice. Oropharynx clear. Neck supple. No obvious neck masses.  Skin: No rash, lesions, or petechiae of exposed skin.   Psychiatric/mental status: Alert, without lethargy or stupor. Speech fluent.  Appropriate affect. Mood normal. Able to follow commands without difficulty.     Diagnostic Tests/Imaging/Labs:  MR CERVICAL SPINE WITHOUT CONTRAST 11/28/2023 10:25 AM     INDICATION: Radicular pain of upper extremity. Bilateral arm weakness.     TECHNIQUE: Noncontrast MRI images of the cervical spine.  CONTRAST:  None     COMPARISON: 8/7/2013     FINDINGS: Minimal anterolisthesis of C4 on C5 and C7 on T1. Slight  chronic compression of the upper anterior C7 and T3 endplates, new  versus 2013. Otherwise normal vertebral body heights. No edema, benign  marrow. Slightly low-lying cerebellar tonsils without Chiari  malformation or syrinx. Normal spinal cord.     C2-C3: Preserved airspace. No herniation. Unremarkable facets. No  stenosis.     C3-C4: Preserved interspace. No herniation. Moderate right facet  arthropathy. No central stenosis or left foraminal narrowing. Mild  right foraminal narrowing.     C4-C5: Slight interspace narrowing. Minor annular bulging and  uncovertebral joint degeneration. Severe right facet arthropathy. No  central stenosis or left foraminal narrowing. Mild right foraminal  narrowing.     C5-C6: Normal disc height. Small central protrusion. Mild to moderate  right facet arthropathy. Minor spinal canal narrowing. Mild right  foraminal narrowing. No left foraminal narrowing.     C6-C7: Normal disc height. No herniation. Unremarkable facets. No  stenosis.     C7-T1: Normal disc height. No herniation. Moderate to severe left and  mild right facet arthropathy. No central stenosis. Moderate left  foraminal narrowing. No right foraminal narrowing.                                                                      IMPRESSION:  1.  Interval development of degenerative changes since 2013. No  high-grade central spinal canal narrowing, spinal cord compression or  spinal cord signal abnormality.  2.  Subtle chronic deformities of the upper anterior C7 and T3  vertebral bodies are new since 2013, and  lack edema consistent with  now chronic injuries. No edema to indicate recent cervical spine  injury.  3.  Mild disc and moderate to advanced facet degeneration. Minor  central spinal canal narrowing at C5-C6.  4.  Degenerative foraminal narrowing is mild on the right at C3-C4,  C4-C5, C5-C6, and moderate on the left at C7-T1.     NATALIE VELIZ MD     BILLING TIME DOCUMENTATION:   The total TIME spent on this patient on the date of the encounter/appointment was 37 minutes.      TOTAL TIME includes:   Time spent preparing to see the patient (reviewing records and tests)   Time spent face to face (or over the phone) with the patient   Time spent ordering tests, medications, procedures and referrals   Time spent Referring and communicating with other healthcare professionals   Time spent documenting clinical information in Epic

## 2024-01-25 ENCOUNTER — TELEPHONE (OUTPATIENT)
Dept: PALLIATIVE MEDICINE | Facility: CLINIC | Age: 44
End: 2024-01-25
Payer: COMMERCIAL

## 2024-01-25 ENCOUNTER — OFFICE VISIT (OUTPATIENT)
Dept: BEHAVIORAL HEALTH | Facility: CLINIC | Age: 44
End: 2024-01-25
Payer: COMMERCIAL

## 2024-01-25 DIAGNOSIS — F43.9 TRAUMA AND STRESSOR-RELATED DISORDER: ICD-10-CM

## 2024-01-25 DIAGNOSIS — F41.1 GENERALIZED ANXIETY DISORDER: ICD-10-CM

## 2024-01-25 DIAGNOSIS — M54.12 CERVICAL RADICULOPATHY: Primary | ICD-10-CM

## 2024-01-25 DIAGNOSIS — F33.2 SEVERE EPISODE OF RECURRENT MAJOR DEPRESSIVE DISORDER, WITHOUT PSYCHOTIC FEATURES (H): Primary | ICD-10-CM

## 2024-01-25 PROCEDURE — 90791 PSYCH DIAGNOSTIC EVALUATION: CPT

## 2024-01-25 NOTE — TELEPHONE ENCOUNTER
"Screening Questions for Radiology Injections:    Injection to be done at which interventional clinic site? [facility]    If choosing [facility] for location, please inform patient:  \"[facility] is a Hospital based clinic. Before your visit, you should check with your insurance about how it covers the charges for facility services in a hospital-based clinic.     Procedure ordered by     Procedure ordered? Cervical 7-Thoracic 1 epidural steroid injection   Transforaminal Cervical MEHRAN - Send to [facility] - No [facility] providers perform this procedure    What insurance would patient like us to bill for this procedure? BC   IF SCHEDULING IN [place] PAIN OR SPINE PLEASE SCHEDULE AT LEAST 7-10 BUSINESS DAYS OUT SO A PA CAN BE OBTAINED  Worker's comp or MVA (motor vehicle accident) -Any injection DO NOT SCHEDULE and route to Pam Talavera.    Invoiceable insurance - For SI joint injections, DO NOT SCHEDULE and route to Adriana Castillo.     ALL BCBS, [employer] and [employer] - DO NOT SCHEDULE and route to Adriana Castillo  MEDICA- facet joint injections, route to [facility]    Is patient scheduled at [facility]?    If YES, route every encounter to [facility] CARE NAVIGATION POOL [[phone]]    Is an  needed? No     Patient has a  home? (Review Grid) YES: informed     Any chance of pregnancy? Not Applicable   If YES, do NOT schedule and route to RN pool  - Dr. Ledbetter route to Skyla Glass and PM&R Nurse  [[id number]]      Is patient actively being treated for cancer or immunocompromised? No  If YES, do NOT schedule and route to RN pool/ Dr. Ledbetter's Team    Does the patient have a bleeding or clotting disorder? No   If YES, okay to schedule AND route to RN  / Dr. Ledbetter's Team   (For any patients with platelet count <100, RN must forward to provider)    Is patient taking any Blood Thinners OR Antiplatelet medication?  No   If hold " needed, do NOT schedule, route to RN pool/ Dr. Ledbetter's Team  Examples:   Blood Thinners: (Coumadin, Warfarin, Jantoven, Pradaxa, Xarelto, Eliquis, Edoxaban, Enoxaparin, Lovenox, Heparin, Arixtra, Fondaparinux or Fragmin)  Antiplatelet Medications: (Plavix, Brilinta or Effient)     Is patient taking any aspirin products (includes Excedrin and Fiorinal)? No   If more than 325mg/day, OK to schedule; Instruct Pt to decrease to less than 325 mg for 7 days AND route to RN pool/ Dr. Ledbetter's Team   For CERVICAL procedures, hold all aspirin products for 6 days.   Tell Pt that if aspirin product is not held for 6 days, the procedure WILL BE cancelled.     Any allergies to contrast dye, iodine, shellfish, or numbing and steroid medications? No  If YES, schedule and add allergy information to appointment notes AND route to the RN pool/ Dr. Ledbetter's Team  If MEHRAN and Contrast Dye / Iodine Allergy? DO NOT SCHEDULE, route to RN pool/ Dr. Ledbetter's Team  Allergies: Nicotine     Does patient have an active infection or treated for one within the past week? No  Is patient currently taking any antibiotics or steroid medications?  No   For patients on chronic, preventative, or prophylactic antibiotics, procedures may be scheduled.   For patients on antibiotics for active or recent infection, schedule 4 days after completed.  For patients on steroid medications, schedule 4 days after completed.     Has the patient had a flu shot or any other vaccinations within the past 7 days? No  If yes, explain that for the vaccine to work best they need to:     wait 1 week before and 1 week after getting any Vaccine  wait 1 week before and 2 weeks after getting any Covid Vaccine   If patient has concerns about the timing, send to RN pool/ Dr. Ledbetter's Team    Does patient have an MRI/CT?  YES: 2023 Include Date and Check Procedure Scheduling Grid to see if required.  Was the MRI/CT done within the last 3 years?  Yes   If no route to RN Pool/   Anatoly's Team  If yes, where was the MRI/CT done? [facility]   Refer to PACS Transmissions list for approved external locations and route to RN Pool [facility]/ Dr. Brunsons Team  If MRI was not done at approved external location do NOT schedule and route to  pool/ Dr. Brunsons Team    If patient has an imaging disc, the injection MAY be scheduled but patient must bring disc to appt or appt will be cancelled.    Is patient able to transfer to a procedure table with minimal or no assistance? Yes   If no, do NOT schedule and route to  Pool/ Dr. Brunsons Team    Procedure Specific Instructions:  If celiac plexus block, informed patient NPO for 6 hours and that it is okay to take medications with sips of water, especially blood pressure medications Not Applicable       If this is for a cervical procedure, informed patient that aspirin needs to be held for 6 days.   Not Applicable    Sedation, If Sedation is ordered for any procedure, patient must be NPO for 6 hours prior to procedure Not Applicable    If IV needed:  Do not schedule procedures requiring IV placement in the first appointment of the day or first appointment after lunch. Do NOT schedule at 0745, 0815 or 1245.   Instructed patient to arrive 30 minutes early for IV start if required. (Check Procedure Scheduling Grid)  Not Applicable    Reminders:  If you are started on any steroids or antibiotics between now and your appointment, you must contact us because the procedure may need to be cancelled.  Yes    As a reminder, receiving steroids can decrease your body's ability to fight infection.   Would you still like to move forward with scheduling the injection?  Yes    IV Sedation is not provided for procedures. If oral anti-anxiety medication is needed, the patient should request this from their referring provider.    Instruct patient to arrive as directed prior to the scheduled appointment time:  If IV needed 30 minutes before appointment time     For patients  85 or older we recommend having an adult stay w/ them for the remainder of the day.     If the patient is Diabetic, remind them to bring their glucometer.      Does the patient have any questions?  NO  Christelle Chopra  [facility]
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
## 2024-01-26 NOTE — TELEPHONE ENCOUNTER
Authorization Number: [id number]  Review Date: 1/26/2024 1:37:41 PM  Expiration Date: 3/26/2024  Status: Your case has been Approved.  The prior authorization you submitted, Case [id number], has been received. Additional case status notifications will be sent if you opted in for email notifications. Thank you.        OKAY TO SCHEDULE MEHRDAD WITH DR. CLAIRE Smith   [facility]

## 2024-01-28 DIAGNOSIS — G43.909 MIGRAINE WITHOUT STATUS MIGRAINOSUS, NOT INTRACTABLE, UNSPECIFIED MIGRAINE TYPE: ICD-10-CM

## 2024-01-28 DIAGNOSIS — F41.9 ANXIETY DISORDER, UNSPECIFIED TYPE: ICD-10-CM

## 2024-01-28 DIAGNOSIS — F41.0 PANIC ATTACK: ICD-10-CM

## 2024-01-29 RX ORDER — SUMATRIPTAN 50 MG/1
TABLET, FILM COATED ORAL
Qty: 9 TABLET | Refills: 0 | Status: SHIPPED | OUTPATIENT
Start: 2024-01-29 | End: 2024-04-08

## 2024-01-29 RX ORDER — HYDROXYZINE HYDROCHLORIDE 25 MG/1
25 TABLET, FILM COATED ORAL 3 TIMES DAILY PRN
Qty: 90 TABLET | Refills: 3 | Status: SHIPPED | OUTPATIENT
Start: 2024-01-29

## 2024-01-30 ENCOUNTER — RADIOLOGY INJECTION OFFICE VISIT (OUTPATIENT)
Dept: PALLIATIVE MEDICINE | Facility: CLINIC | Age: 44
End: 2024-01-30
Payer: COMMERCIAL

## 2024-01-30 VITALS — OXYGEN SATURATION: 95 % | HEART RATE: 79 BPM | DIASTOLIC BLOOD PRESSURE: 50 MMHG | SYSTOLIC BLOOD PRESSURE: 91 MMHG

## 2024-01-30 DIAGNOSIS — M54.12 CERVICAL RADICULAR PAIN: ICD-10-CM

## 2024-01-30 DIAGNOSIS — M54.2 NECK PAIN: ICD-10-CM

## 2024-01-30 DIAGNOSIS — M54.12 CERVICAL RADICULOPATHY: Primary | ICD-10-CM

## 2024-01-30 DIAGNOSIS — M54.10 RADICULAR PAIN OF UPPER EXTREMITY: ICD-10-CM

## 2024-01-30 PROCEDURE — 62321 NJX INTERLAMINAR CRV/THRC: CPT | Performed by: PAIN MEDICINE

## 2024-01-30 RX ORDER — DEXAMETHASONE SODIUM PHOSPHATE 10 MG/ML
10 INJECTION, SOLUTION INTRAMUSCULAR; INTRAVENOUS ONCE
Status: COMPLETED | OUTPATIENT
Start: 2024-01-30 | End: 2024-01-30

## 2024-01-30 RX ADMIN — DEXAMETHASONE SODIUM PHOSPHATE 10 MG: 10 INJECTION, SOLUTION INTRAMUSCULAR; INTRAVENOUS at 13:34

## 2024-01-30 ASSESSMENT — PAIN SCALES - GENERAL
PAINLEVEL: MODERATE PAIN (4)
PAINLEVEL: MODERATE PAIN (5)

## 2024-01-30 NOTE — PROGRESS NOTES
[facility] - Procedure Note    Date of Visit: 1/30/2024    Procedure performed: C7-T1 interlaminar epidural steroid injection with fluoroscopic guidance  Diagnosis: Cervical radiculitis/radiculopathy  : Wyatt Alfonso MD  Anesthesia: none    Indications: Jena Pinedo is a 43 year old female who is seen at the request  for cervical epidural steroid injection. The patient describes neck pain radiating to her rue. The patient has been exhibiting symptoms consistent with cervical intraspinal inflammation and radiculopathy. Symptoms have been persistent, disabling, and intermittently severe. The patient reports minimal improvement with conservative treatment, including meds/pt.    Cervical MRI  reviewed    Allergies:      Allergies   Allergen Reactions    Nicotine      Patch, arm swelling, itchy, red        Vitals:  BP 91/50   Pulse 79   SpO2 95%     Review of Systems: The patient denies recent fever, chills, illness, use of antibiotics or anticoagulants. All other 10-point review of systems negative.     Procedure: The procedure and risks were explained, and informed written consent was obtained from the patient. Risks include but are not limited to: infection, bleeding, increased pain, and damage to soft tissue, nerve, muscle, and vasculature structures. After getting informed consent, patient was brought into the procedure suite and was placed in a prone position on the procedure table. A Pause for the Cause was performed. Patient was prepped and draped in sterile fashion.     The C7-T1 interspace was identified with use of fluoroscopy in AP view. A 25-gauge, 1.5 inch needle was used to anesthetize the skin and subcutaneous tissue entry site with a total of 2 ml of 1% lidocaine. Under fluoroscopic visualization, a 22-gauge, 3.5 inch Tuohy epidural needle was slowly advanced towards the epidural space a few millimeters  of midline. The latter part of the needle advancement was  guided with fluoroscopy in the lateral view. The epidural space was identified using loss of resistance technique. After negative aspiration for heme and cerebrospinal fluid, a total of 1 mL of Omnipaque was injected to confirm needle placement. 9 mL of contrast was wasted. Epidurogram confirmed spread within the posterior epidural space. 2 ml of  NS,1 ml 10mg/ml of dexamethasone, and 1 ml of preservative free 0.25% bupivacaine was injected. The needle was removed.  Images were saved to PACS.    The patient tolerated the procedure well, and there was no evidence of procedural complications. No new sensory or motor deficits were noted following the procedure. The patient was stable and able to ambulate on discharge home. Post-procedure instructions were provided.     Pre-procedure pain score: 5/10 in the neck, 5/10 in the arm  Post-procedure pain score: 4/10 in the neck, 4/10 in the arm    Assessment/Plan: Jena Pinedo is a 43 year old female s/p cervical interlaminar epidural steroid injection today for cervical spondylosis and radiculitis/radiculopathy.     Following today's procedure, the patient was advised to contact the [facility] for any of the following:   Fever, chills, or night sweats   New onset of pain, numbness, or weakness   Any questions/concerns regarding the procedure  If unable to contact the Pain Center, the patient was instructed to go to a local Emergency Room for any complications.     Follow-up with provider in 2 weeks for post-procedure evaluation.    Wyatt Alfonso MD   Pain Management    [facility]

## 2024-01-30 NOTE — PATIENT INSTRUCTIONS
[facility]   Procedure Discharge Instructions    Today you saw:  Dr. Wyatt Alfonso     You had an:  Epidural steroid injection   -cervical     Medications used:  Lidocaine   Bupivacaine   Dexamethasone Omnipaque  Normal saline        Be cautious when walking. Numbness and/or weakness in the lower extremities may occur for up to 6-8 hours after the procedure due to effect of the local anesthetic  Do not drive for 6 hours. The effect of the local anesthetic could slow your reflexes.   You may resume your regular activities after 24 hours  Avoid strenuous activity for the first 24 hours  You may shower, however avoid swimming, tub baths or hot tubs for 24 hours following your procedure  You may have a mild to moderate increase in pain for several days following the injection.  It may take up to 14 days for the steroid medication to start working although you may feel the effect as early as a few days after the procedure.     You may use ice packs for 10-15 minutes, 3 to 4 times a day at the injection site for comfort  Do not use heat to painful areas for 6 to 8 hours. This will give the local anesthetic time to wear off and prevent you from accidentally burning your skin.   Unless you have been directed to avoid the use of anti-inflammatory medications (NSAIDS), you may use medications such as ibuprofen, Aleve or Tylenol for pain control if needed.   If you were fasting, you may resume your normal diet and medications after the procedure  If you have diabetes, check your blood sugar more frequently than usual as your blood sugar may be higher than normal for 10-14 days following a steroid injection. Contact your doctor who manages your diabetes if your blood sugar is higher than usual  Possible side effects of steroids that you may experience include flushing, elevated blood pressure, increased appetite, mild headaches and restlessness.  All of these symptoms will get better with  time.  If you experience any of the following, call the Pain Clinic during work hours (Mon-Friday 8-4:30 pm) at [phone] or the Provider Line after hours at [phone]:  -Fever over 100 degree F  -Swelling, bleeding, redness, drainage, warmth at the injection site  -Progressive weakness or numbness in your legs or arms  -Loss of bowel or bladder function  -Unusual headache that is not relieved by Tylenol or other pain reliever  -Unusual new onset of pain that is not improving

## 2024-01-30 NOTE — NURSING NOTE
Pre-procedure Intake  If YES to any questions or NO to having a   Please complete laminated checklist and leave on the computer keyboard for Provider, verbally inform provider if able.    For SCS Trial, RFA's or any sedation procedure:  Have you been fasting? NA  If yes, for how long?     Are you taking any any blood thinners such as Coumadin, Warfarin, Jantoven, Pradaxa Xarelto, Eliquis, Edoxaban, Enoxaparin, Lovenox, Heparin, Arixtra, Fondaparinux, or Fragmin? OR Antiplatelet medication such as Plavix, Brilinta, or Effient?   No   If yes, when did you take your last dose?     Do you take aspirin?  No  If cervical procedure, have you held aspirin for 6 days?   NA    Do you have any allergies to contrast dye, iodine, steroid and/or numbing medications?  NO    Are you currently taking antibiotics or have an active infection?  NO    Have you had a fever/elevated temperature within the past week? NO    Are you currently taking oral steroids? NO    Do you have a ? Yes    Are you pregnant or breastfeeding?  NO    Have you received the COVID-19 vaccine? Yes  If yes, was it your 1st, 2nd or only dose needed?   Date of most recent vaccine: 8/9/21    Notify provider and RNs if systolic BP >170, diastolic BP >100, P >100 or O2 sats < 90%

## 2024-01-30 NOTE — NURSING NOTE
Discharge Information    IV Discontiued Time:  NA    Amount of Fluid Infused:  NA    Discharge Criteria = When patient returns to baseline or as per MD order    Consciousness:  Pt is fully awake    Circulation:  BP +/- 20% of pre-procedure level    Respiration:  Patient is able to breathe deeply    O2 Sat:  Patient is able to maintain O2 Sat >92% on room air    Activity:  Moves 4 extremities on command    Ambulation:  Patient is able to stand and walk or stand and pivot into wheelchair    Dressing:  Clean/dry or No Dressing    Notes:   Discharge instructions and AVS given to patient    Patient meets criteria for discharge?  YES    Admitted to PCU?  No    Responsible adult present to accompany patient home?  Yes    Signature/Title:    keaton yao RN  RN Care Coordinator  [facility] [place]

## 2024-02-01 ENCOUNTER — THERAPY VISIT (OUTPATIENT)
Dept: PHYSICAL THERAPY | Facility: CLINIC | Age: 44
End: 2024-02-01
Payer: COMMERCIAL

## 2024-02-01 DIAGNOSIS — G89.29 CHRONIC BILATERAL LOW BACK PAIN WITH BILATERAL SCIATICA: ICD-10-CM

## 2024-02-01 DIAGNOSIS — M54.12 CERVICAL RADICULAR PAIN: ICD-10-CM

## 2024-02-01 DIAGNOSIS — M54.42 CHRONIC BILATERAL LOW BACK PAIN WITH BILATERAL SCIATICA: ICD-10-CM

## 2024-02-01 DIAGNOSIS — M54.10 RADICULAR LEG PAIN: ICD-10-CM

## 2024-02-01 DIAGNOSIS — M79.18 MYOFASCIAL PAIN: ICD-10-CM

## 2024-02-01 DIAGNOSIS — M54.41 CHRONIC BILATERAL LOW BACK PAIN WITH BILATERAL SCIATICA: ICD-10-CM

## 2024-02-01 DIAGNOSIS — M54.2 NECK PAIN: ICD-10-CM

## 2024-02-01 DIAGNOSIS — M54.10 RADICULAR PAIN OF UPPER EXTREMITY: ICD-10-CM

## 2024-02-01 PROCEDURE — 97112 NEUROMUSCULAR REEDUCATION: CPT | Mod: GP | Performed by: PHYSICAL THERAPIST

## 2024-02-01 PROCEDURE — 97110 THERAPEUTIC EXERCISES: CPT | Mod: GP | Performed by: PHYSICAL THERAPIST

## 2024-02-01 NOTE — PROGRESS NOTES
PLAN  Continue therapy per current plan of care.    Beginning/End Dates of Progress Note Reporting Period:  10/27/23 to 02/01/2024    Referring Provider:  Bettie Mayers       02/01/24 0500   Appointment Info   Signing clinician's name / credentials Jay Hou PT   Total/Authorized Visits E&T   Visits Used 3   Medical Diagnosis Neck pain, LBP   PT Tx Diagnosis Neck pain, LBP   Progress Note/Certification   Onset of illness/injury or Date of Surgery 08/30/23   Therapy Frequency 1x wk x 4 wks, 2x month x 3 months   Predicted Duration 4 months   Progress Note Completed Date 02/01/24   GOALS   PT Goals 2   PT Goal 1   Goal Identifier standing   Goal Description able to stand 25 minutes   Rationale to maximize safety and independence with performance of ADLs and functional tasks;to maximize safety and independence within the home;to maximize safety and independence within the community;to maximize safety and independence with self cares   Goal Progress able to stand 15 minutes   Target Date 06/01/24   PT Goal 2   Goal Identifier walking   Goal Description able to walk 20 minutes   Rationale to maximize safety and independence within the home;to maximize safety and independence within the community;to maximize safety and independence with self cares;to maximize safety and independence with performance of ADLs and functional tasks   Target Date 06/01/24   Subjective Report   Subjective Report Feels more optimistic after each pain PT session.   Objective Measures   Objective Measures Objective Measure 1   Objective Measure 1   Objective Measure AROM: neck rotation 75%, flex 25%, ext 5%   Treatment Interventions (PT)   Interventions Therapeutic Procedure/Exercise;Neuromuscular Re-education   Therapeutic Procedure/Exercise   Therapeutic Procedures: strength, endurance, ROM, flexibillity minutes (67950) 30   Therapeutic Procedures Ther Proc 2;Ther Proc 3;Ther Proc 4;Ther Proc 5   Ther Proc 1 ok in standing -> cat/cow  modified for sitting - slump, overcorrect, neutral   Ther Proc 2 Neck stretch for levator scapulae x 5 R and L   Ther Proc 3 Snowden walk with postural correction, mindful walking cues russ thumbs fwd for neutral shldr, c spine   Ther Proc 4 Stretches for thoracic ext, KTC - modified as needed   Ther Proc 5 Lateral stepping to fatigue   Skilled Intervention cues for proper form   Patient Response/Progress good   Ther Proc 2 - Details Stretches for neck rotation and cervical retraction to 50-75%   Neuromuscular Re-education   Neuromuscular re-ed of mvmt, balance, coord, kinesthetic sense, posture, proprioception minutes (32305) 30   Neuromuscular Re-education Neuro Re-ed 2;Neuro Re-ed 3;Neuro Re-ed 4;Neuro Re-ed 5   Neuro Re-ed 1 ed, work on neutral spine, joint 'finds' in static, in prep for dynamic   Neuro Re-ed 2 PNE, Pain Knowledge, Q5-6, ed on Calming Sensitive Nerves - pt was ed re endogenous mechanisms and strategies to incr the brains production of chemicals which decr pain such as aerobic ex and improved pain knowledge. Ed on concepts of pacing, graded exposure, 'sore but safe', 'hurt does not equal harm'   Neuro Re-ed 3 Diaphragm breathing ed in sitting and supine   Neuro Re-ed 3 - Details ed on functional sequence of 'breathe, posture, move'   Neuro Re-ed 4 Self care wiht supine decompression   Skilled Intervention pain science, cues for proper form   Patient Response/Progress good   Neuro Re-ed 4 - Details initiated sleep hygiene ed as part of pain mgmt   Neuro Re-ed 5 Ed on standing alignment - tripod feet, soft knees, neutral spine, thumbs fwd, soft chin tuck position   Neuro Re-ed 5 - Details ed on using endurance vs power muscles for postural alignement -> use muscles as they are built   Plan   Updates to plan of care See PN, continue pain PT   Total Session Time   Timed Code Treatment Minutes 60   Total Treatment Time (sum of timed and untimed services) 60

## 2024-02-25 DIAGNOSIS — G43.909 MIGRAINE WITHOUT STATUS MIGRAINOSUS, NOT INTRACTABLE, UNSPECIFIED MIGRAINE TYPE: ICD-10-CM

## 2024-02-25 DIAGNOSIS — M54.10 RADICULAR PAIN OF UPPER EXTREMITY: ICD-10-CM

## 2024-02-25 DIAGNOSIS — M54.2 NECK PAIN: ICD-10-CM

## 2024-02-25 DIAGNOSIS — M79.18 MYOFASCIAL PAIN: ICD-10-CM

## 2024-02-26 NOTE — TELEPHONE ENCOUNTER
Received fax from pharmacy requesting refill(s) for   methocarbamol (ROBAXIN) 500 MG tablet,   Date last filled 01/28/2024    amitriptyline (ELAVIL) 75 MG tablet,    Date last filled 01/28/2024      tiZANidine (ZANAFLEX) 4 MG tablet   Date last filled 01/25/2024    Last Appt Date:01/24/2024    Next Appt scheduled: None    Pharmacy:        [facility] - [place], [place] - [address] [place] [address],    [facility] route for processing    MANPREET Ngo [facility] [place]

## 2024-02-26 NOTE — TELEPHONE ENCOUNTER
Name from pharmacy: Methocarbamol 500 MG Oral Tablet          Will file in chart as: methocarbamol (ROBAXIN) 500 MG tablet    Sig: TAKE 1 TO 2 TABLETS BY MOUTH THREE TIMES DAILY AS NEEDED FOR MUSCLE SPASM ,  ALLOW  4-6  HOURS  BETWEEN  ALL  MUSCLE  RELAXANTS  DOSES    Disp: 120 tablet    Refills: 0    Start: 2/25/2024    Class: E-Prescribe    Non-formulary For: Radicular pain of upper extremity; Neck pain; Migraine without status migrainosus, not intractable, unspecified migraine type; Myofascial pain    Last ordered: 3 months ago (11/21/2023) by ZEYAD Messer CNP    Last refill: 1/28/2024    Rx #: [id number]        Name from pharmacy: Amitriptyline HCl 75 MG Oral Tablet         Will file in chart as: amitriptyline (ELAVIL) 75 MG tablet    Sig: TAKE 1 TABLET BY MOUTH AT BEDTIME    Disp: 30 tablet    Refills: 0    Start: 2/25/2024    Class: E-Prescribe    Non-formulary For: Radicular pain of upper extremity; Neck pain; Migraine without status migrainosus, not intractable, unspecified migraine type    Last ordered: 3 months ago (11/21/2023) by ZEYAD Messer CNP    Last refill: 1/28/2024    Rx #: [id number]    Tricyclic Agents ( Annual appt and no PHQ9) Ghfurn6602/25/2024 01:57 PM   Protocol Details Blood Pressure under 140/90 in past 12 mos    Recent (12 mo) or future (30 days) visit within authorizing provider's specialty    Medication is active on med list    Patient is age 18 or older    Patient is not pregnant    No positive pregnancy test on record in past 12 mos       Name from pharmacy: tiZANidine HCl 4 MG Oral Tablet         Will file in chart as: tiZANidine (ZANAFLEX) 4 MG tablet    Sig: TAKE 2 TABLETS BY MOUTH EVERY DAY AT BEDTIME AS NEEDED FOR MUSCLE SPASM ,  ALLOW  4-6  HOURS  IN  BETWEEN  ALL  MUSCLE RELAXANT DOSES    Disp: 60 tablet    Refills: 0    Start: 2/25/2024    Class: E-Prescribe    Non-formulary For: Myofascial pain    Last ordered: 3 months ago (11/21/2023) by ZEYAD Messer CNP    Last  refill: 1/25/2024    Rx #: [id number]       To be filled at: [facility] - [place], [place] - [address]

## 2024-02-27 RX ORDER — METHOCARBAMOL 500 MG/1
TABLET, FILM COATED ORAL
Qty: 120 TABLET | Refills: 1 | Status: SHIPPED | OUTPATIENT
Start: 2024-02-27 | End: 2024-08-05

## 2024-02-27 RX ORDER — AMITRIPTYLINE HYDROCHLORIDE 75 MG/1
TABLET ORAL
Qty: 30 TABLET | Refills: 1 | Status: SHIPPED | OUTPATIENT
Start: 2024-02-27 | End: 2024-04-09

## 2024-02-27 NOTE — TELEPHONE ENCOUNTER
Chart reviewed - Request appears appropriate. Refilled for 30 day supply.     Bettie Mayers DNP, APRN, AGNP-C  [facility]

## 2024-03-30 ENCOUNTER — HEALTH MAINTENANCE LETTER (OUTPATIENT)
Age: 44
End: 2024-03-30

## 2024-04-07 DIAGNOSIS — M54.2 NECK PAIN: ICD-10-CM

## 2024-04-07 DIAGNOSIS — G43.909 MIGRAINE WITHOUT STATUS MIGRAINOSUS, NOT INTRACTABLE, UNSPECIFIED MIGRAINE TYPE: ICD-10-CM

## 2024-04-07 DIAGNOSIS — M54.10 RADICULAR PAIN OF UPPER EXTREMITY: ICD-10-CM

## 2024-04-08 RX ORDER — SUMATRIPTAN 50 MG/1
TABLET, FILM COATED ORAL
Qty: 9 TABLET | Refills: 0 | Status: SHIPPED | OUTPATIENT
Start: 2024-04-08 | End: 2024-06-10

## 2024-04-08 NOTE — TELEPHONE ENCOUNTER
Received fax request from pharmacy requesting refill(s) for amitriptyline (ELAVIL) 75 MG tablet    Last refilled on 2/29/2024 per pharmacy.     Pt last seen on 1/24/2024.  Next appt scheduled for NONE.     Will facilitate refill.

## 2024-04-08 NOTE — TELEPHONE ENCOUNTER
Name from pharmacy: Amitriptyline HCl 75 MG Oral Tablet         Will file in chart as: amitriptyline (ELAVIL) 75 MG tablet    Sig: TAKE 1 TABLET BY MOUTH AT BEDTIME    Disp: 30 tablet    Refills: 0    Start: 4/7/2024    Class: E-Prescribe    Non-formulary For: Radicular pain of upper extremity; Neck pain; Migraine without status migrainosus, not intractable, unspecified migraine type    Last ordered: 1 month ago (2/27/2024) by ZEYAD Messer CNP    Last refill: 2/29/2024    Rx #: [id number]    Tricyclic Agents ( Annual appt and no PHQ9) [id number]/07/2024 12:25 PM   Protocol Details Blood Pressure under 140/90 in past 12 mos    Recent (12 mo) or future (30 days) visit within authorizing provider's specialty    Medication is active on med list    Patient is age 18 or older    Patient is not pregnant    No positive pregnancy test on record in past 12 mos      To be filled at: [facility] - [place] - [address]

## 2024-04-09 RX ORDER — AMITRIPTYLINE HYDROCHLORIDE 75 MG/1
TABLET ORAL
Qty: 30 TABLET | Refills: 1 | Status: SHIPPED | OUTPATIENT
Start: 2024-04-09 | End: 2024-07-12

## 2024-04-09 NOTE — TELEPHONE ENCOUNTER
Chart reviewed - Request appears appropriate. Refilled for 30 day supply. At [facility], I recommended follow up 2-4 weeks after cervical MEHRAN, DOS 1/30/24. Please advise her to follow up with me in the next 8 weeks.     Bettie Mayers DNP, APRN, AGNP-C  [facility]

## 2024-06-08 DIAGNOSIS — G43.909 MIGRAINE WITHOUT STATUS MIGRAINOSUS, NOT INTRACTABLE, UNSPECIFIED MIGRAINE TYPE: ICD-10-CM

## 2024-06-08 DIAGNOSIS — M79.18 MYOFASCIAL PAIN: ICD-10-CM

## 2024-06-10 RX ORDER — SUMATRIPTAN 50 MG/1
TABLET, FILM COATED ORAL
Qty: 9 TABLET | Refills: 0 | Status: SHIPPED | OUTPATIENT
Start: 2024-06-10

## 2024-06-10 NOTE — TELEPHONE ENCOUNTER
Received fax from pharmacy requesting refill(s) for     tiZANidine (ZANAFLEX) 4 MG tablet     Last refilled on 4/7/24    Pt last seen on 1/24/24  Next appt scheduled for none    E-prescribe to:    [facility] - [place], [place] - [address]     Will facilitate refill.

## 2024-06-10 NOTE — TELEPHONE ENCOUNTER
Name from pharmacy: tiZANidine HCl 4 MG Oral Tablet          Will file in chart as: tiZANidine (ZANAFLEX) 4 MG tablet    Sig: TAKE 2 TABLETS BY MOUTH AT BEDTIME AS NEEDED FOR MUSCLE SPASM. ALLOW 4 TO 6 HOURS IN BETWEEN ALL MUSCLE RELAXANTS    Disp: 60 tablet    Refills: 0    Start: 6/8/2024    Class: E-Prescribe    Non-formulary For: Myofascial pain    Last ordered: 3 months ago (2/27/2024) by ZEYAD Messer CNP    Last refill: 4/7/2024    Rx #: [id number]       To be filled at: [facility] - [place], [place] - [address]

## 2024-06-11 NOTE — TELEPHONE ENCOUNTER
Chart reviewed - Request appears appropriate. Refilled for 30 day supply.     Bettie Mayers DNP, APRN, AGNP-C  [facility]      caffeine

## 2024-07-12 DIAGNOSIS — M54.2 NECK PAIN: ICD-10-CM

## 2024-07-12 DIAGNOSIS — M54.10 RADICULAR PAIN OF UPPER EXTREMITY: ICD-10-CM

## 2024-07-12 DIAGNOSIS — G43.909 MIGRAINE WITHOUT STATUS MIGRAINOSUS, NOT INTRACTABLE, UNSPECIFIED MIGRAINE TYPE: ICD-10-CM

## 2024-07-12 RX ORDER — AMITRIPTYLINE HYDROCHLORIDE 75 MG/1
TABLET ORAL
Qty: 30 TABLET | Refills: 0 | Status: SHIPPED | OUTPATIENT
Start: 2024-07-12 | End: 2024-08-05

## 2024-07-12 NOTE — TELEPHONE ENCOUNTER
Received fax from pharmacy requesting refill(s) for     amitriptyline (ELAVIL) 75 MG tablet    Date last filled 01/28/2024    Last Appt Date:01/24/2024    Next Appt scheduled: None    Pharmacy:   [facility] [place] [place] [place] - [address],    [employer] route for processing    Skyla Bunn MA  [facility] [place]

## 2024-07-12 NOTE — TELEPHONE ENCOUNTER
Spoke to patient regarding scheduling for a follow up appt.  She is not able to make a follow up appt due to she does not have insurance at this time and not sure if and when she is able to get insurance.

## 2024-07-12 NOTE — TELEPHONE ENCOUNTER
Name from pharmacy: Amitriptyline HCl 75 MG Oral Tablet         Will file in chart as: amitriptyline (ELAVIL) 75 MG tablet    Sig: TAKE 1 TABLET BY MOUTH AT BEDTIME    Disp: 30 tablet    Refills: 0    Start: 7/12/2024    Class: E-Prescribe    Non-formulary For: Radicular pain of upper extremity; Neck pain; Migraine without status migrainosus, not intractable, unspecified migraine type    Last ordered: 3 months ago (4/9/2024) by ZEYAD Messer CNP    Last refill: 6/8/2024    Rx #: [id number]    Tricyclic Agents ( Annual appt and no PHQ9) Ocipeh6307/12/2024 07:21 AM   Protocol Details PHQ-9 is only required if the requested medication is prescribed for depression or bipolar depression    Blood Pressure under 140/90 in past 12 mos    Medication is active on med list    Recent (12 mo) or future (90 days) visit within authorizing provider's specialty    Medicaiton indicated for associated diagnosis    Patient is age 18 or older    Patient is not pregnant    No positive pregnancy test on record in past 12 mos      To be filled at: [facility] - [place], [place] - [address]

## 2024-07-12 NOTE — TELEPHONE ENCOUNTER
Chart reviewed - Request appears appropriate. Refilled for 30 day supply.     She is due for follow up. Please advise her to schedule before next refill is needed.     Bettie Mayers DNP, APRN, AGNP-C  [facility]

## 2024-09-18 DIAGNOSIS — M79.18 MYOFASCIAL PAIN: ICD-10-CM

## 2024-09-18 NOTE — TELEPHONE ENCOUNTER
Drug: tiZANidine (ZANAFLEX) 4 MG tablet   Qty: 60 tablet   Last filled 6/11/24  Last seen: 1/24/24  Next appointment None

## 2024-09-18 NOTE — TELEPHONE ENCOUNTER
Name from pharmacy: tiZANidine HCl 4 MG Oral Tablet          Will file in chart as: tiZANidine (ZANAFLEX) 4 MG tablet    Sig: TAKE 1 TO 2 TABLETS BY MOUTH IN THE EVENING AS NEEDED FOR MUSCLE SPASM    Disp: 60 tablet    Refills: 0    Start: 9/18/2024    Class: E-Prescribe    Non-formulary For: Myofascial pain    Last ordered: 3 months ago (6/11/2024) by ZEYAD Messer CNP    Last refill: 8/4/2024    Rx #: [id number]       To be filled at: [facility] - [place], [place] - [address] [place] [address]

## 2024-09-26 NOTE — TELEPHONE ENCOUNTER
Chart reviewed - Request appears appropriate. Refilled for total 60 day supply.     No additional refills outside of clinic appointment. PCP agreeable to manage long term but follow up required before they will start prescribing.     Nursing team - please update patient that these are last two refills that will be authorized outside of clinic appointment with me/PCP    Bettie Mayers DNP, APRN, AGNP-C  [facility]

## 2024-10-25 ENCOUNTER — TELEPHONE (OUTPATIENT)
Dept: PALLIATIVE MEDICINE | Facility: CLINIC | Age: 44
End: 2024-10-25

## 2024-10-25 DIAGNOSIS — M54.10 RADICULAR PAIN OF UPPER EXTREMITY: ICD-10-CM

## 2024-10-25 DIAGNOSIS — M54.2 NECK PAIN: ICD-10-CM

## 2024-10-25 DIAGNOSIS — G43.909 MIGRAINE WITHOUT STATUS MIGRAINOSUS, NOT INTRACTABLE, UNSPECIFIED MIGRAINE TYPE: ICD-10-CM

## 2024-10-25 DIAGNOSIS — F33.0 MILD RECURRENT MAJOR DEPRESSION (H): ICD-10-CM

## 2024-10-25 DIAGNOSIS — F41.9 ANXIETY DISORDER, UNSPECIFIED TYPE: ICD-10-CM

## 2024-10-25 NOTE — TELEPHONE ENCOUNTER
Received call from patient  requesting refill(s) for     Amitriptyline HCl 75 MG Oral Tablet (ELAVIL)           Last refilled on: Jan. 28, 2024     Patient last seen on : Jan. 24, 2024     Next appt scheduled for : None on file    E-prescribe to:  [facility] - [place], [place] - [address]     Will facilitate refill.      Dixie Lane, Clinic Facilitator  [facility] [place]

## 2024-10-25 NOTE — LETTER
October 28, 2024    Jena Pinedo  [address] [place] [address]    Dear Jena,       We recently received a refill request for FLUoxetine (PROZAC) 20 MG capsule.  We have refilled this for a one time  supply only because you are due for a:    Office visit med check within the next 3 months       Please call at your earliest convenience so that there will not be a delay with your future refills.          Thank you,   Your [facility] Team/AT  [phone]

## 2024-10-26 NOTE — TELEPHONE ENCOUNTER
Nursing team - please contact patient for update on insurance status. I am hesitant to continue prescribing without confirmation she will be able to be seen right away within the first 1-2 weeks in January.     Bettie Mayers DNP, APRN, AGNP-C  [facility]

## 2024-10-28 NOTE — TELEPHONE ENCOUNTER
Please send a reminder to schedule a medication check in the clinic within the next 3 months.   Kristen Kehr PA-C

## 2024-10-28 NOTE — TELEPHONE ENCOUNTER
Note that this patient has been transferred back to PCP pending follow-up with them for medication management and that patient was notified that appointment was needed for further refills from this clinic also on 9/26/2024, however patient does not appear to have returned call to clinic.     LVM for patient requesting return call to clinic.     Juliet Ling RN

## 2024-11-04 DIAGNOSIS — G43.909 MIGRAINE WITHOUT STATUS MIGRAINOSUS, NOT INTRACTABLE, UNSPECIFIED MIGRAINE TYPE: ICD-10-CM

## 2024-11-04 DIAGNOSIS — M54.10 RADICULAR PAIN OF UPPER EXTREMITY: ICD-10-CM

## 2024-11-04 DIAGNOSIS — M54.2 NECK PAIN: ICD-10-CM

## 2024-11-04 RX ORDER — AMITRIPTYLINE HYDROCHLORIDE 75 MG/1
TABLET ORAL
Qty: 30 TABLET | Refills: 0 | OUTPATIENT
Start: 2024-11-04

## 2024-11-04 RX ORDER — AMITRIPTYLINE HYDROCHLORIDE 75 MG/1
75 TABLET ORAL AT BEDTIME
Qty: 30 TABLET | Refills: 0 | OUTPATIENT
Start: 2024-11-04

## 2024-11-04 NOTE — TELEPHONE ENCOUNTER
Patient has not been seen in primary care for management of this medication.   Her last appointment in the clinic was 9/11/2023 for depression / anxiety.     This prescription has been prescribed by Pain Management   There has been discussion of transitioning back to primary care for long term management but no appointment has been made.     There have been multiple messages sent to schedule if this prescription is going to be transferred to primary care.     If refill is going to be given, she will need to contact her prescribing provider or schedule an appointment in order to continue with this care through primary care.     Kristen Kehr PA-C

## 2024-11-04 NOTE — TELEPHONE ENCOUNTER
She will need an appointment in order to discuss transitioning from Pain Management to Primary Care for prescriptions.   Multiple messages have been given to make the appointment, no appointment has been made  Kristen Kehr PA-C

## 2024-11-06 ENCOUNTER — MYC REFILL (OUTPATIENT)
Dept: FAMILY MEDICINE | Facility: CLINIC | Age: 44
End: 2024-11-06

## 2024-11-06 ENCOUNTER — MYC REFILL (OUTPATIENT)
Dept: PALLIATIVE MEDICINE | Facility: CLINIC | Age: 44
End: 2024-11-06

## 2024-11-06 DIAGNOSIS — G43.909 MIGRAINE WITHOUT STATUS MIGRAINOSUS, NOT INTRACTABLE, UNSPECIFIED MIGRAINE TYPE: ICD-10-CM

## 2024-11-06 DIAGNOSIS — M54.2 NECK PAIN: ICD-10-CM

## 2024-11-06 DIAGNOSIS — M54.10 RADICULAR PAIN OF UPPER EXTREMITY: ICD-10-CM

## 2024-11-06 NOTE — TELEPHONE ENCOUNTER
Refills have been requested for the following medications:         amitriptyline (ELAVIL) 75 MG tablet [Bettie Mayers]     Preferred pharmacy: [facility] - [place], [place] - [address]

## 2024-11-06 NOTE — LETTER
November 7, 2024    Jena Pinedo  [address] [place] [address]    Dear Jena,       We recently received a refill request for Prescription SUMAtriptan (IMITREX) 50 MG tablet.  We have refilled this for a one time supply only because you are due for a:    Office visit for med check in the next 3 months       Please call at your earliest convenience so that there will not be a delay with your future refills.          Thank you,   Your [facility] Team/AT  [phone]

## 2024-11-06 NOTE — TELEPHONE ENCOUNTER
Fax received from:   [facility] - [place], [place]   Refill authorization requested    Drug: amitriptyline (ELAVIL) 75 MG tablet   Qty: 30 tablet   Last filled 8/5/24  Last seen: 1/30/24  Next appointment none

## 2024-11-07 RX ORDER — SUMATRIPTAN 50 MG/1
TABLET, FILM COATED ORAL
Qty: 9 TABLET | Refills: 0 | Status: SHIPPED | OUTPATIENT
Start: 2024-11-07

## 2024-11-07 NOTE — TELEPHONE ENCOUNTER
Please send a reminder to schedule a medication check within the next 3 months.   Kristen Kehr PA-C

## 2024-11-10 ENCOUNTER — MYC REFILL (OUTPATIENT)
Dept: PALLIATIVE MEDICINE | Facility: CLINIC | Age: 44
End: 2024-11-10

## 2024-11-10 DIAGNOSIS — M54.10 RADICULAR PAIN OF UPPER EXTREMITY: ICD-10-CM

## 2024-11-10 DIAGNOSIS — M54.2 NECK PAIN: ICD-10-CM

## 2024-11-10 DIAGNOSIS — G43.909 MIGRAINE WITHOUT STATUS MIGRAINOSUS, NOT INTRACTABLE, UNSPECIFIED MIGRAINE TYPE: ICD-10-CM

## 2024-11-10 RX ORDER — AMITRIPTYLINE HYDROCHLORIDE 75 MG/1
75 TABLET ORAL AT BEDTIME
Qty: 30 TABLET | Refills: 1 | Status: CANCELLED | OUTPATIENT
Start: 2024-11-10

## 2024-11-11 NOTE — TELEPHONE ENCOUNTER
She has not been seen since January 2024, and I cannot continue prescribing medications without routine follow up. Please advise her to taper off. She may follow up with me when able to discuss resuming medications.     Bettie Mayers DNP, APRN, AGNP-C  [facility]

## 2024-11-11 NOTE — TELEPHONE ENCOUNTER
Patient was advised to transfer back to PCP in 9/26 encounter and that she would need to be seen by PCP to discuss medication management and primary has made multiple attempts to contact patient also.     LVM for patient requesting return call to clinic.     Juliet Ling RN

## 2025-01-26 ENCOUNTER — HEALTH MAINTENANCE LETTER (OUTPATIENT)
Age: 45
End: 2025-01-26

## 2025-02-26 ENCOUNTER — TELEPHONE (OUTPATIENT)
Dept: FAMILY MEDICINE | Facility: CLINIC | Age: 45
End: 2025-02-26

## 2025-02-26 ENCOUNTER — OFFICE VISIT (OUTPATIENT)
Dept: FAMILY MEDICINE | Facility: CLINIC | Age: 45
End: 2025-02-26
Payer: COMMERCIAL

## 2025-02-26 VITALS
HEIGHT: 68 IN | HEART RATE: 87 BPM | OXYGEN SATURATION: 100 % | DIASTOLIC BLOOD PRESSURE: 82 MMHG | RESPIRATION RATE: 16 BRPM | WEIGHT: 196 LBS | BODY MASS INDEX: 29.7 KG/M2 | TEMPERATURE: 97.8 F | SYSTOLIC BLOOD PRESSURE: 118 MMHG

## 2025-02-26 DIAGNOSIS — G43.909 MIGRAINE WITHOUT STATUS MIGRAINOSUS, NOT INTRACTABLE, UNSPECIFIED MIGRAINE TYPE: ICD-10-CM

## 2025-02-26 DIAGNOSIS — M54.2 NECK PAIN: ICD-10-CM

## 2025-02-26 DIAGNOSIS — G43.909 MIGRAINE WITHOUT STATUS MIGRAINOSUS, NOT INTRACTABLE, UNSPECIFIED MIGRAINE TYPE: Primary | ICD-10-CM

## 2025-02-26 DIAGNOSIS — M54.10 RADICULAR PAIN OF UPPER EXTREMITY: ICD-10-CM

## 2025-02-26 DIAGNOSIS — Z12.31 VISIT FOR SCREENING MAMMOGRAM: ICD-10-CM

## 2025-02-26 DIAGNOSIS — Z82.49 FAMILY HISTORY OF HYPERTROPHIC CARDIOMYOPATHY: ICD-10-CM

## 2025-02-26 PROCEDURE — 90656 IIV3 VACC NO PRSV 0.5 ML IM: CPT | Performed by: PHYSICIAN ASSISTANT

## 2025-02-26 PROCEDURE — G2211 COMPLEX E/M VISIT ADD ON: HCPCS | Performed by: PHYSICIAN ASSISTANT

## 2025-02-26 PROCEDURE — 90471 IMMUNIZATION ADMIN: CPT | Performed by: PHYSICIAN ASSISTANT

## 2025-02-26 PROCEDURE — 91320 SARSCV2 VAC 30MCG TRS-SUC IM: CPT | Performed by: PHYSICIAN ASSISTANT

## 2025-02-26 PROCEDURE — 99214 OFFICE O/P EST MOD 30 MIN: CPT | Mod: 25 | Performed by: PHYSICIAN ASSISTANT

## 2025-02-26 PROCEDURE — 90480 ADMN SARSCOV2 VAC 1/ONLY CMP: CPT | Performed by: PHYSICIAN ASSISTANT

## 2025-02-26 RX ORDER — AMITRIPTYLINE HYDROCHLORIDE 50 MG/1
TABLET ORAL
Qty: 90 TABLET | Refills: 3 | Status: SHIPPED | OUTPATIENT
Start: 2025-02-26

## 2025-02-26 RX ORDER — SUMATRIPTAN SUCCINATE 100 MG/1
TABLET ORAL
Qty: 18 TABLET | Refills: 11 | Status: SHIPPED | OUTPATIENT
Start: 2025-02-26

## 2025-02-26 ASSESSMENT — ANXIETY QUESTIONNAIRES
GAD7 TOTAL SCORE: 18
1. FEELING NERVOUS, ANXIOUS, OR ON EDGE: MORE THAN HALF THE DAYS
8. IF YOU CHECKED OFF ANY PROBLEMS, HOW DIFFICULT HAVE THESE MADE IT FOR YOU TO DO YOUR WORK, TAKE CARE OF THINGS AT HOME, OR GET ALONG WITH OTHER PEOPLE?: VERY DIFFICULT
4. TROUBLE RELAXING: NEARLY EVERY DAY
GAD7 TOTAL SCORE: 18
GAD7 TOTAL SCORE: 18
7. FEELING AFRAID AS IF SOMETHING AWFUL MIGHT HAPPEN: NEARLY EVERY DAY
5. BEING SO RESTLESS THAT IT IS HARD TO SIT STILL: NEARLY EVERY DAY
IF YOU CHECKED OFF ANY PROBLEMS ON THIS QUESTIONNAIRE, HOW DIFFICULT HAVE THESE PROBLEMS MADE IT FOR YOU TO DO YOUR WORK, TAKE CARE OF THINGS AT HOME, OR GET ALONG WITH OTHER PEOPLE: VERY DIFFICULT
6. BECOMING EASILY ANNOYED OR IRRITABLE: NEARLY EVERY DAY
3. WORRYING TOO MUCH ABOUT DIFFERENT THINGS: MORE THAN HALF THE DAYS
2. NOT BEING ABLE TO STOP OR CONTROL WORRYING: MORE THAN HALF THE DAYS
7. FEELING AFRAID AS IF SOMETHING AWFUL MIGHT HAPPEN: NEARLY EVERY DAY

## 2025-02-26 ASSESSMENT — PAIN SCALES - GENERAL: PAINLEVEL_OUTOF10: MODERATE PAIN (5)

## 2025-02-26 ASSESSMENT — PATIENT HEALTH QUESTIONNAIRE - PHQ9
10. IF YOU CHECKED OFF ANY PROBLEMS, HOW DIFFICULT HAVE THESE PROBLEMS MADE IT FOR YOU TO DO YOUR WORK, TAKE CARE OF THINGS AT HOME, OR GET ALONG WITH OTHER PEOPLE: SOMEWHAT DIFFICULT
SUM OF ALL RESPONSES TO PHQ QUESTIONS 1-9: 13
SUM OF ALL RESPONSES TO PHQ QUESTIONS 1-9: 13

## 2025-02-26 NOTE — TELEPHONE ENCOUNTER
[facility] pharmacy calling in regarding 25mg amitriptyline. Sig does not match with what was ordered. Pended 50mg amitriptyline, if that is what provider would like.    Routing to provider - [facility] calling about amitriptyline 25mg that was sent. Pended 50mg is that is what provider would like. 50mg does not require a prior auth like the 25mg does. Please advise.     Thank you - Clover Lynn, LAURENTN, RN

## 2025-02-26 NOTE — PROGRESS NOTES
"  Assessment & Plan     Migraine without status migrainosus, not intractable, unspecified migraine type  She will get back on her usual dose of the amitriptyline at 50 mg. Start with the 25 mg dose x 2 weeks, then increase to the 50 mg.   - amitriptyline (ELAVIL) 25 MG tablet; 25 mg (1/2 tablet) at bedtime x 2 weeks, then increase to 50 mg (1 tablet) at bedtime  - SUMAtriptan (IMITREX) 100 MG tablet; TAKE 1 TABLET BY MOUTH AT ONSET OF HEADACHE FOR MIGRAINE. MAY REPEAT DOSE IN 2 HOURS IF NEEDED. MAXIMUM OF 2 TABLETS IN 24 HOURS    Neck pain  See above. Start the amitriptyline again.   - amitriptyline (ELAVIL) 25 MG tablet; 25 mg (1/2 tablet) at bedtime x 2 weeks, then increase to 50 mg (1 tablet) at bedtime    Family history of hypertrophic cardiomyopathy  Echocardiogram ordered.   - Echocardiogram Complete; Future    Visit for screening mammogram  - MA Screening Bilateral w/ [place]; Future    The longitudinal plan of care for the diagnosis(es)/condition(s) as documented were addressed during this visit. Due to the added complexity in care, I will continue to support Jena in the subsequent management and with ongoing continuity of care.        BMI  Estimated body mass index is 30.02 kg/m  as calculated from the following:    Height as of this encounter: 1.721 m (5' 7.75\").    Weight as of this encounter: 88.9 kg (196 lb).             Heber Bella is a 44 year old, presenting for the following health issues:  Recheck Medication      2/26/2025     7:52 AM   Additional Questions   Roomed by Rivera BRUCE MA     History of Present Illness       Back Pain:  She presents for follow up of back pain. Patient's back pain is a chronic problem.  Location of back pain:  Right lower back, left lower back, right middle of back, left middle of back, right side of neck, left side of neck, right shoulder and left shoulder  Description of back pain: burning, cramping, sharp, shooting and stabbing  Back pain spreads: " "nowhere    Since patient first noticed back pain, pain is: always present, but gets better and worse  Does back pain interfere with her job:  Yes       Mental Health Follow-up:  Patient presents to follow-up on Anxiety.    Patient's anxiety since last visit has been:  Medium  The patient is having other symptoms associated with anxiety.  Any significant life events: financial concerns and grief or loss  Patient is feeling anxious or having panic attacks.  Patient has no concerns about alcohol or drug use.    Headaches:   Since the patient's last clinic visit, headaches are: worsened  The patient is getting headaches:  Weekly  She is not able to do normal daily activities when she has a migraine.  The patient is taking the following rescue/relief medications:  Ibuprofen (Advil, Motrin), Tylenol and sumatriptan (Imitrex)   Patient states \"The relief is inconsistent\" from the rescue/relief medications.   The patient is taking the following medications to prevent migraines:  No medications to prevent migraines  In the past 4 weeks, the patient has gone to an Urgent Care or Emergency Room 0 times times due to headaches.    She eats 2-3 servings of fruits and vegetables daily.She consumes 6 sweetened beverage(s) daily.She exercises with enough effort to increase her heart rate 20 to 29 minutes per day.  She exercises with enough effort to increase her heart rate 5 days per week.   She is taking medications regularly.       Jena is here today for recheck of the following health conditions:    Migraine: managed with imitrex for acute symptoms, otherwise was taking amitriptyline for prevention and neck pain. She was previously established with Pain Management for the neck and low back pain and the amitriptyline works well. She is transitioning back to primary care. She was without insurance for about a year and has been off of her medications now for months.  She was on medication for mood stabilization previously also. " "Since going off of her medications, she feels that she has been able to manage without medication and does not wish to get back on the selective serotonin reuptake inhibitor previously prescribed. She is working at a new job, stress has been better managed and the anxiety / depression symptoms are controlled.   She also has been instructed to have cardiac testing based on a new diagnosis in her mother. She has hypertrophic cardiomyopathy. Jena has never had cardiac problems diagnosed in the past.                   Review of Systems  Constitutional, HEENT, cardiovascular, pulmonary, GI, , musculoskeletal, neuro, skin, endocrine and psych systems are negative, except as otherwise noted.      Objective    /82   Pulse 87   Temp 97.8  F (36.6  C) (Tympanic)   Resp 16   Ht 1.721 m (5' 7.75\")   Wt 88.9 kg (196 lb)   LMP  (LMP Unknown)   SpO2 100%   Breastfeeding No   BMI 30.02 kg/m    Body mass index is 30.02 kg/m .  Physical Exam   GENERAL: alert and no distress  EYES: Eyes grossly normal to inspection, PERRL and conjunctivae and sclerae normal  NECK: no adenopathy, no asymmetry, masses, or scars  RESP: lungs clear to auscultation - no rales, rhonchi or wheezes  CV: regular rate and rhythm, normal S1 S2, no S3 or S4, no murmur, click or rub, no peripheral edema  MS: no gross musculoskeletal defects noted, no edema  SKIN: no suspicious lesions or rashes  NEURO: Normal strength and tone, mentation intact and speech normal  PSYCH: mentation appears normal, affect normal/bright            Signed Electronically by: Kristen M. Kehr, PA-C    "